# Patient Record
Sex: FEMALE | Race: WHITE | NOT HISPANIC OR LATINO | Employment: UNEMPLOYED | URBAN - METROPOLITAN AREA
[De-identification: names, ages, dates, MRNs, and addresses within clinical notes are randomized per-mention and may not be internally consistent; named-entity substitution may affect disease eponyms.]

---

## 2020-12-15 ENCOUNTER — NURSE TRIAGE (OUTPATIENT)
Dept: ADMINISTRATIVE | Facility: CLINIC | Age: 50
End: 2020-12-15

## 2020-12-15 NOTE — TELEPHONE ENCOUNTER
Pt calling for CV results. RN advised  The results were still in process but she should hear from the clinic she got the testing done at once the results were in. Advised pt she can always call back and check on the status. She VU.     Reason for Disposition   Caller requesting lab results    Protocols used:  PCP CALL - NO TRIAGE-A-AH

## 2021-04-14 ENCOUNTER — OFFICE VISIT (OUTPATIENT)
Dept: PODIATRY | Facility: CLINIC | Age: 51
End: 2021-04-14
Payer: MEDICAID

## 2021-04-14 VITALS
HEIGHT: 65 IN | SYSTOLIC BLOOD PRESSURE: 154 MMHG | WEIGHT: 213.81 LBS | BODY MASS INDEX: 35.62 KG/M2 | DIASTOLIC BLOOD PRESSURE: 99 MMHG

## 2021-04-14 DIAGNOSIS — G57.62 MORTON'S NEUROMA OF THIRD INTERSPACE OF LEFT FOOT: ICD-10-CM

## 2021-04-14 DIAGNOSIS — M72.2 PLANTAR FASCIITIS: Primary | ICD-10-CM

## 2021-04-14 PROCEDURE — 99213 OFFICE O/P EST LOW 20 MIN: CPT | Mod: PBBFAC,PN | Performed by: PODIATRIST

## 2021-04-14 PROCEDURE — 99203 OFFICE O/P NEW LOW 30 MIN: CPT | Mod: S$PBB,,, | Performed by: PODIATRIST

## 2021-04-14 PROCEDURE — 99999 PR PBB SHADOW E&M-EST. PATIENT-LVL III: ICD-10-PCS | Mod: PBBFAC,,, | Performed by: PODIATRIST

## 2021-04-14 PROCEDURE — 99203 PR OFFICE/OUTPT VISIT, NEW, LEVL III, 30-44 MIN: ICD-10-PCS | Mod: S$PBB,,, | Performed by: PODIATRIST

## 2021-04-14 PROCEDURE — 99999 PR PBB SHADOW E&M-EST. PATIENT-LVL III: CPT | Mod: PBBFAC,,, | Performed by: PODIATRIST

## 2021-04-14 RX ORDER — DICLOFENAC SODIUM 75 MG/1
75 TABLET, DELAYED RELEASE ORAL 2 TIMES DAILY
Qty: 60 TABLET | Refills: 1 | Status: SHIPPED | OUTPATIENT
Start: 2021-04-14 | End: 2021-08-12 | Stop reason: SDUPTHER

## 2021-04-21 ENCOUNTER — NURSE TRIAGE (OUTPATIENT)
Dept: ADMINISTRATIVE | Facility: CLINIC | Age: 51
End: 2021-04-21

## 2021-04-22 ENCOUNTER — TELEPHONE (OUTPATIENT)
Dept: PRIMARY CARE CLINIC | Facility: CLINIC | Age: 51
End: 2021-04-22

## 2021-05-05 ENCOUNTER — OFFICE VISIT (OUTPATIENT)
Dept: PODIATRY | Facility: CLINIC | Age: 51
End: 2021-05-05
Payer: MEDICAID

## 2021-05-05 VITALS
HEART RATE: 105 BPM | BODY MASS INDEX: 35.2 KG/M2 | WEIGHT: 219 LBS | DIASTOLIC BLOOD PRESSURE: 84 MMHG | HEIGHT: 66 IN | SYSTOLIC BLOOD PRESSURE: 128 MMHG

## 2021-05-05 DIAGNOSIS — R20.0 NUMBNESS: ICD-10-CM

## 2021-05-05 DIAGNOSIS — M72.2 PLANTAR FASCIITIS: ICD-10-CM

## 2021-05-05 DIAGNOSIS — G60.9 IDIOPATHIC PERIPHERAL NEUROPATHY: ICD-10-CM

## 2021-05-05 DIAGNOSIS — R26.89 BALANCE PROBLEM: Primary | ICD-10-CM

## 2021-05-05 PROCEDURE — 99999 PR PBB SHADOW E&M-EST. PATIENT-LVL III: CPT | Mod: PBBFAC,,, | Performed by: PODIATRIST

## 2021-05-05 PROCEDURE — 99213 OFFICE O/P EST LOW 20 MIN: CPT | Mod: S$PBB,,, | Performed by: PODIATRIST

## 2021-05-05 PROCEDURE — 99213 OFFICE O/P EST LOW 20 MIN: CPT | Mod: PBBFAC,PN | Performed by: PODIATRIST

## 2021-05-05 PROCEDURE — 99213 PR OFFICE/OUTPT VISIT, EST, LEVL III, 20-29 MIN: ICD-10-PCS | Mod: S$PBB,,, | Performed by: PODIATRIST

## 2021-05-05 PROCEDURE — 99999 PR PBB SHADOW E&M-EST. PATIENT-LVL III: ICD-10-PCS | Mod: PBBFAC,,, | Performed by: PODIATRIST

## 2021-05-05 RX ORDER — GABAPENTIN 400 MG/1
400 CAPSULE ORAL 4 TIMES DAILY
Qty: 120 CAPSULE | Refills: 1 | Status: SHIPPED | OUTPATIENT
Start: 2021-05-05 | End: 2021-05-11

## 2021-05-05 RX ORDER — GABAPENTIN 400 MG/1
400 CAPSULE ORAL 4 TIMES DAILY
Qty: 120 CAPSULE | Refills: 1 | Status: SHIPPED | OUTPATIENT
Start: 2021-05-05 | End: 2021-05-05 | Stop reason: SDUPTHER

## 2021-05-11 ENCOUNTER — OFFICE VISIT (OUTPATIENT)
Dept: PRIMARY CARE CLINIC | Facility: CLINIC | Age: 51
End: 2021-05-11
Payer: MEDICAID

## 2021-05-11 VITALS
BODY MASS INDEX: 35.2 KG/M2 | HEIGHT: 66 IN | RESPIRATION RATE: 18 BRPM | SYSTOLIC BLOOD PRESSURE: 132 MMHG | HEART RATE: 97 BPM | WEIGHT: 219 LBS | TEMPERATURE: 98 F | OXYGEN SATURATION: 99 % | DIASTOLIC BLOOD PRESSURE: 86 MMHG

## 2021-05-11 DIAGNOSIS — R11.2 NAUSEA AND VOMITING, INTRACTABILITY OF VOMITING NOT SPECIFIED, UNSPECIFIED VOMITING TYPE: ICD-10-CM

## 2021-05-11 DIAGNOSIS — Z11.59 NEED FOR HEPATITIS C SCREENING TEST: ICD-10-CM

## 2021-05-11 DIAGNOSIS — Z11.4 ENCOUNTER FOR SCREENING FOR HIV: ICD-10-CM

## 2021-05-11 DIAGNOSIS — G57.62 MORTON'S NEUROMA OF THIRD INTERSPACE OF LEFT FOOT: ICD-10-CM

## 2021-05-11 DIAGNOSIS — E66.9 CLASS 2 OBESITY WITH BODY MASS INDEX (BMI) OF 35.0 TO 35.9 IN ADULT, UNSPECIFIED OBESITY TYPE, UNSPECIFIED WHETHER SERIOUS COMORBIDITY PRESENT: ICD-10-CM

## 2021-05-11 DIAGNOSIS — Z01.419 ROUTINE GYNECOLOGICAL EXAMINATION: ICD-10-CM

## 2021-05-11 DIAGNOSIS — Z12.31 ENCOUNTER FOR SCREENING MAMMOGRAM FOR MALIGNANT NEOPLASM OF BREAST: Primary | ICD-10-CM

## 2021-05-11 DIAGNOSIS — Z12.11 ENCOUNTER FOR SCREENING FOR MALIGNANT NEOPLASM OF COLON: ICD-10-CM

## 2021-05-11 DIAGNOSIS — M72.2 PLANTAR FASCIITIS: ICD-10-CM

## 2021-05-11 DIAGNOSIS — Z72.0 TOBACCO ABUSE: ICD-10-CM

## 2021-05-11 DIAGNOSIS — G62.9 NEUROPATHY: ICD-10-CM

## 2021-05-11 PROBLEM — E66.812 CLASS 2 OBESITY WITH BODY MASS INDEX (BMI) OF 35.0 TO 35.9 IN ADULT: Status: ACTIVE | Noted: 2021-05-11

## 2021-05-11 LAB
BILIRUB SERPL-MCNC: NEGATIVE MG/DL
BLOOD URINE, POC: NORMAL
CLARITY, POC UA: NORMAL
COLOR, POC UA: YELLOW
GLUCOSE UR QL STRIP: NEGATIVE
KETONES UR QL STRIP: NEGATIVE
LEUKOCYTE ESTERASE URINE, POC: NORMAL
NITRITE, POC UA: NEGATIVE
PH, POC UA: 6
PROTEIN, POC: NORMAL
SPECIFIC GRAVITY, POC UA: 1.01
UROBILINOGEN, POC UA: 1

## 2021-05-11 PROCEDURE — 81002 URINALYSIS NONAUTO W/O SCOPE: CPT | Mod: PBBFAC,PN | Performed by: FAMILY MEDICINE

## 2021-05-11 PROCEDURE — 99214 OFFICE O/P EST MOD 30 MIN: CPT | Mod: PBBFAC,PN | Performed by: FAMILY MEDICINE

## 2021-05-11 PROCEDURE — 99999 PR PBB SHADOW E&M-EST. PATIENT-LVL IV: ICD-10-PCS | Mod: PBBFAC,,, | Performed by: FAMILY MEDICINE

## 2021-05-11 PROCEDURE — 99214 PR OFFICE/OUTPT VISIT, EST, LEVL IV, 30-39 MIN: ICD-10-PCS | Mod: S$PBB,,, | Performed by: FAMILY MEDICINE

## 2021-05-11 PROCEDURE — 99999 PR PBB SHADOW E&M-EST. PATIENT-LVL IV: CPT | Mod: PBBFAC,,, | Performed by: FAMILY MEDICINE

## 2021-05-11 PROCEDURE — 99214 OFFICE O/P EST MOD 30 MIN: CPT | Mod: S$PBB,,, | Performed by: FAMILY MEDICINE

## 2021-05-11 RX ORDER — CYCLOBENZAPRINE HCL 10 MG
10 TABLET ORAL 3 TIMES DAILY PRN
Qty: 30 TABLET | Refills: 5 | Status: SHIPPED | OUTPATIENT
Start: 2021-05-11 | End: 2021-08-19

## 2021-05-11 RX ORDER — TRAMADOL HYDROCHLORIDE 50 MG/1
50 TABLET ORAL EVERY 6 HOURS PRN
Qty: 30 TABLET | Refills: 0 | Status: SHIPPED | OUTPATIENT
Start: 2021-05-11 | End: 2021-05-21

## 2021-05-11 RX ORDER — GABAPENTIN 600 MG/1
600 TABLET ORAL 3 TIMES DAILY
Qty: 90 TABLET | Refills: 2 | Status: SHIPPED | OUTPATIENT
Start: 2021-05-11 | End: 2021-08-12 | Stop reason: SDUPTHER

## 2021-05-18 ENCOUNTER — TELEPHONE (OUTPATIENT)
Dept: PRIMARY CARE CLINIC | Facility: CLINIC | Age: 51
End: 2021-05-18

## 2021-06-15 RX ORDER — TRAMADOL HYDROCHLORIDE 50 MG/1
50 TABLET ORAL EVERY 6 HOURS PRN
Qty: 30 TABLET | Refills: 0 | OUTPATIENT
Start: 2021-06-15 | End: 2021-06-25

## 2021-06-17 ENCOUNTER — TELEPHONE (OUTPATIENT)
Dept: PRIMARY CARE CLINIC | Facility: CLINIC | Age: 51
End: 2021-06-17

## 2021-06-18 ENCOUNTER — OFFICE VISIT (OUTPATIENT)
Dept: PRIMARY CARE CLINIC | Facility: CLINIC | Age: 51
End: 2021-06-18
Payer: MEDICAID

## 2021-06-18 DIAGNOSIS — M72.2 PLANTAR FASCIITIS: ICD-10-CM

## 2021-06-18 DIAGNOSIS — G62.9 NEUROPATHY: ICD-10-CM

## 2021-06-18 DIAGNOSIS — G57.62 MORTON'S NEUROMA OF THIRD INTERSPACE OF LEFT FOOT: Primary | ICD-10-CM

## 2021-06-18 DIAGNOSIS — E66.9 CLASS 2 OBESITY WITH BODY MASS INDEX (BMI) OF 35.0 TO 35.9 IN ADULT, UNSPECIFIED OBESITY TYPE, UNSPECIFIED WHETHER SERIOUS COMORBIDITY PRESENT: ICD-10-CM

## 2021-06-18 DIAGNOSIS — Z72.0 TOBACCO ABUSE: ICD-10-CM

## 2021-06-18 PROBLEM — R11.2 NAUSEA WITH VOMITING: Status: RESOLVED | Noted: 2021-05-11 | Resolved: 2021-06-18

## 2021-06-18 PROCEDURE — 99213 OFFICE O/P EST LOW 20 MIN: CPT | Mod: 95,,, | Performed by: FAMILY MEDICINE

## 2021-06-18 PROCEDURE — 99213 PR OFFICE/OUTPT VISIT, EST, LEVL III, 20-29 MIN: ICD-10-PCS | Mod: 95,,, | Performed by: FAMILY MEDICINE

## 2021-06-18 RX ORDER — TRAMADOL HYDROCHLORIDE 50 MG/1
50 TABLET ORAL EVERY 6 HOURS PRN
Qty: 30 TABLET | Refills: 0 | Status: SHIPPED | OUTPATIENT
Start: 2021-06-18 | End: 2021-06-28

## 2021-08-05 ENCOUNTER — PATIENT OUTREACH (OUTPATIENT)
Dept: ADMINISTRATIVE | Facility: HOSPITAL | Age: 51
End: 2021-08-05

## 2021-08-12 ENCOUNTER — OFFICE VISIT (OUTPATIENT)
Dept: PRIMARY CARE CLINIC | Facility: CLINIC | Age: 51
End: 2021-08-12
Payer: MEDICAID

## 2021-08-12 ENCOUNTER — TELEPHONE (OUTPATIENT)
Dept: NEUROLOGY | Facility: CLINIC | Age: 51
End: 2021-08-12

## 2021-08-12 VITALS
RESPIRATION RATE: 18 BRPM | HEIGHT: 66 IN | DIASTOLIC BLOOD PRESSURE: 98 MMHG | WEIGHT: 219.25 LBS | OXYGEN SATURATION: 97 % | TEMPERATURE: 98 F | BODY MASS INDEX: 35.24 KG/M2 | HEART RATE: 116 BPM | SYSTOLIC BLOOD PRESSURE: 162 MMHG

## 2021-08-12 DIAGNOSIS — R70.0 ELEVATED SED RATE: ICD-10-CM

## 2021-08-12 DIAGNOSIS — R76.8 ELEVATED RHEUMATOID FACTOR: ICD-10-CM

## 2021-08-12 DIAGNOSIS — Z72.0 TOBACCO ABUSE: ICD-10-CM

## 2021-08-12 DIAGNOSIS — R71.8 ELEVATED MCV: ICD-10-CM

## 2021-08-12 DIAGNOSIS — E55.9 VITAMIN D DEFICIENCY: ICD-10-CM

## 2021-08-12 DIAGNOSIS — E66.9 CLASS 2 OBESITY WITH BODY MASS INDEX (BMI) OF 35.0 TO 35.9 IN ADULT, UNSPECIFIED OBESITY TYPE, UNSPECIFIED WHETHER SERIOUS COMORBIDITY PRESENT: ICD-10-CM

## 2021-08-12 DIAGNOSIS — R79.89 ELEVATED LIVER FUNCTION TESTS: ICD-10-CM

## 2021-08-12 DIAGNOSIS — M72.2 PLANTAR FASCIITIS: ICD-10-CM

## 2021-08-12 DIAGNOSIS — G62.9 NEUROPATHY: Primary | ICD-10-CM

## 2021-08-12 PROCEDURE — 99214 PR OFFICE/OUTPT VISIT, EST, LEVL IV, 30-39 MIN: ICD-10-PCS | Mod: S$PBB,,, | Performed by: FAMILY MEDICINE

## 2021-08-12 PROCEDURE — 99214 OFFICE O/P EST MOD 30 MIN: CPT | Mod: S$PBB,,, | Performed by: FAMILY MEDICINE

## 2021-08-12 PROCEDURE — 99999 PR PBB SHADOW E&M-EST. PATIENT-LVL III: CPT | Mod: PBBFAC,,, | Performed by: FAMILY MEDICINE

## 2021-08-12 PROCEDURE — 99999 PR PBB SHADOW E&M-EST. PATIENT-LVL III: ICD-10-PCS | Mod: PBBFAC,,, | Performed by: FAMILY MEDICINE

## 2021-08-12 PROCEDURE — 99213 OFFICE O/P EST LOW 20 MIN: CPT | Mod: PBBFAC,PN | Performed by: FAMILY MEDICINE

## 2021-08-12 RX ORDER — DICLOFENAC SODIUM 75 MG/1
75 TABLET, DELAYED RELEASE ORAL 2 TIMES DAILY
Qty: 60 TABLET | Refills: 1 | Status: SHIPPED | OUTPATIENT
Start: 2021-08-12 | End: 2021-09-28

## 2021-08-12 RX ORDER — GABAPENTIN 600 MG/1
600 TABLET ORAL 3 TIMES DAILY
Qty: 90 TABLET | Refills: 2 | Status: SHIPPED | OUTPATIENT
Start: 2021-08-12 | End: 2021-11-10

## 2021-08-12 RX ORDER — ERGOCALCIFEROL 1.25 MG/1
50000 CAPSULE ORAL
Qty: 12 CAPSULE | Refills: 3 | Status: SHIPPED | OUTPATIENT
Start: 2021-08-12 | End: 2022-01-24

## 2021-08-17 DIAGNOSIS — R79.89 ELEVATED LIVER FUNCTION TESTS: ICD-10-CM

## 2021-08-17 DIAGNOSIS — E53.8 FOLIC ACID DEFICIENCY: Primary | ICD-10-CM

## 2021-08-17 PROBLEM — E87.6 HYPOKALEMIA: Status: ACTIVE | Noted: 2021-08-17

## 2021-08-17 RX ORDER — POTASSIUM CHLORIDE 750 MG/1
10 CAPSULE, EXTENDED RELEASE ORAL ONCE
Qty: 30 CAPSULE | Refills: 5 | Status: SHIPPED | OUTPATIENT
Start: 2021-08-17 | End: 2021-08-17

## 2021-08-19 ENCOUNTER — DOCUMENTATION ONLY (OUTPATIENT)
Dept: TRANSPLANT | Facility: CLINIC | Age: 51
End: 2021-08-19

## 2021-08-20 ENCOUNTER — TELEPHONE (OUTPATIENT)
Dept: HEPATOLOGY | Facility: CLINIC | Age: 51
End: 2021-08-20

## 2021-09-30 ENCOUNTER — PATIENT OUTREACH (OUTPATIENT)
Dept: ADMINISTRATIVE | Facility: OTHER | Age: 51
End: 2021-09-30

## 2021-11-17 RX ORDER — CYCLOBENZAPRINE HCL 10 MG
TABLET ORAL
Qty: 30 TABLET | Refills: 5 | Status: SHIPPED | OUTPATIENT
Start: 2021-11-17 | End: 2022-01-24

## 2021-12-16 ENCOUNTER — PROCEDURE VISIT (OUTPATIENT)
Dept: NEUROLOGY | Facility: CLINIC | Age: 51
End: 2021-12-16
Payer: MEDICAID

## 2021-12-16 DIAGNOSIS — G62.9 NEUROPATHY: ICD-10-CM

## 2021-12-16 PROCEDURE — 95886 PR EMG COMPLETE, W/ NERVE CONDUCTION STUDIES, 5+ MUSCLES: ICD-10-PCS | Mod: 26,S$PBB,, | Performed by: PSYCHIATRY & NEUROLOGY

## 2021-12-16 PROCEDURE — 95911 PR NERVE CONDUCTION STUDY; 9-10 STUDIES: ICD-10-PCS | Mod: 26,S$PBB,, | Performed by: PSYCHIATRY & NEUROLOGY

## 2021-12-16 PROCEDURE — 95886 MUSC TEST DONE W/N TEST COMP: CPT | Mod: 26,S$PBB,, | Performed by: PSYCHIATRY & NEUROLOGY

## 2021-12-16 PROCEDURE — 95886 MUSC TEST DONE W/N TEST COMP: CPT | Mod: PBBFAC | Performed by: PSYCHIATRY & NEUROLOGY

## 2021-12-16 PROCEDURE — 95911 NRV CNDJ TEST 9-10 STUDIES: CPT | Mod: PBBFAC | Performed by: PSYCHIATRY & NEUROLOGY

## 2021-12-16 PROCEDURE — 95911 NRV CNDJ TEST 9-10 STUDIES: CPT | Mod: 26,S$PBB,, | Performed by: PSYCHIATRY & NEUROLOGY

## 2022-01-19 ENCOUNTER — TELEPHONE (OUTPATIENT)
Dept: PRIMARY CARE CLINIC | Facility: CLINIC | Age: 52
End: 2022-01-19
Payer: MEDICAID

## 2022-01-19 NOTE — TELEPHONE ENCOUNTER
----- Message from Ronaldo Capone sent at 1/19/2022  3:32 PM CST -----  Contact: [phone] pt  Pt states she needs a RTW notice for her job. Pt states she can pick it up on tomorrow. Pt also states she has a medical record that is not sure how to read. Please call and advise  Thank you

## 2022-01-21 ENCOUNTER — PATIENT MESSAGE (OUTPATIENT)
Dept: ADMINISTRATIVE | Facility: HOSPITAL | Age: 52
End: 2022-01-21
Payer: MEDICAID

## 2022-01-24 ENCOUNTER — OFFICE VISIT (OUTPATIENT)
Dept: PRIMARY CARE CLINIC | Facility: CLINIC | Age: 52
End: 2022-01-24
Payer: MEDICAID

## 2022-01-24 VITALS
HEART RATE: 102 BPM | WEIGHT: 209.69 LBS | DIASTOLIC BLOOD PRESSURE: 88 MMHG | BODY MASS INDEX: 33.7 KG/M2 | SYSTOLIC BLOOD PRESSURE: 134 MMHG | RESPIRATION RATE: 18 BRPM | HEIGHT: 66 IN | OXYGEN SATURATION: 92 %

## 2022-01-24 DIAGNOSIS — E53.8 FOLIC ACID DEFICIENCY: ICD-10-CM

## 2022-01-24 DIAGNOSIS — E55.9 VITAMIN D DEFICIENCY: ICD-10-CM

## 2022-01-24 DIAGNOSIS — H60.552 ACUTE REACTIVE OTITIS EXTERNA OF LEFT EAR: ICD-10-CM

## 2022-01-24 DIAGNOSIS — J98.01 BRONCHOSPASM: ICD-10-CM

## 2022-01-24 DIAGNOSIS — J01.81 OTHER ACUTE RECURRENT SINUSITIS: ICD-10-CM

## 2022-01-24 DIAGNOSIS — R76.8 ELEVATED RHEUMATOID FACTOR: ICD-10-CM

## 2022-01-24 DIAGNOSIS — J40 BRONCHITIS: ICD-10-CM

## 2022-01-24 DIAGNOSIS — Z72.0 TOBACCO ABUSE: Primary | ICD-10-CM

## 2022-01-24 DIAGNOSIS — M72.2 PLANTAR FASCIITIS: ICD-10-CM

## 2022-01-24 PROCEDURE — 99999 PR PBB SHADOW E&M-EST. PATIENT-LVL IV: ICD-10-PCS | Mod: PBBFAC,,, | Performed by: FAMILY MEDICINE

## 2022-01-24 PROCEDURE — 99999 PR PBB SHADOW E&M-EST. PATIENT-LVL IV: CPT | Mod: PBBFAC,,, | Performed by: FAMILY MEDICINE

## 2022-01-24 PROCEDURE — 1159F PR MEDICATION LIST DOCUMENTED IN MEDICAL RECORD: ICD-10-PCS | Mod: CPTII,,, | Performed by: FAMILY MEDICINE

## 2022-01-24 PROCEDURE — 99214 OFFICE O/P EST MOD 30 MIN: CPT | Mod: S$PBB,,, | Performed by: FAMILY MEDICINE

## 2022-01-24 PROCEDURE — 3008F PR BODY MASS INDEX (BMI) DOCUMENTED: ICD-10-PCS | Mod: CPTII,,, | Performed by: FAMILY MEDICINE

## 2022-01-24 PROCEDURE — 96372 THER/PROPH/DIAG INJ SC/IM: CPT | Mod: PBBFAC,PN

## 2022-01-24 PROCEDURE — 99214 PR OFFICE/OUTPT VISIT, EST, LEVL IV, 30-39 MIN: ICD-10-PCS | Mod: S$PBB,,, | Performed by: FAMILY MEDICINE

## 2022-01-24 PROCEDURE — 3008F BODY MASS INDEX DOCD: CPT | Mod: CPTII,,, | Performed by: FAMILY MEDICINE

## 2022-01-24 PROCEDURE — 99214 OFFICE O/P EST MOD 30 MIN: CPT | Mod: 25,PBBFAC,PN | Performed by: FAMILY MEDICINE

## 2022-01-24 PROCEDURE — 3075F PR MOST RECENT SYSTOLIC BLOOD PRESS GE 130-139MM HG: ICD-10-PCS | Mod: CPTII,,, | Performed by: FAMILY MEDICINE

## 2022-01-24 PROCEDURE — 3079F PR MOST RECENT DIASTOLIC BLOOD PRESSURE 80-89 MM HG: ICD-10-PCS | Mod: CPTII,,, | Performed by: FAMILY MEDICINE

## 2022-01-24 PROCEDURE — 3079F DIAST BP 80-89 MM HG: CPT | Mod: CPTII,,, | Performed by: FAMILY MEDICINE

## 2022-01-24 PROCEDURE — 3075F SYST BP GE 130 - 139MM HG: CPT | Mod: CPTII,,, | Performed by: FAMILY MEDICINE

## 2022-01-24 PROCEDURE — 1159F MED LIST DOCD IN RCRD: CPT | Mod: CPTII,,, | Performed by: FAMILY MEDICINE

## 2022-01-24 RX ORDER — TRIAMCINOLONE ACETONIDE 40 MG/ML
40 INJECTION, SUSPENSION INTRA-ARTICULAR; INTRAMUSCULAR ONCE
Status: COMPLETED | OUTPATIENT
Start: 2022-01-24 | End: 2022-01-24

## 2022-01-24 RX ORDER — ALBUTEROL SULFATE 90 UG/1
2 AEROSOL, METERED RESPIRATORY (INHALATION) EVERY 4 HOURS PRN
Qty: 18 G | Refills: 5 | Status: SHIPPED | OUTPATIENT
Start: 2022-01-24 | End: 2022-04-28

## 2022-01-24 RX ORDER — AZITHROMYCIN 250 MG/1
TABLET, FILM COATED ORAL
Qty: 6 TABLET | Refills: 0 | Status: SHIPPED | OUTPATIENT
Start: 2022-01-24 | End: 2022-01-28

## 2022-01-24 RX ORDER — PROMETHAZINE HYDROCHLORIDE AND CODEINE PHOSPHATE 6.25; 1 MG/5ML; MG/5ML
5 SOLUTION ORAL EVERY 6 HOURS PRN
Qty: 180 ML | Refills: 0 | Status: SHIPPED | OUTPATIENT
Start: 2022-01-24 | End: 2022-02-05

## 2022-01-24 RX ORDER — POTASSIUM CHLORIDE 750 MG/1
CAPSULE, EXTENDED RELEASE ORAL
COMMUNITY
Start: 2022-01-11 | End: 2022-05-09 | Stop reason: SDUPTHER

## 2022-01-24 RX ORDER — PREDNISONE 5 MG/1
TABLET ORAL
Qty: 20 TABLET | Refills: 0 | Status: SHIPPED | OUTPATIENT
Start: 2022-01-24 | End: 2022-03-21

## 2022-01-24 RX ADMIN — TRIAMCINOLONE ACETONIDE 40 MG: 40 INJECTION, SUSPENSION INTRA-ARTICULAR; INTRAMUSCULAR at 02:01

## 2022-01-24 NOTE — PROGRESS NOTES
Subjective:       Patient ID: Camila Adan is a 51 y.o. female.    Chief Complaint: Follow-up    HPI: 52 yo WF -- 01/12/2022--patient was unable to eat anything solid---until yesterday---+fever when went to the emergency room--diagnosis with viral syndrome with cough---+runny stuffy nose--bad taste in her mouth--+sore throat still has sore throat--+cough but not as severe--+phlegm--clear.  No pneumonia asthma TB--+smoking--half pack per day--nausea vomiting diarrhea but stop 4 days ago, COVID test was positive on 01/12/2022---no covid vaccine--patient may have been exposed to COVID at work is COVID.  Treated with nausea--ibuprofen because sides were hurting from coughing--cough medication. Not working since 1---needs note okay to go back to work and note that she had COVID.      Had EMG study of the lower extremity       ROS:    Skin: no psoriasis, eczema, skin cancer  HEENT: No headache, ocular pain, blurred vision, diplopia, epistaxis, hoarseness+change in voice  When sick -- , thyroid trouble  Lung: No pneumonia, asthma, Tb, wheezing, SOB, smoking half pack per day  Heart: No chest pain, no ankle edema, no  palpitations, MI, shane murmur, hypertension, hyperlipidemia  Abdomen: + nausea,+   vomiting  no  diarrhea, constipation, ulcers, hepatitis, gallbladder disease, melena, hematochezia, hematemesis  : no UTI, renal disease, stones  GYN LMP started 01/12/2022  MS: no fractures, O/A, lupus, rheumatoid, gout  Neuro: No dizziness, LOC, seizures   No diabetes, no anemia, no anxiety, n wants to get checked for diabetes o depression  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      Objective:   Physical Exam:    General: Well nourished, well developed, no acute distress +obesity  Skin: No lesions  HEENT: Eyes PERRLA, EOM intact, nose patent, throat non-erythematous ears  left TM slightly injected--yellow liquid in ear canal   NECK: Supple, no bruits, No JVD, no nodes  Lungs:  Clear, no rales, +rhonchi,+ wheezing  Heart: Regular rate and rhythm, no murmurs, gallops, or rubs  Abdomen: flat, bowel sounds positive, no tenderness, or organomegaly  MS: Range of motion and muscle strength intact pulses 2/4--history pens or screws in the right 4th 5th toes pulses appear normal  Neuro: Alert, CN intact, oriented X 3  Extremities: No cyanosis, clubbing, or edema         Assessment:       1. Tobacco abuse    2. Other acute recurrent sinusitis    3. Bronchospasm    4. Bronchitis    5. Acute reactive otitis externa of left ear    6. Plantar fasciitis    7. Vitamin D deficiency    8. Folic acid deficiency    9. Elevated rheumatoid factor        Plan:       Tobacco abuse    Other acute recurrent sinusitis    Bronchospasm  -     X-Ray Chest PA And Lateral; Future; Expected date: 01/24/2022  -     Comprehensive Metabolic Panel; Future; Expected date: 04/24/2022  -     T4, Free; Future; Expected date: 04/24/2022  -     CBC Auto Differential; Future; Expected date: 04/24/2022  -     TSH; Future; Expected date: 04/24/2022  -     T4, Free; Future; Expected date: 04/24/2022    Bronchitis    Acute reactive otitis externa of left ear    Plantar fasciitis    Vitamin D deficiency    Folic acid deficiency    Elevated rheumatoid factor  -     Ambulatory referral/consult to Rheumatology; Future; Expected date: 01/31/2022    Other orders  -     triamcinolone acetonide injection 40 mg  -     azithromycin (Z-RUDY) 250 MG tablet; 2 tabs by mouth day 1, then 1 tab by mouth daily x 4 days  Dispense: 6 tablet; Refill: 0  -     predniSONE (DELTASONE) 5 MG tablet; 4 po qd x 2, 3 po qd x2, 2 po qd x2, 1 po qd x2  Dispense: 20 tablet; Refill: 0  -     albuterol (PROVENTIL/VENTOLIN HFA) 90 mcg/actuation inhaler; Inhale 2 puffs into the lungs every 4 (four) hours as needed. Rescue  Dispense: 18 g; Refill: 5  -     promethazine-codeine 6.25-10 mg/5 ml (PHENERGAN WITH CODEINE) 6.25-10 mg/5 mL syrup; Take 5 mLs by mouth every 6  (six) hours as needed for Cough.  Dispense: 180 mL; Refill: 0  -     neomycin-colist-HC-thonzonium (COLY-MYCIN S) 3.3-3-10-0.5 mg/mL DrpS; Call my cellphone [phone] if not covered Could consider ciprodex Otic  Dispense: 10 mL; Refill: 0        Main Reason for Visit-  COVID infection 01/12/2022---nausea vomiting diarrhea until 4 days ago--still with upper respiratory symptoms of bronchospasm--Kenalog/prednisone taper/Phenergan codeine/Zithromax/chest x-ray-if positive for pneumonia will switch to Levaquin  Tobacco abuse Needs to DC smoking  Left earache with external otitis left ear canal Cortisporin drops 4 drops affected ear q.6 hours x7 days  -numbness in the feet numbness --saw Dr Reyes podiatrist ---needs EMG report--shows neuropathy but appears be metabolic or toxic Pt to review with rheumatologist   Positive rheumatoid factor with elevated sed rate see rheumatologist  Vitamin D 4 needs to be on vitamin-D 47390 units  Ankle edema 20 mg of Lasix 1 p.o. q.a.m. p.r.n. edema  Obesity needs to exercise try to get ideal body weight   Other medical issues  Lab redo  CBC,CMP,T4 TSH   Health maintenance hepatitis C tetanus mammogram colorectal screen COVID   Can do Pap smear in 3 months if desires                                               Subjective:       Patient ID: Camila Adan is a 51 y.o. female.    Chief Complaint: Follow-up    HPI: 51 yo WF --patient for checkup--feet pain--burning pain went away but stabbing pain like needles persisted.  Comes and goes.  Yesterday was at work--had episode --like needle last few seconds but hurts really bad. Occas walking gets spasm leg that kicks sideways . Pt saw Dr Reyes--gave pt something put between toes.  May 2021 had femoral artery Dopplers that were normal.     Ankles swollen everyday--season dentures of socks on ankles daily.     Patient was not called about labs done in May hematocrit 36.1 mild anemia  AS T 69 ALT 61 vitamin-D 4 sed rate 40 rheumatoid  factor 28       ROS:    Skin: no psoriasis, eczema, skin cancer  HEENT: No headache, ocular pain, blurred vision, diplopia, epistaxis, hoarseness change in voice, thyroid trouble  Lung: No pneumonia, asthma, Tb, wheezing, SOB, smoking half pack per day  Heart: No chest pain, +ankle edema, no  palpitations, MI, shane murmur, hypertension, hyperlipidemia  Abdomen: No nausea,no vomiting  no  diarrhea, constipation, ulcers, hepatitis, gallbladder disease, melena, hematochezia, hematemesis  : no UTI, renal disease, stones  GYN LMP 3 mo ago for 3 days does get hot and cold thinks going through menopause  MS: no fractures, O/A, lupus, rheumatoid, gout  Neuro: No dizziness, LOC, seizures   No diabetes, no anemia, no anxiety, no depression  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      Objective:   Physical Exam:    General: Well nourished, well developed, no acute distress +obesity  Skin: No lesions  HEENT: Eyes PERRLA, EOM intact, nose patent, throat non-erythematous ears  left TM slightly injected  NECK: Supple, no bruits, No JVD, no nodes  Lungs: Clear, no rales, rhonchi, wheezing  Heart: Regular rate and rhythm, no murmurs, gallops, or rubs  Abdomen: flat, bowel sounds positive, no tenderness, or organomegaly  MS: Range of motion and muscle strength intact pulses 2/4--history pens or screws in the right 4th 5th toes pulses appear normal  Neuro: Alert, CN intact, oriented X 3  Extremities: No cyanosis, clubbing, or edema         Assessment:       1. Tobacco abuse    2. Other acute recurrent sinusitis    3. Bronchospasm    4. Bronchitis    5. Acute reactive otitis externa of left ear    6. Plantar fasciitis    7. Vitamin D deficiency    8. Folic acid deficiency    9. Elevated rheumatoid factor        Plan:       Tobacco abuse    Other acute recurrent sinusitis    Bronchospasm  -     X-Ray Chest PA And Lateral; Future; Expected date: 01/24/2022  -     Comprehensive Metabolic Panel; Future;  Expected date: 04/24/2022  -     T4, Free; Future; Expected date: 04/24/2022  -     CBC Auto Differential; Future; Expected date: 04/24/2022  -     TSH; Future; Expected date: 04/24/2022  -     T4, Free; Future; Expected date: 04/24/2022    Bronchitis    Acute reactive otitis externa of left ear    Plantar fasciitis    Vitamin D deficiency    Folic acid deficiency    Elevated rheumatoid factor  -     Ambulatory referral/consult to Rheumatology; Future; Expected date: 01/31/2022    Other orders  -     triamcinolone acetonide injection 40 mg  -     azithromycin (Z-RUDY) 250 MG tablet; 2 tabs by mouth day 1, then 1 tab by mouth daily x 4 days  Dispense: 6 tablet; Refill: 0  -     predniSONE (DELTASONE) 5 MG tablet; 4 po qd x 2, 3 po qd x2, 2 po qd x2, 1 po qd x2  Dispense: 20 tablet; Refill: 0  -     albuterol (PROVENTIL/VENTOLIN HFA) 90 mcg/actuation inhaler; Inhale 2 puffs into the lungs every 4 (four) hours as needed. Rescue  Dispense: 18 g; Refill: 5  -     promethazine-codeine 6.25-10 mg/5 ml (PHENERGAN WITH CODEINE) 6.25-10 mg/5 mL syrup; Take 5 mLs by mouth every 6 (six) hours as needed for Cough.  Dispense: 180 mL; Refill: 0  -     neomycin-colist-HC-thonzonium (COLY-MYCIN S) 3.3-3-10-0.5 mg/mL DrLele; Call my cellphone [phone] if not covered Could consider ciprodex Otic  Dispense: 10 mL; Refill: 0        Main Reason for Visit-  -numbness in the feet numbness --saw Dr Reyes podiatrist no relief--some relief with gabapentin---May 2021 femoral artery Dopplers showed no stenosis--patient convinced has neurological disorder will get EMG of the lower legs may increase dose of gabapentin continue NSAID and Ultram  Lab anemia hematocrit 36.1 mild will not workup at this time  Elevated MCV will get B12 folic acid levels  Elevated liver function test AST 69 ALT 61 will get acute hepatitis panel and ultrasound of the abdomen to evaluate liver spleen and gallbladder  Positive rheumatoid factor with elevated sed rate  see rheumatologist  Vitamin D 4 needs to be on vitamin-D 46369 units  Ankle edema 20 mg of Lasix 1 p.o. q.a.m. p.r.n. edema  Obesity needs to exercise try to get ideal body weight   Other medical issues  Tobacco abuse half pack per day patient Needs to DC smoking told can cause vascular constriction time 45 min with smoking each cigarette  Lab redo 6 mo CBC,CMP,Vit D  Health maintenance coccal vaccine COVID vaccine mammogram Pap smear shingles colorectal screen   Can do Pap smear in 3 months if desires  Lab now B12/folic acid/acute hepatitis panel/abdominal ultrasound--referral to rheumatologist--start vitamin D

## 2022-01-25 ENCOUNTER — TELEPHONE (OUTPATIENT)
Dept: PRIMARY CARE CLINIC | Facility: CLINIC | Age: 52
End: 2022-01-25
Payer: MEDICAID

## 2022-01-25 NOTE — TELEPHONE ENCOUNTER
----- Message from Renetta Washington sent at 1/25/2022  8:42 AM CST -----  Contact: [phone]  Pt needs her referral for rheumatology faxed to physician at [facility] !      Fax -[phone]

## 2022-01-31 NOTE — TELEPHONE ENCOUNTER
No new care gaps identified.  Powered by [employer]. Reference number: [id number].   1/31/2022 8:00:57 AM CST

## 2022-02-02 RX ORDER — POTASSIUM CHLORIDE 750 MG/1
CAPSULE, EXTENDED RELEASE ORAL
Qty: 90 CAPSULE | Refills: 1 | OUTPATIENT
Start: 2022-02-02

## 2022-02-02 NOTE — TELEPHONE ENCOUNTER
Refill Routing Note   Medication(s) are not appropriate for processing by [facility] for the following reason(s):      - Outside of protocol    OR action(s):  Route  Quick Discontinue       Medication Therapy Plan: Gabapentin is out of OR protocol; route  --->Care Gap information included in message below if applicable.   Medication reconciliation completed: No   Automatic Epic Generated Protocol Data:        Requested Prescriptions   Pending Prescriptions Disp Refills    gabapentin (NEURONTIN) 600 MG tablet [Pharmacy Med Name: gabapentin 600 mg tablet] 90 tablet 2     Sig: TAKE ONE TABLET BY MOUTH THREE TIMES DAILY       Neurology: Anticonvulsants - gabapentin Failed - 1/31/2022  8:00 AM        Failed - This refill cannot be delegated        Passed - Valid encounter within last 12 months     Recent Visits  Date Type Provider Dept   01/24/22 Office Visit Orestes Hancock MD [facility]   08/12/21 Office Visit Orestes Hancock MD [facility]   06/18/21 Office Visit Orestes Hancock MD [facility]   05/11/21 Office Visit Orestes Hancock MD [facility]   Showing recent visits within past 720 days and meeting all other requirements  Future Appointments  No visits were found meeting these conditions.  Showing future appointments within next 150 days and meeting all other requirements      Future Appointments              In 2 months [facility] Hancock [facility] [address]                Passed - Cr within 360 days     Lab Results   Component Value Date    CREATININE 0.7 08/12/2021    CREATININE 0.8 05/11/2021    CREATININE 0.7 04/22/2021              Passed - eGFR within 360 days     Lab Results   Component Value Date    EGFRNONAA >60.0 08/12/2021    EGFRNONAA >60.0 05/11/2021    EGFRNONAA >60.0 04/22/2021                 Refused Prescriptions Disp Refills    potassium chloride (MICRO-K) 10 MEQ CpSR [Pharmacy  Med Name: potassium chloride ER 10 mEq capsule,extended release] 90 capsule 1     Sig: TAKE ONE CAPSULE BY MOUTH ONCE DAILY FOR ONE DOSE       Endocrinology:  Minerals - Potassium Supplementation Passed - 1/31/2022  8:00 AM        Passed - Patient is at least 18 years old        Passed - Valid encounter within last 15 months     Recent Visits  Date Type Provider Dept   01/24/22 Office Visit Orestes Hancock MD [facility]   08/12/21 Office Visit Orestes Hancock MD [facility]   06/18/21 Office Visit Orestes Hancock MD [facility]   05/11/21 Office Visit Orestes Hancock MD [facility]   Showing recent visits within past 720 days and meeting all other requirements  Future Appointments  No visits were found meeting these conditions.  Showing future appointments within next 150 days and meeting all other requirements      Future Appointments              In 2 months Orestes Hancock MD [facility] [address]                Passed - K is 5.2 or below and within 360 days     Potassium   Date Value Ref Range Status   08/12/2021 3.3 (L) 3.5 - 5.1 mmol/L Final   05/11/2021 3.6 3.5 - 5.1 mmol/L Final   04/22/2021 3.3 (L) 3.5 - 5.1 mmol/L Final              Passed - Cr is 1.39 or below and within 360 days     Lab Results   Component Value Date    CREATININE 0.7 08/12/2021    CREATININE 0.8 05/11/2021    CREATININE 0.7 04/22/2021              Passed - eGFR within 360 days     Lab Results   Component Value Date    EGFRNONAA >60.0 08/12/2021    EGFRNONAA >60.0 05/11/2021    EGFRNONAA >60.0 04/22/2021                      Appointments  past 12m or future 3m with PCP    Date Provider   Last Visit   1/24/2022 Orestes Hancock MD   Next Visit   4/25/2022 Orestes Hancock MD   ED visits in past 90 days: 1        Note composed:2:18 PM 02/02/2022        Pended Medication(s)   Requested Prescriptions     Pending Prescriptions Disp Refills     gabapentin (NEURONTIN) 600 MG tablet [Pharmacy Med Name: gabapentin 600 mg tablet] 90 tablet 2     Sig: TAKE ONE TABLET BY MOUTH THREE TIMES DAILY     Refused Prescriptions Disp Refills    potassium chloride (MICRO-K) 10 MEQ CpSR [Pharmacy Med Name: potassium chloride ER 10 mEq capsule,extended release] 90 capsule 1     Sig: TAKE ONE CAPSULE BY MOUTH ONCE DAILY FOR ONE DOSE     Refused By: JOSEPH HARTMAN     Reason for Refusal: Refill not appropriate        Duplicate Pended Encounter(s)/ Last Prescribed Details:    Ordering Encounter Report    Associated Reports   View Encounter               Note composed:2:19 PM 02/02/2022

## 2022-02-05 RX ORDER — GABAPENTIN 600 MG/1
TABLET ORAL
Qty: 90 TABLET | Refills: 2 | Status: SHIPPED | OUTPATIENT
Start: 2022-02-05 | End: 2022-05-06

## 2022-02-07 RX ORDER — CYCLOBENZAPRINE HCL 10 MG
TABLET ORAL
Qty: 30 TABLET | Refills: 5 | Status: SHIPPED | OUTPATIENT
Start: 2022-02-07 | End: 2022-03-21

## 2022-02-07 NOTE — TELEPHONE ENCOUNTER
No new care gaps identified.  Powered by [employer]. Reference number: [id number].   2/07/2022 9:58:59 AM CST

## 2022-03-16 ENCOUNTER — TELEPHONE (OUTPATIENT)
Dept: PRIMARY CARE CLINIC | Facility: CLINIC | Age: 52
End: 2022-03-16
Payer: MEDICAID

## 2022-03-16 NOTE — TELEPHONE ENCOUNTER
Called and spoke with patient and an appointment was scheduled for Monday to discuss with Dr. Hancock

## 2022-03-16 NOTE — TELEPHONE ENCOUNTER
----- Message from Renee Hall sent at 3/16/2022 11:16 AM CDT -----  Contact: Self   Pt is requesting orders for MRI of her legs. Pt states she is having issues with the pain again. Please advise

## 2022-03-21 ENCOUNTER — OFFICE VISIT (OUTPATIENT)
Dept: PRIMARY CARE CLINIC | Facility: CLINIC | Age: 52
End: 2022-03-21
Payer: MEDICAID

## 2022-03-21 VITALS
SYSTOLIC BLOOD PRESSURE: 138 MMHG | DIASTOLIC BLOOD PRESSURE: 88 MMHG | WEIGHT: 209.69 LBS | BODY MASS INDEX: 33.7 KG/M2 | HEIGHT: 66 IN | HEART RATE: 118 BPM | OXYGEN SATURATION: 95 % | RESPIRATION RATE: 18 BRPM

## 2022-03-21 DIAGNOSIS — J40 BRONCHITIS: ICD-10-CM

## 2022-03-21 DIAGNOSIS — G62.9 POLYNEUROPATHY: ICD-10-CM

## 2022-03-21 DIAGNOSIS — E53.8 FOLIC ACID DEFICIENCY: ICD-10-CM

## 2022-03-21 DIAGNOSIS — E55.9 VITAMIN D DEFICIENCY: ICD-10-CM

## 2022-03-21 DIAGNOSIS — Z12.31 ENCOUNTER FOR SCREENING MAMMOGRAM FOR MALIGNANT NEOPLASM OF BREAST: Primary | ICD-10-CM
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
## 2022-03-21 DIAGNOSIS — R76.8 ELEVATED RHEUMATOID FACTOR: ICD-10-CM

## 2022-03-21 DIAGNOSIS — Z01.419 ROUTINE GYNECOLOGICAL EXAMINATION: ICD-10-CM

## 2022-03-21 DIAGNOSIS — Z72.0 TOBACCO ABUSE: ICD-10-CM

## 2022-03-21 DIAGNOSIS — R79.89 ELEVATED LIVER FUNCTION TESTS: ICD-10-CM

## 2022-03-21 PROBLEM — R29.6 FALLS: Status: ACTIVE | Noted: 2022-03-21

## 2022-03-21 PROCEDURE — 99214 OFFICE O/P EST MOD 30 MIN: CPT | Mod: S$PBB,,, | Performed by: FAMILY MEDICINE

## 2022-03-21 PROCEDURE — 1159F PR MEDICATION LIST DOCUMENTED IN MEDICAL RECORD: ICD-10-PCS | Mod: CPTII,,, | Performed by: FAMILY MEDICINE

## 2022-03-21 PROCEDURE — 3079F PR MOST RECENT DIASTOLIC BLOOD PRESSURE 80-89 MM HG: ICD-10-PCS | Mod: CPTII,,, | Performed by: FAMILY MEDICINE

## 2022-03-21 PROCEDURE — 3008F PR BODY MASS INDEX (BMI) DOCUMENTED: ICD-10-PCS | Mod: CPTII,,, | Performed by: FAMILY MEDICINE

## 2022-03-21 PROCEDURE — 3079F DIAST BP 80-89 MM HG: CPT | Mod: CPTII,,, | Performed by: FAMILY MEDICINE

## 2022-03-21 PROCEDURE — 3075F SYST BP GE 130 - 139MM HG: CPT | Mod: CPTII,,, | Performed by: FAMILY MEDICINE

## 2022-03-21 PROCEDURE — 3075F PR MOST RECENT SYSTOLIC BLOOD PRESS GE 130-139MM HG: ICD-10-PCS | Mod: CPTII,,, | Performed by: FAMILY MEDICINE

## 2022-03-21 PROCEDURE — 99215 OFFICE O/P EST HI 40 MIN: CPT | Mod: PBBFAC,PN | Performed by: FAMILY MEDICINE

## 2022-03-21 PROCEDURE — 99999 PR PBB SHADOW E&M-EST. PATIENT-LVL V: CPT | Mod: PBBFAC,,, | Performed by: FAMILY MEDICINE

## 2022-03-21 PROCEDURE — 1159F MED LIST DOCD IN RCRD: CPT | Mod: CPTII,,, | Performed by: FAMILY MEDICINE

## 2022-03-21 PROCEDURE — 99214 PR OFFICE/OUTPT VISIT, EST, LEVL IV, 30-39 MIN: ICD-10-PCS | Mod: S$PBB,,, | Performed by: FAMILY MEDICINE

## 2022-03-21 PROCEDURE — 99999 PR PBB SHADOW E&M-EST. PATIENT-LVL V: ICD-10-PCS | Mod: PBBFAC,,, | Performed by: FAMILY MEDICINE

## 2022-03-21 PROCEDURE — 3008F BODY MASS INDEX DOCD: CPT | Mod: CPTII,,, | Performed by: FAMILY MEDICINE

## 2022-03-21 RX ORDER — ALENDRONATE SODIUM 70 MG/1
70 TABLET ORAL
Qty: 4 TABLET | Refills: 11 | Status: SHIPPED | OUTPATIENT
Start: 2022-03-21 | End: 2023-02-20

## 2022-03-21 RX ORDER — HYDROCODONE BITARTRATE AND ACETAMINOPHEN 5; 325 MG/1; MG/1
1 TABLET ORAL EVERY 6 HOURS PRN
Qty: 30 TABLET | Refills: 0 | Status: SHIPPED | OUTPATIENT
Start: 2022-03-21 | End: 2022-04-04

## 2022-03-21 RX ORDER — CYANOCOBALAMIN 1000 UG/ML
INJECTION, SOLUTION INTRAMUSCULAR; SUBCUTANEOUS
Qty: 10 ML | Refills: 5 | Status: SHIPPED | OUTPATIENT
Start: 2022-03-21 | End: 2023-03-01

## 2022-03-21 RX ORDER — ERGOCALCIFEROL 1.25 MG/1
50000 CAPSULE ORAL
COMMUNITY
Start: 2022-02-22 | End: 2022-06-20

## 2022-03-21 RX ORDER — MELOXICAM 7.5 MG/1
7.5 TABLET ORAL 2 TIMES DAILY PRN
Qty: 60 TABLET | Refills: 5 | Status: SHIPPED | OUTPATIENT
Start: 2022-03-21 | End: 2022-08-31

## 2022-03-21 NOTE — PROGRESS NOTES
Subjective:       Patient ID: Camila Adan is a 51 y.o. female.    Chief Complaint: No chief complaint on file.    HPI: 50 yo WF -- 01/12/2022--patient was unable to eat anything solid---until yesterday---+fever when went to the emergency room--diagnosis with viral syndrome with cough---+runny stuffy nose--bad taste in her mouth--+sore throat still has sore throat--+cough but not as severe--+phlegm--clear.  No pneumonia asthma TB--+smoking--half pack per day--nausea vomiting diarrhea but stop 4 days ago, COVID test was positive on 01/12/2022---no covid vaccine--patient may have been exposed to COVID at work is COVID.  Treated with nausea--ibuprofen because sides were hurting from coughing--cough medication. Not working since 1---needs note okay to go back to work and note that she had COVID.      Had EMG study of the lower extremity       ROS:    Skin: no psoriasis, eczema, skin cancer  HEENT: No headache, ocular pain, blurred vision, diplopia, epistaxis, hoarseness+change in voice  When sick -- , thyroid trouble  Lung: No pneumonia, asthma, Tb, wheezing, SOB, smoking half pack per day  Heart: No chest pain, no ankle edema, no  palpitations, MI, shane murmur, hypertension, hyperlipidemia  Abdomen: + nausea,+   vomiting  no  diarrhea, constipation, ulcers, hepatitis, gallbladder disease, melena, hematochezia, hematemesis  : no UTI, renal disease, stones  GYN LMP started 01/12/2022  MS: no fractures, O/A, lupus, rheumatoid, gout  Neuro: No dizziness, LOC, seizures   No diabetes, no anemia, no anxiety, n wants to get checked for diabetes o depression  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      Objective:   Physical Exam:    General: Well nourished, well developed, no acute distress +obesity  Skin: No lesions  HEENT: Eyes PERRLA, EOM intact, nose patent, throat non-erythematous ears  left TM slightly injected--yellow liquid in ear canal   NECK: Supple, no bruits, No JVD, no  nodes  Lungs: Clear, no rales, +rhonchi,+ wheezing  Heart: Regular rate and rhythm, no murmurs, gallops, or rubs  Abdomen: flat, bowel sounds positive, no tenderness, or organomegaly  MS: Range of motion and muscle strength intact pulses 2/4--history pens or screws in the right 4th 5th toes pulses appear normal  Neuro: Alert, CN intact, oriented X 3  Extremities: No cyanosis, clubbing, or edema         Assessment:       1. Encounter for screening mammogram for malignant neoplasm of breast    2. Routine gynecological examination    3. Fall, initial encounter    4. Polyneuropathy    5. Bronchitis    6. Elevated rheumatoid factor    7. Vitamin D deficiency    8. Folic acid deficiency    9. Elevated liver function tests    10. Tobacco abuse        Plan:       Encounter for screening mammogram for malignant neoplasm of breast    Routine gynecological examination  -     Ambulatory referral/consult to Obstetrics / Gynecology; Future; Expected date: 03/28/2022    Fall, initial encounter  -     WALKER FOR HOME USE    Polyneuropathy  -     Ambulatory referral/consult to Neurology; Future; Expected date: 03/28/2022  -     X-Ray Lumbar Spine 5 View; Future; Expected date: 03/21/2022  -     MRI Lumbar Spine Without Contrast; Future; Expected date: 03/21/2022  -     WALKER FOR HOME USE    Bronchitis    Elevated rheumatoid factor    Vitamin D deficiency  -     Ambulatory referral/consult to Rheumatology; Future; Expected date: 03/28/2022    Folic acid deficiency  -     FOLATE; Future; Expected date: 03/21/2022    Elevated liver function tests    Tobacco abuse    Other orders  -     cyanocobalamin 1,000 mcg/mL injection; 1 cc IM q.week times 10 then monthly   Needs 100 cc viual   Needs #10 25 gauage 5/8 inch needled  Needs #10 3 cc syringes   Give shot and deltoid area alternate arms  Dispense: 10 mL; Refill: 5  -     alendronate (FOSAMAX) 70 MG tablet; Take 1 tablet (70 mg total) by mouth every 7 days.  Dispense: 4 tablet; Refill:  11  -     meloxicam (MOBIC) 7.5 MG tablet; Take 1 tablet (7.5 mg total) by mouth 2 (two) times daily as needed for Pain.  Dispense: 60 tablet; Refill: 5  -     HYDROcodone-acetaminophen (NORCO) 5-325 mg per tablet; Take 1 tablet by mouth every 6 (six) hours as needed.  Dispense: 30 tablet; Refill: 0        Main Reason for Visit-  COVID infection 01/12/2022---nausea vomiting diarrhea until 4 days ago--still with upper respiratory symptoms of bronchospasm--Kenalog/prednisone taper/Phenergan codeine/Zithromax/chest x-ray-if positive for pneumonia will switch to Levaquin  Tobacco abuse Needs to DC smoking  Left earache with external otitis left ear canal Cortisporin drops 4 drops affected ear q.6 hours x7 days  -numbness in the feet numbness --saw Dr Reyes podiatrist ---needs EMG report--shows neuropathy but appears be metabolic or toxic Pt to review with rheumatologist   Positive rheumatoid factor with elevated sed rate see rheumatologist  Vitamin D 4 needs to be on vitamin-D 39578 units  Ankle edema 20 mg of Lasix 1 p.o. q.a.m. p.r.n. edema  Obesity needs to exercise try to get ideal body weight   Other medical issues  Lab redo  CBC,CMP,T4 TSH   Health maintenance hepatitis C tetanus mammogram colorectal screen COVID   Can do Pap smear in 3 months if desires                                               Subjective:       Patient ID: Camila Adan is a 51 y.o. female.    Chief Complaint: No chief complaint on file.    HPI:  50 yo WF left leg pain ---EMG study done of both legs showed polyneuropathy-nonspecific most commonly associated with metabolic derangements but also can be seen in toxic neuropathies and some endocrine abnormality--pt has not seen neurologist--patient has pain in the left anterior shin approximately 6-8 cm above the and up to the knee---when stands entire body shakes.  No recent trauma no excessive activity. Pt fell 3 x last week -- legs gave out .        Hx elevated liver function test--had acute  hepatitis panel negative--for hepatitis-A B and C had abdominal ultrasound just showing some hepatomegaly otherwise normal.      No lupus rheumatoid gout--no fracture.  Gabapentin 600 mg Flexeril not on NSAID's        Had femoral artery Dopplers done the past that were normal      Lab in August--B12 due low normal 243--folic acid low--vitamin-D low--rheumatoid factor positive--sed rate 41        Office visit 01/24/2022 51 yo WF --patient for checkup--feet pain--burning pain went away but stabbing pain like needles persisted.  Comes and goes.  Yesterday was at work--had episode --like needle last few seconds but hurts really bad. Occas walking gets spasm leg that kicks sideways . Pt saw Dr Reyes--gave pt something put between toes.  May 2021 had femoral artery Dopplers that were normal.     Ankles swollen everyday--season dentures of socks on ankles daily.     Patient was not called about labs done in May hematocrit 36.1 mild anemia  AS T 69 ALT 61 vitamin-D 4 sed rate 40 rheumatoid factor 28       ROS:    Skin: no psoriasis, eczema, skin cancer  HEENT: No headache, ocular pain, blurred vision, diplopia, epistaxis, hoarseness change in voice, thyroid trouble  Lung: No pneumonia, asthma, Tb, wheezing, SOB, smoking half pack per day  Heart: No chest pain, +ankle edema--were swollen other day--had call work cook , no  palpitations, MI, shane murmur, hypertension, hyperlipidemia  Abdomen: No nausea,+ vomiting alot not everyday   no  diarrhea, constipation, ulcers, hepatitis, gallbladder disease, melena, hematochezia, hematemesis  : no UTI, renal disease, stones  GYN LMP not sure --going thru menopause   MS: no fractures, O/A, lupus, rheumatoid, gout--bilateral leg pain left much greater than right--the bilateral polyp neuropathy  Neuro: No dizziness, LOC, seizures   No diabetes, no anemia, no anxiety, no depression  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      Objective:   Physical  Exam:    General: Well nourished, well developed, no acute distress +obesity  Skin: No lesions  HEENT: Eyes PERRLA, EOM intact, nose patent, throat non-erythematous ears  left TM slightly injected  NECK: Supple, no bruits, No JVD, no nodes  Lungs: Clear, no rales, rhonchi, wheezing  Heart: Regular rate and rhythm, no murmurs, gallops, or rubs  Abdomen: flat, bowel sounds positive, no tenderness, or organomegaly  MS:  Difficulty getting out of wheelchair to ambulate--states feels like going the fall---weakness in legs bilaterally left greater than might---tremor of the left lower leg with opposition to flexion extension of the foot--flexion extension lower leg---no crepitus--unable to check back patient unable to stand states legs get  Neuro: Alert, CN intact, oriented X 3  Extremities: No cyanosis, clubbing, or edema         Assessment:       1. Encounter for screening mammogram for malignant neoplasm of breast    2. Routine gynecological examination    3. Fall, initial encounter    4. Polyneuropathy    5. Bronchitis    6. Elevated rheumatoid factor    7. Vitamin D deficiency    8. Folic acid deficiency    9. Elevated liver function tests    10. Tobacco abuse        Plan:       Encounter for screening mammogram for malignant neoplasm of breast    Routine gynecological examination  -     Ambulatory referral/consult to Obstetrics / Gynecology; Future; Expected date: 03/28/2022    Fall, initial encounter  -     WALKER FOR HOME USE    Polyneuropathy  -     Ambulatory referral/consult to Neurology; Future; Expected date: 03/28/2022  -     X-Ray Lumbar Spine 5 View; Future; Expected date: 03/21/2022  -     MRI Lumbar Spine Without Contrast; Future; Expected date: 03/21/2022  -     WALKER FOR HOME USE    Bronchitis    Elevated rheumatoid factor    Vitamin D deficiency  -     Ambulatory referral/consult to Rheumatology; Future; Expected date: 03/28/2022    Folic acid deficiency  -     FOLATE; Future; Expected date:  03/21/2022    Elevated liver function tests    Tobacco abuse    Other orders  -     cyanocobalamin 1,000 mcg/mL injection; 1 cc IM q.week times 10 then monthly   Needs 100 cc viual   Needs #10 25 gauage 5/8 inch needled  Needs #10 3 cc syringes   Give shot and deltoid area alternate arms  Dispense: 10 mL; Refill: 5  -     alendronate (FOSAMAX) 70 MG tablet; Take 1 tablet (70 mg total) by mouth every 7 days.  Dispense: 4 tablet; Refill: 11  -     meloxicam (MOBIC) 7.5 MG tablet; Take 1 tablet (7.5 mg total) by mouth 2 (two) times daily as needed for Pain.  Dispense: 60 tablet; Refill: 5  -     HYDROcodone-acetaminophen (NORCO) 5-325 mg per tablet; Take 1 tablet by mouth every 6 (six) hours as needed.  Dispense: 30 tablet; Refill: 0        Main Reason for Visit-  -numbness in the feet numbness --saw Dr Reyes podiatrist no relief--some relief with gabapentin---May 2021 femoral artery Dopplers showed no stenosis--patient convinced has neurological disorder will get EMG of the lower legs may increase dose of gabapentin continue NSAID and Ultram  Lab anemia hematocrit 36.1 mild will not workup at this time  Elevated MCV will get B12 folic acid levels  Elevated liver function test AST 69 ALT 61 will get acute hepatitis panel and ultrasound of the abdomen to evaluate liver spleen and gallbladder  Positive rheumatoid factor with elevated sed rate see rheumatologist  Vitamin D 4 needs to be on vitamin-D 68936 units  Ankle edema 20 mg of Lasix 1 p.o. q.a.m. p.r.n. edema  Obesity needs to exercise try to get ideal body weight   Other medical issues  Tobacco abuse half pack per day patient Needs to DC smoking told can cause vascular constriction time 45 min with smoking each cigarette  Lab redo 6 mo CBC,CMP,Vit D  Health maintenance coccal vaccine COVID vaccine mammogram Pap smear shingles colorectal screen   Can do Pap smear in 3 months if desires  Lab now B12/folic acid/acute hepatitis panel/abdominal ultrasound--referral to  rheumatologist--start vitamin D

## 2022-03-22 ENCOUNTER — TELEPHONE (OUTPATIENT)
Dept: PRIMARY CARE CLINIC | Facility: CLINIC | Age: 52
End: 2022-03-22
Payer: MEDICAID

## 2022-03-22 NOTE — TELEPHONE ENCOUNTER
----- Message from Janel Huff LPN sent at 3/21/2022  4:01 PM CDT -----  Contact: Self [phone]    ----- Message -----  From: Abimbola Fletcher  Sent: 3/21/2022   3:36 PM CDT  To: Santi Carlisle Staff    Pt requesting a call in regards to chair provider was suppose to order.    Please call and advise

## 2022-03-22 NOTE — TELEPHONE ENCOUNTER
----- Message from Amie Mane sent at 3/22/2022  1:52 PM CDT -----  Please fax referral demographics sheet to [facility] fax [phone] and also Rheumatology

## 2022-03-22 NOTE — TELEPHONE ENCOUNTER
Called and spoke with patient, patient wants the [facility] to [facility],  Therefore order faxed to [phone]

## 2022-04-04 ENCOUNTER — TELEPHONE (OUTPATIENT)
Dept: PRIMARY CARE CLINIC | Facility: CLINIC | Age: 52
End: 2022-04-04
Payer: MEDICAID

## 2022-04-04 NOTE — TELEPHONE ENCOUNTER
I spoke with the patient. She said her back is still hurting really bad. She's having a hard time getting around, even with the walker. The MRI still hasn't been approved, but I let her know we at least want the X-Ray and labs from her. She said her friend can bring her in this Thursday. She also mentioned the NORCO aren't helping, and Tramadol didn't work in the past. She's requesting to try something different for pain.

## 2022-04-04 NOTE — TELEPHONE ENCOUNTER
----- Message from Abimbola Fletcher sent at 4/4/2022  4:30 PM CDT -----  Contact: self [phone]  Pt stated have leg pain in both legs now and can't walk and stated the medication does not work and also have a hard time scheduling MRI.      [email or web address] [place] - [address]  [address] [place] [address]  Phone: [phone] Fax: [phone]      Please call and advise

## 2022-04-25 ENCOUNTER — PATIENT OUTREACH (OUTPATIENT)
Dept: ADMINISTRATIVE | Facility: OTHER | Age: 52
End: 2022-04-25
Payer: MEDICAID

## 2022-04-25 NOTE — PROGRESS NOTES
LINKS immunization registry updated  Care Everywhere updated  Health Maintenance updated  DIS/Chart reviewed for overdue Proactive [facility] Encounters (SHERINE) health maintenance testing (CRS, Breast Ca, Diabetic Eye Exam)   Orders entered:N/A

## 2022-04-28 NOTE — TELEPHONE ENCOUNTER
Refill Routing Note   Medication(s) are not appropriate for processing by [facility] for the following reason(s):      - Required vitals are abnormal               Medication reconciliation completed: No     Appointments  past 12m or future 3m with PCP    Date Provider   Last Visit   3/21/2022 Orestes Hancock MD   Next Visit   Visit date not found Orestes Hancock MD

## 2022-04-28 NOTE — TELEPHONE ENCOUNTER
No new care gaps identified.  Powered by [employer]. Reference number: [id number].   4/28/2022 8:05:40 AM CDT

## 2022-04-29 ENCOUNTER — TELEPHONE (OUTPATIENT)
Dept: PRIMARY CARE CLINIC | Facility: CLINIC | Age: 52
End: 2022-04-29
Payer: MEDICAID
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
## 2022-04-29 DIAGNOSIS — M72.2 PLANTAR FASCIITIS: ICD-10-CM

## 2022-04-29 RX ORDER — ALBUTEROL SULFATE 90 UG/1
AEROSOL, METERED RESPIRATORY (INHALATION)
Qty: 54 G | Refills: 3 | Status: SHIPPED | OUTPATIENT
Start: 2022-04-29

## 2022-04-29 NOTE — TELEPHONE ENCOUNTER
----- Message from Dwayne Fletcher sent at 4/29/2022 11:41 AM CDT -----  Contact: pt  Pt is calling to speak with you in regards to ordering a 3 prongs cane and also a  small shower and bath tub chair.Please advise

## 2022-05-05 ENCOUNTER — PATIENT MESSAGE (OUTPATIENT)
Dept: SMOKING CESSATION | Facility: CLINIC | Age: 52
End: 2022-05-05
Payer: MEDICAID

## 2022-05-05 NOTE — TELEPHONE ENCOUNTER
No new care gaps identified.  [facility] Embedded Care Gaps. Reference number: [id number]. 5/05/2022   1:49:52 PM CDT

## 2022-05-06 RX ORDER — GABAPENTIN 600 MG/1
TABLET ORAL
Qty: 90 TABLET | Refills: 2 | Status: SHIPPED | OUTPATIENT
Start: 2022-05-06 | End: 2022-07-21

## 2022-05-06 NOTE — TELEPHONE ENCOUNTER
Call tell pt gabapentin not usually refilled over the phone In future needs gt refills during office visits

## 2022-05-09 RX ORDER — POTASSIUM CHLORIDE 750 MG/1
CAPSULE, EXTENDED RELEASE ORAL
Qty: 90 CAPSULE | Refills: 1 | Status: SHIPPED | OUTPATIENT
Start: 2022-05-09 | End: 2022-10-17

## 2022-05-09 NOTE — TELEPHONE ENCOUNTER
No new care gaps identified.  [facility] Embedded Care Gaps. Reference number: [id number]. 5/09/2022   1:24:38 PM CDT

## 2022-05-09 NOTE — TELEPHONE ENCOUNTER
----- Message from Tara Odell sent at 5/9/2022 12:26 PM CDT -----  Contact: [phone]  Requesting an RX refill or new RX.  Is this a refill or new RX: refill  RX name and strength (copy/paste from chart):  potassium chloride (MICRO-K) 10 MEQ CpSR  Is this a 30 day or 90 day RX: 30  Pharmacy name and phone # (copy/paste from chart):    [facility] [place] [address] [place] [address]  Phone: [phone] Fax: [phone]     The doctors have asked that we provide their patients with the following 2 reminders -- prescription refills can take up to 72 hours, and a friendly reminder that in the future you can use your [facility] account to request refills: #pharm

## 2022-05-11 ENCOUNTER — PATIENT MESSAGE (OUTPATIENT)
Dept: ADMINISTRATIVE | Facility: HOSPITAL | Age: 52
End: 2022-05-11
Payer: MEDICAID

## 2022-05-13 ENCOUNTER — TELEPHONE (OUTPATIENT)
Dept: PRIMARY CARE CLINIC | Facility: CLINIC | Age: 52
End: 2022-05-13
Payer: MEDICAID

## 2022-05-13 NOTE — TELEPHONE ENCOUNTER
----- Message from Araceli Haskins sent at 5/13/2022  9:05 AM CDT -----  Contact: [phone]  Patient is calling for Medical Advice regarding: patient both ankles and feet are swollen and two toes on right foot are discolored and hurt very bad, patient has been using ice packs and elevating ankles , please call and advise.     How long has patient had these symptoms: 3 days    Pharmacy name and phone#:[facility] - [place], [place] - [address]   Phone:  [phone]  Fax:  [phone]          Would like response via IV Diagnosticst:  no    Comments:

## 2022-05-13 NOTE — TELEPHONE ENCOUNTER
Returned patients call. Patient stated that her leg and foot is numb so she isn't sure if she stumped it on something because her toes are a little purple. I advised patient that if things are to bad and painful she should go to the ER. I also asked if she would like to schedule an appt for next week and she said no. I also advised her of her medication she had that could help the pain. Patient stated she will go take that and see if it helps.

## 2022-05-18 NOTE — TELEPHONE ENCOUNTER
No new care gaps identified.  [facility] Embedded Care Gaps. Reference number: [id number]. 5/18/2022   1:01:47 PM CDT

## 2022-05-20 RX ORDER — CYCLOBENZAPRINE HCL 10 MG
TABLET ORAL
Qty: 30 TABLET | Refills: 5 | Status: SHIPPED | OUTPATIENT
Start: 2022-05-20 | End: 2022-08-31

## 2022-05-25 ENCOUNTER — OFFICE VISIT (OUTPATIENT)
Dept: PRIMARY CARE CLINIC | Facility: CLINIC | Age: 52
End: 2022-05-25
Payer: MEDICAID

## 2022-05-25 VITALS
WEIGHT: 209.69 LBS | BODY MASS INDEX: 33.7 KG/M2 | DIASTOLIC BLOOD PRESSURE: 88 MMHG | HEIGHT: 66 IN | OXYGEN SATURATION: 98 % | TEMPERATURE: 98 F | HEART RATE: 102 BPM | SYSTOLIC BLOOD PRESSURE: 138 MMHG | RESPIRATION RATE: 18 BRPM

## 2022-05-25 DIAGNOSIS — G62.9 POLYNEUROPATHY: ICD-10-CM
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
## 2022-05-25 DIAGNOSIS — E55.9 VITAMIN D DEFICIENCY: ICD-10-CM

## 2022-05-25 DIAGNOSIS — S96.919A STRAIN OF GREAT TOE: ICD-10-CM

## 2022-05-25 DIAGNOSIS — R70.0 ELEVATED SED RATE: ICD-10-CM

## 2022-05-25 DIAGNOSIS — R76.8 ELEVATED RHEUMATOID FACTOR: ICD-10-CM
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
## 2022-05-25 DIAGNOSIS — S20.20XA TRAUMATIC ECCHYMOSIS OF CHEST, INITIAL ENCOUNTER: Primary | ICD-10-CM

## 2022-05-25 DIAGNOSIS — E53.8 FOLIC ACID DEFICIENCY: ICD-10-CM

## 2022-05-25 DIAGNOSIS — G62.9 NEUROPATHY: ICD-10-CM

## 2022-05-25 DIAGNOSIS — R79.89 ELEVATED LIVER FUNCTION TESTS: ICD-10-CM

## 2022-05-25 DIAGNOSIS — Z72.0 TOBACCO ABUSE: ICD-10-CM

## 2022-05-25 PROCEDURE — 3008F BODY MASS INDEX DOCD: CPT | Mod: CPTII,,, | Performed by: FAMILY MEDICINE

## 2022-05-25 PROCEDURE — 99214 OFFICE O/P EST MOD 30 MIN: CPT | Mod: S$PBB,,, | Performed by: FAMILY MEDICINE

## 2022-05-25 PROCEDURE — 99214 PR OFFICE/OUTPT VISIT, EST, LEVL IV, 30-39 MIN: ICD-10-PCS | Mod: S$PBB,,, | Performed by: FAMILY MEDICINE

## 2022-05-25 PROCEDURE — 99215 OFFICE O/P EST HI 40 MIN: CPT | Mod: PBBFAC,PN | Performed by: FAMILY MEDICINE

## 2022-05-25 PROCEDURE — 3075F PR MOST RECENT SYSTOLIC BLOOD PRESS GE 130-139MM HG: ICD-10-PCS | Mod: CPTII,,, | Performed by: FAMILY MEDICINE

## 2022-05-25 PROCEDURE — 3079F PR MOST RECENT DIASTOLIC BLOOD PRESSURE 80-89 MM HG: ICD-10-PCS | Mod: CPTII,,, | Performed by: FAMILY MEDICINE

## 2022-05-25 PROCEDURE — 3008F PR BODY MASS INDEX (BMI) DOCUMENTED: ICD-10-PCS | Mod: CPTII,,, | Performed by: FAMILY MEDICINE

## 2022-05-25 PROCEDURE — 3075F SYST BP GE 130 - 139MM HG: CPT | Mod: CPTII,,, | Performed by: FAMILY MEDICINE

## 2022-05-25 PROCEDURE — 99999 PR PBB SHADOW E&M-EST. PATIENT-LVL V: ICD-10-PCS | Mod: PBBFAC,,, | Performed by: FAMILY MEDICINE

## 2022-05-25 PROCEDURE — 3079F DIAST BP 80-89 MM HG: CPT | Mod: CPTII,,, | Performed by: FAMILY MEDICINE

## 2022-05-25 PROCEDURE — 99999 PR PBB SHADOW E&M-EST. PATIENT-LVL V: CPT | Mod: PBBFAC,,, | Performed by: FAMILY MEDICINE

## 2022-05-25 NOTE — PROGRESS NOTES
Subjective:       Patient ID: Camila Adan is a 51 y.o. female.    Chief Complaint: Extremity Weakness    HPI: 51 WF--had EMG study showing polyneuropathy most likely associated with metabolic derangements or toxic min neuropathy  Three days ago patient fell--coming down the steps right leg gave at--fell down 4 steps--because no carpets on the steps a right chest wall hit the bear would and has a bruise between the mid axillary and posterior axillary line approximately 10th rib area bruise about the size of a small softball.  Patient also thinks may have fractured a toe great toe--as fell down the steps toe got caught in was bend patient not sure what direction.  Did not hit head no loss of consciousness--did not go to the emergency room. Was not really hurting initially--patient started having increased amounts of pain when using the walker--great toe--unable sleep on right side       If not holding on to something can not walk--uses a chair downstairs stairs to walk in a walker up stairs to walk        Two weeks ago patient fell in the parking lot injured right elbow and right knee.        MRI not covered because patient insurance would not approve.       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--tried even work 6 hours a day was unable to do unless holding on the some       Had burning and needles in the feet but that stop so the medication did help back       To OBGYN 07/23/2022        To liver MD--06/07/2022--elevated liver function test        History positive rheumatoid factor 28 increased sed rate 41      OFFICE VISIT 03/21/2022 50 yo WF left leg pain ---EMG study done of both legs showed  polyneuropathy-nonspecific most commonly associated with metabolic derangements but also can be seen in toxic neuropathies and some endocrine abnormality--pt has not seen neurologist--patient has pain in the left anterior shin approximately 6-8 cm above the and up to the knee---when stands entire body shakes.  No recent trauma no excessive activity. Pt fell 3 x last week -- legs gave out .        Hx elevated liver function test--had acute hepatitis panel negative--for hepatitis-A B and C had abdominal ultrasound just showing some hepatomegaly otherwise normal.      No lupus rheumatoid gout--no fracture.  Gabapentin 600 mg Flexeril not on NSAID's        Had femoral artery Dopplers done the past that were normal      Lab in August--B12 due low normal 243--folic acid low--vitamin-D low--rheumatoid factor positive--sed rate 41     ROS:  No significant change except history of present illness  Skin: no psoriasis, eczema, skin cancer--ecchymosis right chest wall see history of present illness  HEENT: No headache, ocular pain, blurred vision, diplopia, epistaxis, hoarseness change in voice, thyroid trouble  Lung: No pneumonia, asthma, Tb, wheezing, SOB, smoking half pack per day  Heart: No chest pain, +ankle edema--were swollen other day--had call work cook , no  palpitations, MI, shane murmur, hypertension, hyperlipidemia  Abdomen: No nausea,+ vomiting alot not everyday   no  diarrhea, constipation, ulcers, hepatitis, gallbladder disease, melena, hematochezia, hematemesis  : no UTI, renal disease, stones  GYN LMP not sure --going thru menopause   MS: no fractures, O/A, lupus, rheumatoid, gout--bilateral leg pain left much greater than right--the bilateral polyp neuropathy--contusion right great toe  Neuro: No dizziness, LOC, seizures   No diabetes, no anemia, no anxiety, no depression  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      Objective:   Physical Exam:    General: Well nourished, well  developed, no acute distress +obesity  Skin:  Ecchymosis right chest wall 9 x 6 and 0.5 cm  HEENT: Eyes PERRLA, EOM intact, nose patent, throat non-erythematous ears  left TM slightly injected  NECK: Supple, no bruits, No JVD, no nodes  Lungs: Clear, no rales, rhonchi, wheezing  Heart: Regular rate and rhythm, no murmurs, gallops, or rubs  Abdomen: flat, bowel sounds positive, no tenderness, or organomegaly  MS:  No significant change Difficulty getting out of wheelchair to ambulate--states feels like going the fall---weakness in legs bilaterally left greater than might---tremor of the left lower leg with opposition to flexion extension of the foot--flexion extension lower leg---no crepitus--unable to check back patient unable to stand states legs get---1 patient stands legs again the shake gets weak like going to fall has to sit down  Neuro: Alert, CN intact, oriented X 3  Extremities: No cyanosis, clubbing, or edema         Assessment:       1. Traumatic ecchymosis of chest, initial encounter    2. Strain of great toe    3. Elevated liver function tests    4. Elevated rheumatoid factor    5. Elevated sed rate    6. Fall, initial encounter    7. Tobacco abuse    8. Fall, subsequent encounter    9. Vitamin D deficiency    10. Folic acid deficiency    11. Neuropathy    12. Polyneuropathy        Plan:       Traumatic ecchymosis of chest, initial encounter  -     X-Ray Ribs 2 View Right; Future; Expected date: 05/25/2022  -     X-Ray Chest PA And Lateral; Future; Expected date: 05/25/2022    Strain of great toe  -     X-Ray Toe 2 or More Views Right; Future; Expected date: 05/25/2022    Elevated liver function tests    Elevated rheumatoid factor  -     Sedimentation rate; Future; Expected date: 05/25/2022  -     C-reactive protein; Future; Expected date: 05/25/2022  -     Ambulatory referral/consult to Rheumatology; Future; Expected date: 06/01/2022    Elevated sed rate    Fall, initial encounter  -     Ambulatory  referral/consult to Neurology; Future; Expected date: 06/01/2022  -     MRI Lumbar Spine Without Contrast; Future; Expected date: 05/25/2022    Tobacco abuse    Fall, subsequent encounter    Vitamin D deficiency    Folic acid deficiency    Neuropathy    Polyneuropathy        Main Reason for Visit-  Falls --right leg gives out--but both legs get weak and shake---MRI add lumbar spine was denied--x-ray shows spondylosis with narrowing of the disc spaces facet arthropathy osteophytes  +elevated rheumatoid factor see rheumatologist sed rate 41--redo sed rate CRP  Neurology appointment--patient had EMG showing neuropathy bilateral--either toxic or metabolic  Elevated liver function tests see hepatologist  -numbness in the feet numbness --saw Dr Reyes podiatrist no relief--some relief with gabapentin---May 2021 femoral artery Dopplers showed no stenosis  Lab in Health maintenance will be addressed later  Return 6 weeks due to recent falls

## 2022-05-25 NOTE — PATIENT INSTRUCTIONS
Fall  Xray right great toe  Chest xray  Xray of both ribs    Positive rheumatoid factor  See Rheumatologist    Increased liver function test see hepatology

## 2022-05-31 ENCOUNTER — PATIENT MESSAGE (OUTPATIENT)
Dept: ADMINISTRATIVE | Facility: HOSPITAL | Age: 52
End: 2022-05-31
Payer: MEDICAID

## 2022-05-31 ENCOUNTER — TELEPHONE (OUTPATIENT)
Dept: PRIMARY CARE CLINIC | Facility: CLINIC | Age: 52
End: 2022-05-31
Payer: MEDICAID

## 2022-05-31 DIAGNOSIS — S92.401A CLOSED DISPLACED FRACTURE OF PHALANX OF RIGHT GREAT TOE, UNSPECIFIED PHALANX, INITIAL ENCOUNTER: Primary | ICD-10-CM

## 2022-05-31 NOTE — TELEPHONE ENCOUNTER
Returned patients call. Informed patient that I faxed the orders for her shower chair and cane to [facility]. I also looked for her disability papers. I will see if the papers were faxed over if not I will fax them.

## 2022-05-31 NOTE — TELEPHONE ENCOUNTER
----- Message from Catalina Ribeiro sent at 5/31/2022 10:22 AM CDT -----  Regarding: advice  Contact: rosemarie [phone]  Following up on request for 3 prong cane and shower chair. Also inquiring about disability form.  Was it completed.   Please call and advise.

## 2022-06-02 NOTE — TELEPHONE ENCOUNTER
----- Message from Jenn Norton sent at 6/2/2022  3:43 PM CDT -----  Contact: Patient, [phone]  Calling to request medication for fluid in her ankles. Please advise. Thanks.      [facility]  [address] [place] [address]  Phone: [phone] Fax: [phone]

## 2022-06-03 ENCOUNTER — TELEPHONE (OUTPATIENT)
Dept: PRIMARY CARE CLINIC | Facility: CLINIC | Age: 52
End: 2022-06-03
Payer: MEDICAID

## 2022-06-03 NOTE — TELEPHONE ENCOUNTER
----- Message from Susanne Lynn Zackary sent at 6/3/2022  9:22 AM CDT -----  Contact: pt [phone]  2nd Request    Stated that her ankle is swollen and needs a Rx before the weekend.    [facility] - [place], [place] - [address]  [address] [place] [address]  Phone: [phone] Fax: [phone]    Please call and advise.    Thank You

## 2022-06-03 NOTE — TELEPHONE ENCOUNTER
Returned patients call. Patient stated that her toe and foot is hurting. She asked if she can get some medication to help. I let her know I would send Dr. Hancock a message to see what he thinks she should do. I informed patient until then she can take her meloxicam. It states on the script she can take it twice a day if needed.

## 2022-06-06 RX ORDER — FUROSEMIDE 20 MG/1
20 TABLET ORAL EVERY MORNING
Qty: 30 TABLET | Refills: 2 | Status: SHIPPED | OUTPATIENT
Start: 2022-06-06 | End: 2022-08-22

## 2022-06-06 NOTE — TELEPHONE ENCOUNTER
No new care gaps identified.  [facility] Embedded Care Gaps. Reference number: [id number]. 6/06/2022   9:19:22 AM CDT

## 2022-07-11 ENCOUNTER — PATIENT MESSAGE (OUTPATIENT)
Dept: ADMINISTRATIVE | Facility: HOSPITAL | Age: 52
End: 2022-07-11
Payer: MEDICAID

## 2022-07-21 ENCOUNTER — OFFICE VISIT (OUTPATIENT)
Dept: PRIMARY CARE CLINIC | Facility: CLINIC | Age: 52
End: 2022-07-21
Payer: MEDICAID

## 2022-07-21 VITALS
TEMPERATURE: 98 F | HEIGHT: 66 IN | SYSTOLIC BLOOD PRESSURE: 138 MMHG | BODY MASS INDEX: 33.59 KG/M2 | HEART RATE: 115 BPM | WEIGHT: 209 LBS | OXYGEN SATURATION: 98 % | DIASTOLIC BLOOD PRESSURE: 86 MMHG | RESPIRATION RATE: 18 BRPM

## 2022-07-21 DIAGNOSIS — G62.9 NEUROPATHY: ICD-10-CM

## 2022-07-21 DIAGNOSIS — Z72.0 TOBACCO ABUSE: ICD-10-CM

## 2022-07-21 DIAGNOSIS — E55.9 VITAMIN D DEFICIENCY: ICD-10-CM

## 2022-07-21 DIAGNOSIS — G62.9 POLYNEUROPATHY: ICD-10-CM

## 2022-07-21 DIAGNOSIS — J98.01 BRONCHOSPASM: ICD-10-CM

## 2022-07-21 DIAGNOSIS — R11.2 NAUSEA VOMITING AND DIARRHEA: ICD-10-CM

## 2022-07-21 DIAGNOSIS — E53.8 FOLIC ACID DEFICIENCY: ICD-10-CM

## 2022-07-21 DIAGNOSIS — R19.7 NAUSEA VOMITING AND DIARRHEA: ICD-10-CM

## 2022-07-21 DIAGNOSIS — Z12.31 VISIT FOR SCREENING MAMMOGRAM: ICD-10-CM

## 2022-07-21 DIAGNOSIS — J40 BRONCHITIS: Primary | ICD-10-CM
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
## 2022-07-21 PROCEDURE — 3008F PR BODY MASS INDEX (BMI) DOCUMENTED: ICD-10-PCS | Mod: CPTII,,, | Performed by: FAMILY MEDICINE

## 2022-07-21 PROCEDURE — 3079F PR MOST RECENT DIASTOLIC BLOOD PRESSURE 80-89 MM HG: ICD-10-PCS | Mod: CPTII,,, | Performed by: FAMILY MEDICINE

## 2022-07-21 PROCEDURE — 3075F SYST BP GE 130 - 139MM HG: CPT | Mod: CPTII,,, | Performed by: FAMILY MEDICINE

## 2022-07-21 PROCEDURE — 3079F DIAST BP 80-89 MM HG: CPT | Mod: CPTII,,, | Performed by: FAMILY MEDICINE

## 2022-07-21 PROCEDURE — 99214 PR OFFICE/OUTPT VISIT, EST, LEVL IV, 30-39 MIN: ICD-10-PCS | Mod: S$PBB,,, | Performed by: FAMILY MEDICINE

## 2022-07-21 PROCEDURE — 99999 PR PBB SHADOW E&M-EST. PATIENT-LVL IV: CPT | Mod: PBBFAC,,, | Performed by: FAMILY MEDICINE

## 2022-07-21 PROCEDURE — 3075F PR MOST RECENT SYSTOLIC BLOOD PRESS GE 130-139MM HG: ICD-10-PCS | Mod: CPTII,,, | Performed by: FAMILY MEDICINE

## 2022-07-21 PROCEDURE — 3008F BODY MASS INDEX DOCD: CPT | Mod: CPTII,,, | Performed by: FAMILY MEDICINE

## 2022-07-21 PROCEDURE — 99214 OFFICE O/P EST MOD 30 MIN: CPT | Mod: S$PBB,,, | Performed by: FAMILY MEDICINE

## 2022-07-21 PROCEDURE — 99214 OFFICE O/P EST MOD 30 MIN: CPT | Mod: PBBFAC,PN | Performed by: FAMILY MEDICINE

## 2022-07-21 PROCEDURE — 99999 PR PBB SHADOW E&M-EST. PATIENT-LVL IV: ICD-10-PCS | Mod: PBBFAC,,, | Performed by: FAMILY MEDICINE

## 2022-07-21 RX ORDER — AZITHROMYCIN 250 MG/1
TABLET, FILM COATED ORAL
Qty: 6 TABLET | Refills: 0 | Status: SHIPPED | OUTPATIENT
Start: 2022-07-21 | End: 2022-07-25

## 2022-07-21 RX ORDER — ERGOCALCIFEROL 1.25 MG/1
CAPSULE ORAL
Qty: 12 CAPSULE | Refills: 3 | Status: SHIPPED | OUTPATIENT
Start: 2022-07-21 | End: 2023-07-06

## 2022-07-21 RX ORDER — GABAPENTIN 600 MG/1
600 TABLET ORAL 3 TIMES DAILY
Qty: 90 TABLET | Refills: 2 | Status: SHIPPED | OUTPATIENT
Start: 2022-07-21 | End: 2022-10-23

## 2022-07-21 RX ORDER — PROMETHAZINE HYDROCHLORIDE AND CODEINE PHOSPHATE 6.25; 1 MG/5ML; MG/5ML
5 SOLUTION ORAL EVERY 6 HOURS PRN
Qty: 180 ML | Refills: 0 | Status: SHIPPED | OUTPATIENT
Start: 2022-07-21 | End: 2022-10-12

## 2022-07-21 RX ORDER — DIPHENOXYLATE HYDROCHLORIDE AND ATROPINE SULFATE 2.5; .025 MG/1; MG/1
TABLET ORAL
Qty: 30 TABLET | Refills: 1 | Status: SHIPPED | OUTPATIENT
Start: 2022-07-21 | End: 2022-10-12

## 2022-07-21 NOTE — PROGRESS NOTES
Subjective:       Patient ID: Camila Adan is a 51 y.o. female.    Chief Complaint: Cough and Follow-up    HPI: 50 yo WF--6 week follow-up/cough  Patient was around someone with strep th--about 6 days ago--history of subjective fever--+runny stuffy nose--+sore throat--+cough--dry.  No pneumonia TB--+history of asthma uses it intermittently. 3 days ago N.& V none now . Diarhhea --having 4-6 BM's day was better last night . No covid shot        Polyneuopathy today best legs have felt-- today use a cane to walk quarter of s block Goes to podiatry .  Trying get MRI LS spine refused . Needs see neurologist.        Started with burning bottom foot--like being stabbed bottom feet. Numbess lower legs bilaterall--feet to just below knees bilaterally         51 WF--had EMG study showing polyneuropathy most likely associated with metabolic derangements or toxic min neuropathy  Three days ago patient fell--coming down the steps right leg gave at--fell down 4 steps--because no carpets on the steps a right chest wall hit the bear would and has a bruise between the mid axillary and posterior axillary line approximately 10th rib area bruise about the size of a small softball.  Patient also thinks may have fractured a toe great toe--as fell down the steps toe got caught in was bend patient not sure what direction.  Did not hit head no loss of consciousness--did not go to the emergency room. Was not really hurting initially--patient started having increased amounts of pain when using the walker--great toe--unable sleep on right side       If not holding on to something can not walk--uses a chair downstairs stairs to walk in a walker up stairs to walk        Two weeks ago patient fell in the parking lot injured right elbow and right knee.        MRI not covered because patient insurance would not approve.       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--tried even work 6 hours a day was unable to do unless holding on the some       Had burning and needles in the feet but that stop so the medication did help back       To OBGYN 07/23/2022        To liver MD--06/07/2022--elevated liver function test        History positive rheumatoid factor 28 increased sed rate 41       ROS:  No significant change except history of present illness  Skin: no psoriasis, eczema, skin cancer--ecchymosis right chest wall see history of present illness  HEENT: No headache, ocular pain, blurred vision, diplopia, epistaxis, hoarseness change in voice, thyroid trouble  Lung: No pneumonia, asthma, Tb, wheezing, SOB, smoking half pack per day  Heart: No chest pain, +ankle edema--were swollen other day--had call work cook , no  palpitations, MI, shane murmur, hypertension, hyperlipidemia  Abdomen: No nausea,+ vomiting alot not everyday   no  diarrhea, constipation, ulcers, hepatitis, gallbladder disease, melena, hematochezia, hematemesis  : no UTI, renal disease, stones  GYN LMP not sure --going thru menopause   MS: no fractures, O/A, lupus, rheumatoid, gout--bilateral leg pain left much greater than right--the bilateral polyp neuropathy--contusion right great toe  Neuro: No dizziness, LOC, seizures   No diabetes, no anemia, no anxiety, no depression  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      Objective:   Physical Exam:    General: Well nourished, well developed, no acute distress +obesity  Skin:  Ecchymosis right chest wall 9 x 6 and 0.5 cm  HEENT: Eyes PERRLA, EOM intact, nose patent, throat non-erythematous ears  left TM slightly injected  NECK: Supple, no bruits, No JVD, no nodes  Lungs: Clear, no rales,  rhonchi, wheezing  Heart: Regular rate and rhythm, no murmurs, gallops, or rubs  Abdomen: flat, bowel sounds positive, no tenderness, or organomegaly  MS:  No significant change Difficulty getting out of wheelchair to ambulate--states feels like going the fall---weakness in legs bilaterally left greater than might---tremor of the left lower leg with opposition to flexion extension of the foot--flexion extension lower leg---no crepitus--unable to check back patient unable to stand states legs get---1 patient stands legs again the shake gets weak like going to fall has to sit down  Neuro: Alert, CN intact, oriented X 3  Extremities: No cyanosis, clubbing, or edema         Assessment:       1. Bronchitis    2. Nausea vomiting and diarrhea    3. Tobacco abuse    4. Fall, subsequent encounter    5. Folic acid deficiency    6. Vitamin D deficiency    7. Bronchospasm    8. Polyneuropathy    9. Neuropathy    10. Visit for screening mammogram        Plan:       Bronchitis  -     POCT Rapid Strep A  -     POCT COVID-19 Rapid Screening    Nausea vomiting and diarrhea    Tobacco abuse    Fall, subsequent encounter    Folic acid deficiency    Vitamin D deficiency    Bronchospasm    Polyneuropathy  -     Ambulatory referral/consult to Neurology; Future; Expected date: 07/28/2022  -     Ambulatory Referral/consult to [facility] - Physical Therapy; Future; Expected date: 07/28/2022    Neuropathy    Visit for screening mammogram  -     Mammo Digital Screening Bilat w/ Jason; Future; Expected date: 07/21/2022    Other orders  -     gabapentin (NEURONTIN) 600 MG tablet; Take 1 tablet (600 mg total) by mouth 3 (three) times daily.  Dispense: 90 tablet; Refill: 2  -     ergocalciferol (ERGOCALCIFEROL) 50,000 unit Cap; TAKE ONE CAPSULE BY MOUTH every SEVEN DAYS  Dispense: 12 capsule; Refill: 3  -     diphenoxylate-atropine 2.5-0.025 mg (LOMOTIL) 2.5-0.025 mg per tablet; One after each loose bowel movement up to 4 per day   Dispense: 30 tablet; Refill: 1  -     azithromycin (Z-RUDY) 250 MG tablet; 2 tabs by mouth day 1, then 1 tab by mouth daily x 4 days  Dispense: 6 tablet; Refill: 0  -     promethazine-codeine 6.25-10 mg/5 ml (PHENERGAN WITH CODEINE) 6.25-10 mg/5 mL syrup; Take 5 mLs by mouth every 6 (six) hours as needed for Cough.  Dispense: 180 mL; Refill: 0        Main Reason for Visit-  URI cough --zithromax/phenergan with codiene--diarrhea --lomotil --COVID swab of strep screen  Numbness feet to just below knees --has appt with podiatrist  Neuropathy unable walk excepty up with cane and minimal distance   Wants PT  Other medical issues  Falls --right leg gives out--but both legs get weak and shake---MRI add lumbar spine was denied--x-ray shows spondylosis with narrowing of the disc spaces facet arthropathy osteophytes  +elevated rheumatoid factor see rheumatologist sed rate 41--redo sed rate CRP  Neurology appointment--patient had EMG showing neuropathy bilateral--either toxic or metabolic  Elevated liver function tests see hepatologist  -numbness in the feet numbness --saw Dr Reyes podiatrist no relief--some relief with gabapentin---May 2021 femoral artery Dopplers showed no stenosis  Lab in Health maintenance will be addressed later  Return 6 weeks due to recent falls

## 2022-08-04 ENCOUNTER — CLINICAL SUPPORT (OUTPATIENT)
Dept: REHABILITATION | Facility: HOSPITAL | Age: 52
End: 2022-08-04
Attending: FAMILY MEDICINE
Payer: MEDICAID

## 2022-08-04 DIAGNOSIS — M21.371 FOOT DROP, RIGHT: ICD-10-CM

## 2022-08-04 DIAGNOSIS — R29.898 RIGHT LEG WEAKNESS: ICD-10-CM

## 2022-08-04 DIAGNOSIS — G62.9 POLYNEUROPATHY: ICD-10-CM

## 2022-08-04 DIAGNOSIS — Z74.09 IMPAIRED FUNCTIONAL MOBILITY, BALANCE, GAIT, AND ENDURANCE: ICD-10-CM

## 2022-08-04 PROCEDURE — 97162 PT EVAL MOD COMPLEX 30 MIN: CPT | Mod: PO

## 2022-08-04 NOTE — PLAN OF CARE
[facility]  Physical Therapy Neurological Rehabilitation Initial Evaluation    Name: Camila Adan  Clinic Number: [id number]    Therapy Diagnosis:   Encounter Diagnoses   Name Primary?    Polyneuropathy     Impaired functional mobility, balance, gait, and endurance     Foot drop, right     Right leg weakness      Physician: Orestes Hancock MD    Physician Orders:  PT eval and treat  Medical Diagnosis from Referral: polyneuropathy  Evaluation Date: 8/4/2022  Authorization Period Expiration: 12/31/22  Plan of Care Expiration: 9/29/22  Visit # / Visits authorized: 1/ 1    Time In: 0815  Time Out: 0902  Total Billable Time: 47 minutes    Precautions: Standard and Fall    Subjective   Date of onset: > 1yr  History of current condition - Camila reports: she first began with symptoms of burning and feeling like she was being stabbed with a fork in the bottoms of both feet. She no longer gets this feeling but endorses numbness and coldness to B feet. Pt also endorses tingling long term up both calves. Pt reports she loses her balance a lot and has experienced repeated falls. She uses rollator and LBQC for ambulation, though she comes to her appointment today without either. Her roommate assists her to ambulate today so that she does not lose her balance.     Medical History:   Past Medical History:   Diagnosis Date    Allergy     Bronchitis        Surgical History:   Camila Adan  has a past surgical history that includes Tubal ligation.    Medications:   Camila has a current medication list which includes the following prescription(s): albuterol, alendronate, cyanocobalamin, cyclobenzaprine, diphenoxylate-atropine 2.5-0.025 mg, ergocalciferol, furosemide, gabapentin, meloxicam, ondansetron, potassium chloride, and promethazine-codeine 6.25-10 mg/5 ml.    Allergies:   Review of patient's allergies indicates:  No Known Allergies     Imaging, X-rays lumbar spine, 4/7/22:  "    FINDINGS:  No  fracture, subluxation or bone destruction.  Transitional anatomy at the lumbosacral junction.  Sagittal alignment maintained.  Multilevel lumbar spondylosis with intervertebral disc space narrowing, endplate sclerosis and marginal osteophyte formation.  There is also facet arthropathy at the lower lumbar levels.  Surrounding soft tissue structures show no significant abnormalities.     Impression:     Lumbar spondylosis.  No acute bony abnormalities.     MRI lumbar spine ordered.    Prior Therapy: had therapy in [place] 2 x weekly for about 3 months~ insurance stopped this about 8 months ago   Social History: Lives with a male roommate  Falls: Pt fell 2 days ago going down some steps~ R leg gave out. Pt thinks she has had about 7 falls in the past 6 months  DME: rollator, LBQC, tub bench  Home Environment: townhouse~ 14 steps to get to her apartment~ 11 steps to 2nd floor~ rails present  Exercise Routine / History: does some simple leg exercises at home but gets frustrated when performing  Family Present at time of Eval: roommate, Kevin, but he stays in waiting room  Occupation: [employer], but hasn't worked since March 28 of this year  Prior Level of Function: independent prior to onset of symptoms but did not drive  Current Level of Function: use RW or LBQC or holds onto roommate to walk~ uses scooter at grocery, not currently working or driving    Pain:  Current 0/10, worst 0/10, best 0/10   Location: N/A     Pts goals: " to be able to walk properly and balance myself."    Objective     Observation: pleasant and cooperative   Speech: clear    Mental status: alert, oriented to person, place, and time  Appearance: Casually dressed and Well groomed  Behavior:  calm, cooperative and adequate rapport can be established  Attention Span and Concentration:  Normal    Posture Alignment :unremarkable in sitting. In standing, pt demonstrates anterior pelvic tilt with increased lumbar extension.    Dominant " hand:  right     Skin integrity:  Abrasions to L forearm and lateral thigh from a recent fall    Visual/Auditory: denies changes   Tracking: NT  Saccades:NT  Acuity:NT~ pt wears reading glasses  R/L discrimination: NT  Visual field: NT    ROM:   GROSS AROM/PROM  UPPER EXTREMITY  (R) UE: WFL  (L) UE: WFL  LOWER EXTREMITY  (R) LE: WFL as observed during B LE MMT  (L) LE: WFL as observed during B LE MMT       Lower Extremity Strength  Right LE  Left LE    Hip flexion:  4-/5 Hip flexion: 4-/5   Knee extension: 5/5 Knee extension: 5/5   Knee flexion: 4+/5 Knee flexion: 4+/5   Ankle dorsiflexion:  0/5 Ankle dorsiflexion: 4/5   Ankle plantarflexion:  4/5 Ankle plantarflexion: 5/5   Hip abduction: 4+/5 Hip abduction: 5/5   Hip adduction: 4+/5 Hip adduction 5/5   Hip extension: 5/5 Hip extension: 5/5     Upper extremity strength: not tested formally, but appears functional    Coordination:   Rapid Alternating Movements: moderately impaired to B UE  Point to Point:    -Finger to Nose: WFL R UE, very slight dysmetria L UE   -Heel to Shin: NT    Sensation: decreased to lower leg and severely decreased to feet  Proprioception: NT    Tone/Spasticity: No abnormal tone or spasticity noted to B UE or LE    Functional Mobility (Bed mobility, transfers)  Pt can perform mod I if she has UE support       Evaluation   Single Limb Stance R LE NT  (<10 sec = HIGH FALL RISK)   Single Limb Stance L LE NT  (<10 sec = HIGH FALL RISK)   30 second Chair Rise NT   5 times sit to stand 22 seconds~ slightly short of full knee extension   TUG 30 seconds with RW, SBA   Self selected walking speed 0.46 m/sec (6m/13s) with RW, SBA   Fast walking speed NT     5 x sit<> stand > 12 seconds indicates fall risk    Postural control:  MCTSIB:  1. Eyes Open/feet together/Firm: 30 seconds, P  2. Eyes Closed/feet together/Firm: 3 seconds, F  3. Eyes Open/feet together/Foam: 14 seconds, F  4. Eyes Closed/feet together/Foam: 2 seconds, F        Gait Assessment:    - AD used: none, RW  - Assistance: min A with HHA with no AD, SBA with RW  - Stairs: NT due to time constraints    · GAIT DEVIATIONS:   Camila displays the following deviations with ambulation: decreased nicole, decreased stride length, poor heel contact, steppage gait on R LE due to R foot drop   Impairments contributing to deviations: impaired motor control, impaired sensation, decreased strength, decreased balance        Endurance Deficit: Yes, at least mild     PT Evaluation Completed? Yes      CMS Impairment/Limitation/Restriction for FOTO Peripheral Nervous System Disorders/Injuries Survey    Therapist reviewed FOTO scores for Camila Adan on 8/4/2022.   FOTO documents entered into [employer] - see Media section.    Limitation Score: 60 %         TREATMENT     No treatment provided; evaluation only.     Home Exercises and Patient Education Provided    Education provided:   - Pt educated regarding evaluation findings, potential plan of care and scheduling process. Brief explanation of AFO and use of clinic model as an example of one which can be purchased by pt.      Written Home Exercises Provided: to be provided in a future session.      Assessment   Camila is a 51 y.o. female referred to outpatient Physical Therapy with a medical diagnosis of polyneuropathy. Pt presents to PT with the following impairments leading to his/her functional decline: decreased LE strength( R>L), decreased LE sensation, decreased balance, decreased endurance. Due to pt's diagnosis and resultant difficulty with lower body sensation, she has experienced several falls in the past 6 months. Fortunately, these have not caused significant injuries. With regard to today's objective testing, pt demonstrates R LE weakness with R foot drop. She scored 0/5 for ankle dorsiflexion and this is manifested in her steppage gait pattern. She required 22 seconds to complete the 5 x sit<> stand test~ this score places her at risk for falls. Her TUG  score of 30 seconds is very slow, and also indicative of fall risk. Her SSWS of 0.46 m/sec places her in the 60-79 % limited category with respect to safe, independent community ambulators. She scored poorly on the mCTSIB, passing only condition # 1( firm ground with eyes open). PT feels pt is in need of an appropriate R AFO to help prevent additional falls and will educate pt regarding the type he feels would be of most benefit. She also would benefit from RW instead of her current rollator, as the former is safer given her diagnosis and fall history. Pt is clearly appropriate for OPPT to address the above problem areas, especially considering pt's past fall history. PT recommends 2 x weekly visits to address pt's current limitations.    Pt prognosis is Fair.   Pt will benefit from skilled outpatient Physical Therapy to address the deficits stated above and in the chart below, provide pt/family education, and to maximize pt's level of independence.     Plan of care discussed with patient: Yes  Pt's spiritual, cultural and educational needs considered and patient is agreeable to the plan of care and goals as stated below:     Anticipated Barriers for therapy: pt does not drive, history of falls    Medical Necessity is demonstrated by the following  History  Co-morbidities and personal factors that may impact the plan of care Co-morbidities:   high BMI and transportation assistance required    Personal Factors:   education level     high   Examination  Body Structures and Functions, activity limitations and participation restrictions that may impact the plan of care Body Regions:   lower extremities    Body Systems:    gross symmetry  strength  balance  gait  transfers  transitions    Participation Restrictions:   none    Activity limitations:   Learning and applying knowledge  no deficits    General Tasks and Commands  no deficits    Communication  no deficits    Mobility  lifting and carrying  objects  walking  using transportation (bus, train, plane, car)  driving (bike, car, motorcycle)    Self care  looking after one's health    Domestic Life  shopping  cooking  doing house work (cleaning house, washing dishes, laundry)  assisting others    Interactions/Relationships  no deficits    Life Areas  employment    Community and Social Life  community life  recreation and leisure         high   Clinical Presentation evolving clinical presentation with changing clinical characteristics moderate   Decision Making/ Complexity Score: moderate     Goals:  Short Term Goals: 4 weeks   1. Pt will be issued first installment of HEP and report at least partial compliance  2. Pt will improve her 5 x sit<> stand test score to 20 seconds or < to demonstrate decreased fall risk  3. Pt will improve her TUG score to 27 seconds or < with RW for improved household ambulation with decreased fall risk  4. Pt will improve her SSWS to 0.50 m/sec with RW for improved community ambulation with decreased fall risk  5. Pt will improve her score on condition # 3 of the mCTSIB to 17 seconds for improved ability to balance on compliant surfaces    Long Term Goals: 8 weeks   1. Pt will report </= 49 % limitation using FOTO assessment tool in order to demonstrate improved perception of abilities.   2. Pt will improve her 5 x sit<> stand test score to 18 seconds or < to demonstrate decreased fall risk  3. Pt will improve her TUG score to 24 seconds or < with RW for improved household ambulation with decreased fall risk  4. Pt will improve her SSWS to 0.55 m/sec with RW for improved community ambulation with decreased fall risk  5. Pt will improve her score on condition # 3 of the mCTSIB to 20 seconds for improved ability to balance on compliant surfaces   6. Pt will be independent and compliant with finalized HEP to maintain potential gains realized in PT    Plan   Plan of care Certification: 8/4/2022 to 9/29/22.    Outpatient Physical  Therapy 2 times weekly for 8 weeks to include the following interventions: Gait Training, Neuromuscular Re-ed, Orthotic Management and Training, Patient Education, Therapeutic Activities and Therapeutic Exercise.     Stephon Strong, PT   8/4/2022

## 2022-08-11 ENCOUNTER — PATIENT OUTREACH (OUTPATIENT)
Dept: ADMINISTRATIVE | Facility: HOSPITAL | Age: 52
End: 2022-08-11
Payer: MEDICAID

## 2022-08-11 NOTE — PROGRESS NOTES
Health Maintenance Due   Topic Date Due    Cervical Cancer Screening  Never done    Mammogram  Never done    Colorectal Cancer Screening  Never done

## 2022-08-16 ENCOUNTER — OFFICE VISIT (OUTPATIENT)
Dept: HEPATOLOGY | Facility: CLINIC | Age: 52
End: 2022-08-16
Payer: MEDICAID

## 2022-08-16 VITALS
DIASTOLIC BLOOD PRESSURE: 82 MMHG | BODY MASS INDEX: 35.66 KG/M2 | HEART RATE: 110 BPM | WEIGHT: 221.88 LBS | TEMPERATURE: 98 F | HEIGHT: 66 IN | RESPIRATION RATE: 19 BRPM | OXYGEN SATURATION: 98 % | SYSTOLIC BLOOD PRESSURE: 136 MMHG

## 2022-08-16 DIAGNOSIS — R79.89 ELEVATED LIVER FUNCTION TESTS: Primary | ICD-10-CM

## 2022-08-16 DIAGNOSIS — K76.0 FATTY LIVER: ICD-10-CM

## 2022-08-16 PROCEDURE — 3075F SYST BP GE 130 - 139MM HG: CPT | Mod: CPTII,,, | Performed by: INTERNAL MEDICINE

## 2022-08-16 PROCEDURE — 1159F MED LIST DOCD IN RCRD: CPT | Mod: CPTII,,, | Performed by: INTERNAL MEDICINE

## 2022-08-16 PROCEDURE — 3008F BODY MASS INDEX DOCD: CPT | Mod: CPTII,,, | Performed by: INTERNAL MEDICINE

## 2022-08-16 PROCEDURE — 99203 PR OFFICE/OUTPT VISIT, NEW, LEVL III, 30-44 MIN: ICD-10-PCS | Mod: S$PBB,,, | Performed by: INTERNAL MEDICINE

## 2022-08-16 PROCEDURE — 3008F PR BODY MASS INDEX (BMI) DOCUMENTED: ICD-10-PCS | Mod: CPTII,,, | Performed by: INTERNAL MEDICINE

## 2022-08-16 PROCEDURE — 1159F PR MEDICATION LIST DOCUMENTED IN MEDICAL RECORD: ICD-10-PCS | Mod: CPTII,,, | Performed by: INTERNAL MEDICINE

## 2022-08-16 PROCEDURE — 99215 OFFICE O/P EST HI 40 MIN: CPT | Mod: PBBFAC,PN | Performed by: INTERNAL MEDICINE

## 2022-08-16 PROCEDURE — 99999 PR PBB SHADOW E&M-EST. PATIENT-LVL V: ICD-10-PCS | Mod: PBBFAC,,, | Performed by: INTERNAL MEDICINE

## 2022-08-16 PROCEDURE — 99999 PR PBB SHADOW E&M-EST. PATIENT-LVL V: CPT | Mod: PBBFAC,,, | Performed by: INTERNAL MEDICINE

## 2022-08-16 PROCEDURE — 99203 OFFICE O/P NEW LOW 30 MIN: CPT | Mod: S$PBB,,, | Performed by: INTERNAL MEDICINE

## 2022-08-16 PROCEDURE — 3075F PR MOST RECENT SYSTOLIC BLOOD PRESS GE 130-139MM HG: ICD-10-PCS | Mod: CPTII,,, | Performed by: INTERNAL MEDICINE

## 2022-08-16 PROCEDURE — 3079F PR MOST RECENT DIASTOLIC BLOOD PRESSURE 80-89 MM HG: ICD-10-PCS | Mod: CPTII,,, | Performed by: INTERNAL MEDICINE

## 2022-08-16 PROCEDURE — 3079F DIAST BP 80-89 MM HG: CPT | Mod: CPTII,,, | Performed by: INTERNAL MEDICINE

## 2022-08-16 NOTE — PROGRESS NOTES
HEPATOLOGY CONSULTATION    Referring Physician: Orestes Hancock MD  Current Corresponding Physician: Orestes Hancock MD    Reason for Consultation: Consultation for evaluation of Elevated Hepatic Enzymes    History of Present Illness: Camila Adan is a 51 y.o. female with a hx of polyneuropathy (severe-unclear etiology) has been noted to have elevated liver tests:     Labs 4/7/22: , , ALKP 89, Tbil 0.5, albumin 3.4  HBsAg-, HBcIgM-, HAV IgM-, HIV-, HCV Ab-, WESLY-  Covid+ 1/12/22  Alcohol: social  BMI: 35.8    Abdo US 8/19/21: Hepatomegaly with fatty change.    The patient denies any symptoms of decompensated cirrhosis, including no ascites or edema, cognitive problems that would suggest hepatic encephalopathy, or GI bleeding from varices. Does have a lot of trouble walking due to a polyneuropathy and is falling; has a drop foot-unclear etiology; neurology w/u pending.    Chief Complaint   Patient presents with    Elevated Hepatic Enzymes       Past Medical History:   Diagnosis Date    Allergy     Bronchitis      Outpatient Encounter Medications as of 8/16/2022   Medication Sig Dispense Refill    albuterol (PROVENTIL/VENTOLIN HFA) 90 mcg/actuation inhaler INHALE TWO PUFFS INTO THE LUNGS EVERY 4 HOURS AS NEEDED RESCUE 54 g 3    alendronate (FOSAMAX) 70 MG tablet Take 1 tablet (70 mg total) by mouth every 7 days. 4 tablet 11    cyanocobalamin 1,000 mcg/mL injection 1 cc IM q.week times 10 then monthly   Needs 100 cc viual   Needs #10 25 gauage 5/8 inch needled  Needs #10 3 cc syringes   Give shot and deltoid area alternate arms 10 mL 5    cyclobenzaprine (FLEXERIL) 10 MG tablet TAKE ONE TABLET BY MOUTH THREE TIMES DAILY AS NEEDED 30 tablet 5    diphenoxylate-atropine 2.5-0.025 mg (LOMOTIL) 2.5-0.025 mg per tablet One after each loose bowel movement up to 4 per day 30 tablet 1    ergocalciferol (ERGOCALCIFEROL) 50,000 unit Cap TAKE ONE CAPSULE BY MOUTH every SEVEN DAYS 12 capsule 3     furosemide (LASIX) 20 MG tablet Take 1 tablet (20 mg total) by mouth every morning. 30 tablet 2    gabapentin (NEURONTIN) 600 MG tablet Take 1 tablet (600 mg total) by mouth 3 (three) times daily. 90 tablet 2    meloxicam (MOBIC) 7.5 MG tablet Take 1 tablet (7.5 mg total) by mouth 2 (two) times daily as needed for Pain. 60 tablet 5    ondansetron (ZOFRAN-ODT) 4 MG TbDL Take 1 tablet (4 mg total) by mouth every 6 (six) hours as needed (nausea). 15 tablet 0    potassium chloride (MICRO-K) 10 MEQ CpSR TAKE ONE CAPSULE BY MOUTH ONCE DAILY FOR ONE DOSE 90 capsule 1    promethazine-codeine 6.25-10 mg/5 ml (PHENERGAN WITH CODEINE) 6.25-10 mg/5 mL syrup Take 5 mLs by mouth every 6 (six) hours as needed for Cough. 180 mL 0     No facility-administered encounter medications on file as of 8/16/2022.     Review of patient's allergies indicates:  No Known Allergies  History reviewed. No pertinent family history.    Social History     Socioeconomic History    Marital status: Single   Tobacco Use    Smoking status: Current Every Day Smoker     Packs/day: 0.50     Years: 12.00     Pack years: 6.00     Types: Cigarettes    Smokeless tobacco: Never Used   Substance and Sexual Activity    Alcohol use: No     Comment: Socially    Drug use: No    Sexual activity: Yes     Partners: Male     Review of Systems   Constitutional: Negative.    HENT: Negative.    Eyes: Negative.    Respiratory: Negative.    Cardiovascular: Negative.    Gastrointestinal: Negative.    Genitourinary: Negative.    Musculoskeletal: Negative.    Skin: Negative.    Neurological: Negative.    Psychiatric/Behavioral: Negative.      Vitals:    08/16/22 1305   BP: 136/82   Pulse: 110   Resp: 19   Temp: 97.8 °F (36.6 °C)       Physical Exam  Vitals reviewed.   Constitutional:       Appearance: She is well-developed.   HENT:      Head: Normocephalic and atraumatic.   Eyes:      General: No scleral icterus.     Conjunctiva/sclera: Conjunctivae normal.       Pupils: Pupils are equal, round, and reactive to light.   Neck:      Thyroid: No thyromegaly.   Cardiovascular:      Rate and Rhythm: Normal rate and regular rhythm.      Heart sounds: Normal heart sounds.   Pulmonary:      Effort: Pulmonary effort is normal.      Breath sounds: Normal breath sounds. No rales.   Abdominal:      General: Bowel sounds are normal. There is no distension.      Palpations: Abdomen is soft. There is no mass.      Tenderness: There is no abdominal tenderness.   Musculoskeletal:         General: Normal range of motion.      Cervical back: Normal range of motion and neck supple.   Skin:     General: Skin is warm and dry.      Findings: No rash.   Neurological:      Mental Status: She is alert and oriented to person, place, and time.             Lab Results   Component Value Date     04/07/2022    BUN 17 04/07/2022    CREATININE 0.7 04/07/2022    CALCIUM 9.7 04/07/2022     (L) 04/07/2022    K 4.1 04/07/2022     04/07/2022    PROT 7.6 04/07/2022    CO2 20 (L) 04/07/2022    ANIONGAP 14 04/07/2022    WBC 11.70 04/07/2022    RBC 3.77 (L) 04/07/2022    HGB 14.3 04/07/2022    HCT 42.2 04/07/2022     (H) 04/07/2022    MCH 37.9 (H) 04/07/2022    MCHC 33.9 04/07/2022     Lab Results   Component Value Date    RDW 15.0 (H) 04/07/2022     04/07/2022    MPV 9.3 04/07/2022    GRAN 9.0 (H) 04/07/2022    GRAN 77.0 (H) 04/07/2022    LYMPH 2.0 04/07/2022    LYMPH 16.7 (L) 04/07/2022    MONO 0.5 04/07/2022    MONO 4.5 04/07/2022    EOSINOPHIL 1.0 04/07/2022    BASOPHIL 0.5 04/07/2022    EOS 0.1 04/07/2022    BASO 0.06 04/07/2022    CHOL 215 (H) 05/11/2021    TRIG 130 05/11/2021    HDL 49 05/11/2021    CHOLHDL 22.8 05/11/2021    TOTALCHOLEST 4.4 05/11/2021    ALBUMIN 3.4 (L) 04/07/2022     (H) 04/07/2022     (H) 04/07/2022    ALKPHOS 89 04/07/2022    MG 1.8 04/22/2021       Assessment and Plan:  Camila Adan is a 51 y.o. female with Elevated Hepatic Enzymes  Kate  US shows a fatty liver. HBsAg and HCV ab were negative. WESLY was also negative. Fatty liver is the most likely etiology. Her elevated liver tests would not be related to her neuropathy. I am recommending that a serologic w/u be completed for elevated liver tests and that she repeat an abdo US. Since her BMI is >35, I am recommending an MRE to screen for fibrosis.     Return 6-8 weeks

## 2022-08-24 ENCOUNTER — TELEPHONE (OUTPATIENT)
Dept: HEPATOLOGY | Facility: CLINIC | Age: 52
End: 2022-08-24
Payer: MEDICAID

## 2022-08-24 NOTE — TELEPHONE ENCOUNTER
------ Message from Dr Renee Pantoja -----  Please call and  pt to stop drinking. Peth very high.  Liver tests are improved. You don't have alpha 1 antitrypsin deficiency. No excess iron. Rest of Labs are pending.  AMA - an autoimmune marker is negative.  No gluten allergy.  You ar not immune to hepatitis A or B. Consider getting vaccinated for both viruses through your PCP.    Patient called at home. Stated she was on a heating pad. I fell and hurt my back.   Message relayed from Dr Pantoja. Pt stated she is going to see Dr Freeman next week next and ask them.  Patient verbalized understanding.

## 2022-08-24 NOTE — TELEPHONE ENCOUNTER
----- Message from Renee Pantoja MD sent at 8/22/2022  4:15 PM CDT -----  Evangelina, please call and  pt to stop drinking. Peth very high

## 2022-08-31 NOTE — PROGRESS NOTES
[facility]   Physical Therapy Treatment Note     Name: Camila Adan  Clinic Number: [id number]    Therapy Diagnosis:   Encounter Diagnoses   Name Primary?    Impaired functional mobility, balance, gait, and endurance Yes    Foot drop, right     Right leg weakness      Physician: Orestes Hancock MD    Visit Date: 9/1/2022    Physician Orders:  PT eval and treat  Medical Diagnosis from Referral: polyneuropathy  Evaluation Date: 8/4/2022  Authorization Period Expiration: 12/31/22  Plan of Care Expiration: 9/29/22  Visit # / Visits authorized: 1/ 25(+eval)    PTA Visit #: 0/5     Time In: 1324( PT started session late as  did not check pt in upon her arrival).  Time Out: 1408  Total Billable Time: 44 minutes    SUBJECTIVE     Pt reports: she hasn't been able to come to her recent visits due to transportation issues. She also states she fell about a week and a half ago when she was getting up from the toilet. This resulted in an injury to her R ribs. However, pt denies pain here today.  She was issued first installment of her home exercise program today.  Response to previous treatment: no adverse effects from evaluation  Functional change: ongoing    Pain: 0/10  Location: N/A       OBJECTIVE     Objective Measures updated at progress report unless specified.     Vitals at start of session:  Sp02 93% and HR 66   /94    Treatment     Camila received the treatments listed below:      therapeutic exercises to develop strength, endurance, ROM, flexibility, posture, and core stabilization for 44 minutes including:    X 6 minutes on SCI-FIT recumbent stepper, level 1.0, for B UE and LE muscular and CV endurance  Sp02 96% and      Pt instructed in and performs the following exercises as first installment of HEP:    2 x 10 standing B hip flexion, B UE support on counter  2 x 10 standing B knee flexion   2 x 10 sit<> stand transitions from standard chair~rollator placed  anterior to pt  Sp02 96% and     Pt requires frequent seated rest periods throughout due to shortness of breath.    Vitals at end of session:   169/95,     PT escorts pt to and from lobby with use of rollator, CGA/SBA    neuromuscular re-education activities to improve: Balance, Coordination, Kinesthetic, Sense, Proprioception, and Posture for 0 minutes. The following activities were included:    therapeutic activities to improve functional performance for 0  minutes, including:      gait training to improve functional mobility and safety for 0  minutes, including:            Patient Education and Home Exercises     Home Exercises Provided and Patient Education Provided     Education provided:   - 9/1/22: pt provided with first installment of her HEP. PT also educated pt regarding the fact that he would like to ask MD for an order for R LE carbon fiber AFO. Pt also is requesting a prescription for a RW( which would be safer for her versus her current rollator).    Written Home Exercises Provided: yes. Exercises were reviewed and Camila was able to demonstrate them prior to the end of the session.  Camila demonstrated good  understanding of the education provided. See EMR under Patient Instructions for exercises provided during therapy sessions    ASSESSMENT     Camila tolerated her first follow-up session fairly. This is mostly due to her need for frequent rest breaks due to shortness of breath. Pt also demonstrated elevated blood pressure at start of session; pressure came down slightly by the end of the treatment. PT started session late as he was unaware that pt had arrived(  did not check pt in upon arrival). However, he was able to make up the lost time. Pt was instructed in first installment of HEP. She performed the exercises well, but needed frequent seated rest breaks due to fatigue. Pt also began endurance training on the SCI-FIT recumbent stepper without too much difficulty. PT will  message her PCP( who she sees soon) and request orders for RW and R carbon fiber AFO. PT is hopeful that pt can regularly attend sessions, as this is her first visit to clinic in nearly a month. Camila remains appropriate for 2 x weekly PT visits to address her current deficits.    Camila Is progressing well towards her goals.   Pt prognosis is Fair.     Pt will continue to benefit from skilled outpatient physical therapy to address the deficits listed in the problem list box on initial evaluation, provide pt/family education and to maximize pt's level of independence in the home and community environment.     Pt's spiritual, cultural and educational needs considered and pt agreeable to plan of care and goals.     Anticipated barriers to physical therapy: pt does not drive, history of falls    Goals:     Short Term Goals: 4 weeks   Pt will be issued first installment of HEP and report at least partial compliance~in progress  Pt will improve her 5 x sit<> stand test score to 20 seconds or < to demonstrate decreased fall risk~in progress  Pt will improve her TUG score to 27 seconds or < with RW for improved household ambulation with decreased fall risk~in progress  Pt will improve her SSWS to 0.50 m/sec with RW for improved community ambulation with decreased fall risk~in progress  Pt will improve her score on condition # 3 of the mCTSIB to 17 seconds for improved ability to balance on compliant surfaces~in progress     Long Term Goals: 8 weeks   Pt will report </= 49 % limitation using FOTO assessment tool in order to demonstrate improved perception of abilities~ongoing   Pt will improve her 5 x sit<> stand test score to 18 seconds or < to demonstrate decreased fall risk~ongoing   Pt will improve her TUG score to 24 seconds or < with RW for improved household ambulation with decreased fall risk~ongoing   Pt will improve her SSWS to 0.55 m/sec with RW for improved community ambulation with decreased fall risk~ongoing    Pt will improve her score on condition # 3 of the mCTSIB to 20 seconds for improved ability to balance on compliant surfaces ~ongoing   Pt will be independent and compliant with finalized HEP to maintain potential gains realized in PT~ongoing           PLAN     Continue to monitor pt's vitals at each treatment session; progress strengthening, endurance and balance activities to pt tolerance.    Stephon Strong, PT    9/1/2022

## 2022-09-01 ENCOUNTER — CLINICAL SUPPORT (OUTPATIENT)
Dept: REHABILITATION | Facility: HOSPITAL | Age: 52
End: 2022-09-01
Attending: FAMILY MEDICINE
Payer: MEDICAID

## 2022-09-01 DIAGNOSIS — Z74.09 IMPAIRED FUNCTIONAL MOBILITY, BALANCE, GAIT, AND ENDURANCE: Primary | ICD-10-CM

## 2022-09-01 DIAGNOSIS — R29.898 RIGHT LEG WEAKNESS: ICD-10-CM

## 2022-09-01 DIAGNOSIS — M21.371 FOOT DROP, RIGHT: ICD-10-CM

## 2022-09-01 PROCEDURE — 97110 THERAPEUTIC EXERCISES: CPT | Mod: PO

## 2022-09-12 ENCOUNTER — TELEPHONE (OUTPATIENT)
Dept: HEPATOLOGY | Facility: CLINIC | Age: 52
End: 2022-09-12
Payer: MEDICAID

## 2022-09-13 ENCOUNTER — CLINICAL SUPPORT (OUTPATIENT)
Dept: REHABILITATION | Facility: HOSPITAL | Age: 52
End: 2022-09-13
Payer: MEDICAID

## 2022-09-13 DIAGNOSIS — Z74.09 IMPAIRED FUNCTIONAL MOBILITY, BALANCE, GAIT, AND ENDURANCE: Primary | ICD-10-CM

## 2022-09-13 DIAGNOSIS — R29.898 RIGHT LEG WEAKNESS: ICD-10-CM

## 2022-09-13 DIAGNOSIS — M21.371 FOOT DROP, RIGHT: ICD-10-CM

## 2022-09-13 PROCEDURE — 97110 THERAPEUTIC EXERCISES: CPT | Mod: PO

## 2022-09-13 NOTE — PROGRESS NOTES
"[facility]   Physical Therapy Treatment Note     Name: Camila Adan  Clinic Number: [id number]    Therapy Diagnosis:   Encounter Diagnoses   Name Primary?    Impaired functional mobility, balance, gait, and endurance Yes    Foot drop, right     Right leg weakness        Physician: Orestes Hancock MD    Visit Date: 9/13/2022    Physician Orders:  PT eval and treat  Medical Diagnosis from Referral: polyneuropathy  Evaluation Date: 8/4/2022  Authorization Period Expiration: 12/31/22  Plan of Care Expiration: 9/29/22  Visit # / Visits authorized: 2/ 25(+eval)    PTA Visit #: 0/5     Time In: 1400   Time Out: 1445   Total Billable Time: 45  minutes    SUBJECTIVE     Pt reports: she is not doing well today. " My legs feel like they weigh 100 pounds." She missed her previous appointment with her PCP due to a transportation issue.   She was partially compliant with home exercise program.  Response to previous treatment: no adverse effects   Functional change: ongoing    Pain: 0/10  Location: N/A       OBJECTIVE   PT provides CGA for safety while pt ambulates from lobby to gym with her rollator~ she pauses several times to rest due to shortness of breath. PT tech provides CGA while pt ambulates out to lobby at end of session.      Vitals at start of session:  Sp02 90% and ~ after ambulating from lobby to gym  /106~ automatic reading, R UE  /76~ manual reading, R UE        Objective Measures updated at progress report unless specified. See below:      Lower Extremity Strength~ tested in sitting  Right LE eval  9/13/22 Left LE eval  9/13/22   Hip flexion:  4-/5 3-/5 Hip flexion: 4-/5 3-/5   Knee extension: 5/5 5/5 Knee extension: 5/5 5/5   Knee flexion: 4+/5 4/5 Knee flexion: 4+/5 4+/5   Ankle dorsiflexion:  0/5 0/5 Ankle dorsiflexion: 4/5 4-/5   Ankle plantarflexion:  4/5 4+/5 Ankle plantarflexion: 5/5 4+/5   Hip abduction: 4+/5 5/5 Hip abduction: 5/5 5/5   Hip adduction: " 4+/5 5/5 Hip adduction 5/5 5/5   Hip extension: 5/5 5/5 Hip extension: 5/5 5/5           Evaluation 9/13/22   Single Limb Stance R LE NT  (<10 sec = HIGH FALL RISK) NT  (<10 sec = HIGH FALL RISK)   Single Limb Stance L LE NT  (<10 sec = HIGH FALL RISK) NT  (<10 sec = HIGH FALL RISK)   30 second Chair Rise NT NT   5 times sit to stand 22 seconds~ slightly short of full knee extension 13 seconds~ slightly short of full knee extension   TUG 30 seconds with RW, SBA 24 seconds with rollator, CGA   Self selected walking speed 0.46 m/sec (6m/13s) with RW, SBA 0.4 m/sec (6m/15s) with rollator, CGA   Fast walking speed NT NT      5 x sit<> stand > 12 seconds indicates fall risk    Postural control:  MCTSIB:  1. Eyes Open/feet together/Firm: 30 seconds, P  2. Eyes Closed/feet together/Firm: 3 seconds, F  3. Eyes Open/feet together/Foam: 14 seconds, F  4. Eyes Closed/feet together/Foam: 2 seconds, F    9/13/22  Postural control:  MCTSIB:  1. Eyes Open/feet together/Firm: 30 seconds, P  2. Eyes Closed/feet together/Firm: 3 seconds, F  3. Eyes Open/feet together/Foam: 30 seconds, P  4. Eyes Closed/feet together/Foam: 2 seconds, F        Treatment     Camila received the treatments listed below:      therapeutic exercises to develop strength, endurance, ROM, flexibility, posture, and core stabilization for 45 minutes including:    X 7 minutes on SCI-FIT recumbent stepper, level 1.0, for B UE and LE muscular and CV endurance  Sp02 95% and          Pt requires frequent seated rest periods throughout due to shortness of breath.        neuromuscular re-education activities to improve: Balance, Coordination, Kinesthetic, Sense, Proprioception, and Posture for 0 minutes. The following activities were included:    therapeutic activities to improve functional performance for 0  minutes, including:      gait training to improve functional mobility and safety for 0  minutes, including:            Patient Education and Home Exercises      Home Exercises Provided and Patient Education Provided     Education provided:   - 9/1/22: pt provided with first installment of her HEP. PT also educated pt regarding the fact that he would like to ask MD for an order for R LE carbon fiber AFO. Pt also is requesting a prescription for a RW( which would be safer for her versus her current rollator).  -9/13/22: PT educated pt in simple diaphragmatic breathing technique.    Written Home Exercises Provided: yes. Exercises were reviewed and Camila was able to demonstrate them prior to the end of the session.  Camila demonstrated good  understanding of the education provided. See EMR under Patient Instructions for exercises provided during therapy sessions    ASSESSMENT     Camila tolerated her treatment session fairly well for her 30 day reassessment( once she composed herself after ambulating from [employer] to gym). PT is having to perform this even though pt has only attended 2 follow-up sessions since evaluation. Some sessions were missed due to transportation issues. PT continues to monitor vitals and pt's diastolic BP was considerably elevated via automatic cuff. PT took manually and got a much different value for diastolic BP, so he proceeded with session. Pt's activity tolerance for ambulation remains poor, as she struggles to walk from [employer] to gym and vice-versa( about 150 feet each way). With regard to pt's test results, she demonstrated several improvements. Her R LE MMT scores for ankle plantarflexion, hip abduction and hip adduction all increased. Her scores for 5 x sit<> stand and TUG demonstrated significant improvements. She also passed condition # 3 of the MCTSIB. PT's main concerns moving forward are pt's ability to consistently attend visits and the fact that she is very unhealthy for someone so young. Camila remains appropriate for 2 x weekly PT visits to address her current deficits.    Camila Is progressing well towards her goals.   Pt prognosis is  Fair.     Pt will continue to benefit from skilled outpatient physical therapy to address the deficits listed in the problem list box on initial evaluation, provide pt/family education and to maximize pt's level of independence in the home and community environment.     Pt's spiritual, cultural and educational needs considered and pt agreeable to plan of care and goals.     Anticipated barriers to physical therapy: pt does not drive, history of falls    Goals:     Short Term Goals: 4 weeks   Pt will be issued first installment of HEP and report at least partial compliance~MET, 9/13/22  Pt will improve her 5 x sit<> stand test score to 20 seconds or < to demonstrate decreased fall risk~MET, 9/13/22  Pt will improve her TUG score to 27 seconds or < with RW for improved household ambulation with decreased fall risk~MET, 9/13/22  Pt will improve her SSWS to 0.50 m/sec with RW for improved community ambulation with decreased fall risk~in progress  Pt will improve her score on condition # 3 of the mCTSIB to 17 seconds for improved ability to balance on compliant surfaces~MET, 9/13/22     Long Term Goals: 8 weeks   Pt will report </= 49 % limitation using FOTO assessment tool in order to demonstrate improved perception of abilities~ongoing, will assess at next visit on 9/15/22  Pt will improve her 5 x sit<> stand test score to 18 seconds or < to demonstrate decreased fall risk~ MET, 9/13/22  Pt will improve her TUG score to 24 seconds or < with RW for improved household ambulation with decreased fall risk~MET, 9/13/22~ Revise to 22 seconds or <  Pt will improve her SSWS to 0.55 m/sec with RW for improved community ambulation with decreased fall risk~ongoing   Pt will improve her score on condition # 3 of the mCTSIB to 20 seconds for improved ability to balance on compliant surfaces ~MET, 9/13/22  Pt will be independent and compliant with finalized HEP to maintain potential gains realized in PT~ongoing           PLAN      Continue to monitor pt's vitals at each treatment session; progress strengthening, endurance and balance activities to pt tolerance.    Next visit: provide FOTO survey for 2nd intake.    Stephon Strong, PT    9/13/2022

## 2022-09-14 ENCOUNTER — TELEPHONE (OUTPATIENT)
Dept: HEPATOLOGY | Facility: CLINIC | Age: 52
End: 2022-09-14
Payer: MEDICAID

## 2022-09-14 ENCOUNTER — TELEPHONE (OUTPATIENT)
Dept: PRIMARY CARE CLINIC | Facility: CLINIC | Age: 52
End: 2022-09-14
Payer: MEDICAID

## 2022-09-14 NOTE — TELEPHONE ENCOUNTER
Call to pt to try and reschedule her ultrasound again the mailbox is full    Call to relative on file, no answer. Left a voice message for Ms. Jensen to call back about getting rescheduled for her ultrasound

## 2022-09-14 NOTE — TELEPHONE ENCOUNTER
----- Message from Ernestina Aguirre MA sent at 9/13/2022  4:58 PM CDT -----  Contact: Pt Mobile [phone]    ----- Message -----  From: Hayley Guevara  Sent: 9/13/2022   3:21 PM CDT  To: [facility] Staff    No blue slot available to schedule an appointment for the patient.  Patient is established with which PCP: Dr. Orestes Hancock  Reason for the visit: 6 week follow up.  Would the patient like a call back, or a response through their [facility] portal?:  Call  Comment: Patient would like to be seen on a Wednesday please.

## 2022-09-15 ENCOUNTER — DOCUMENTATION ONLY (OUTPATIENT)
Dept: REHABILITATION | Facility: HOSPITAL | Age: 52
End: 2022-09-15
Payer: MEDICAID

## 2022-09-15 NOTE — PROGRESS NOTES
Camila did not attend her 2:30 PM physical therapy session today. No charges posted.    Stephon Strong, PT  9/15/2022

## 2022-09-27 ENCOUNTER — PATIENT MESSAGE (OUTPATIENT)
Dept: PRIMARY CARE CLINIC | Facility: CLINIC | Age: 52
End: 2022-09-27
Payer: MEDICAID

## 2022-09-29 ENCOUNTER — TELEPHONE (OUTPATIENT)
Dept: REHABILITATION | Facility: HOSPITAL | Age: 52
End: 2022-09-29

## 2022-09-29 NOTE — TELEPHONE ENCOUNTER
PT called pt today to see why she has missed several PT sessions lately. Pt reported she has been sick the past couple of weeks with a cough. She also states that she tries to cancel sessions when she cannot attend, but for some reason, today's session was not cancelled. PT informed pt of the fact that she has no future visits scheduled and the current plan of care is now . PT offered to schedule one future visit to reassess pt and see if she wishes to continue her therapy. Pt indicates she can come only on , so PT offered her a session on 10/6/22. Pt stated that she will attend that appointment.    Stephon Strong, PT  2022

## 2022-10-05 NOTE — PROGRESS NOTES
[facility]   Physical Therapy Treatment Note     Name: Camila Adan  Clinic Number: [id number]    Therapy Diagnosis:   Encounter Diagnoses   Name Primary?    Impaired functional mobility, balance, gait, and endurance Yes    Foot drop, right     Right leg weakness          Physician: Orestes Hancock MD    Visit Date: 10/6/2022    Physician Orders:  PT eval and treat  Medical Diagnosis from Referral: polyneuropathy  Evaluation Date: 8/4/2022  Authorization Period Expiration: 12/31/22  Plan of Care Expiration: 9/29/22  Updated Plan of Care Expiration: 12/1/22  Visit # / Visits authorized: 3/ 25(+eval)    PTA Visit #: 0/5     Time In: 1515    Time Out: 1600   Total Billable Time: 45   minutes    SUBJECTIVE     Pt reports: she is almost back to her normal state of health after having a considerable cough, bronchitis and a sinus infection. Pt also endorses some pain to B hands at night.   She was partially compliant with home exercise program.  Response to previous treatment: no adverse effects   Functional change: ongoing    Pain: 0/10  Location: N/A       OBJECTIVE   PT provides SBA for safety while pt ambulates from lobby to gym with her rollator~ pt able to ambulate entire distance to gym without standing rest break. Pt continues to demonstrate steppage gait with R LE due to R foot drop.    Vitals at start of session:  Sp02 86% and ~ after ambulating from lobby to gym~ SpO2 rises to 92 % in about 30-40 seconds.  /113~ automatic reading, R UE  /90~ manual reading, R UE        Objective Measures updated at progress report unless specified. See below:      Lower Extremity Strength~ tested in sitting*  Right LE eval  9/13/22 10/6/22 Left LE eval  9/13/22 10/6/22   Hip flexion:  4-/5 3-/5 4-/5 Hip flexion: 4-/5 3-/5 4-/5   Knee extension: 5/5 5/5 5/5 Knee extension: 5/5 5/5 5/5   Knee flexion: 4+/5 4/5 4+/5 Knee flexion: 4+/5 4+/5 5/5   Ankle dorsiflexion:  0/5 0/5 0/5  Ankle dorsiflexion: 4/5 4-/5 4+/5   Ankle plantarflexion:  4/5 4+/5 4+/5 Ankle plantarflexion: 5/5 4+/5 4+/5   Hip abduction: 4+/5 5/5 5/5 Hip abduction: 5/5 5/5 5/5   Hip adduction: 4+/5 5/5 5/5 Hip adduction 5/5 5/5 5/5   Hip extension: 5/5 5/5 5/5 Hip extension: 5/5 5/5 5/5    *some difficulty with sustaining contractions and coordination of movement          Evaluation 9/13/22 10/6/22   Single Limb Stance R LE NT  (<10 sec = HIGH FALL RISK) NT  (<10 sec = HIGH FALL RISK) NT  (<10 sec = HIGH FALL RISK)   Single Limb Stance L LE NT  (<10 sec = HIGH FALL RISK) NT  (<10 sec = HIGH FALL RISK) NT  (<10 sec = HIGH FALL RISK)   30 second Chair Rise NT NT NT   5 times sit to stand 22 seconds~ slightly short of full knee extension 13 seconds~ slightly short of full knee extension 15 seconds(average of 2 trials)~ slightly short of full knee extension   TUG 30 seconds with RW, SBA 24 seconds with rollator, CGA 19 seconds with rollator, SBA   Self selected walking speed 0.46 m/sec (6m/13s) with RW, SBA 0.4 m/sec (6m/15s) with rollator, CGA 0.5 m/sec (6m/12s) with rollator, SBA   Fast walking speed NT NT NT      5 x sit<> stand > 12 seconds indicates fall risk     Postural control:  MCTSIB:  1. Eyes Open/feet together/Firm: 30 seconds, P  2. Eyes Closed/feet together/Firm: 3 seconds, F  3. Eyes Open/feet together/Foam: 14 seconds, F  4. Eyes Closed/feet together/Foam: 2 seconds, F     9/13/22  Postural control:  MCTSIB:  1. Eyes Open/feet together/Firm: 30 seconds, P  2. Eyes Closed/feet together/Firm: 3 seconds, F  3. Eyes Open/feet together/Foam: 30 seconds, P  4. Eyes Closed/feet together/Foam: 2 seconds, F     10/6/22  Postural control:  MCTSIB:  1. Eyes Open/feet together/Firm: 30 seconds, P  2. Eyes Closed/feet together/Firm: 3 seconds, F  3. Eyes Open/feet together/Foam: 3 seconds, F  4. Eyes Closed/feet together/Foam: NT      CMS Impairment/Limitation/Restriction for FOTO Peripheral Nervous System Disorders/Injuries  Survey  Status Limitation G-Code CMS Severity Modifier  Intake 40% 60%  Predicted 51% 49% Goal Status+ CK - At least 40 percent but less than 60 percent  10/6/2022 36% 64% Current Status CL - At least 60 percent but less than 80 percent  D/C Status CL **only report if this is discharge survey        Treatment     Camila received the treatments listed below:      therapeutic exercises to develop strength, endurance, ROM, flexibility, posture, and core stabilization for 45 minutes including:  (Includes above tests and measures)    X 5 minutes on SCI-FIT recumbent stepper, level 2.0, for B UE and LE muscular and CV endurance  Sp02 93% and          neuromuscular re-education activities to improve: Balance, Coordination, Kinesthetic, Sense, Proprioception, and Posture for 0 minutes. The following activities were included:    therapeutic activities to improve functional performance for 0  minutes, including:      gait training to improve functional mobility and safety for 0  minutes, including:            Patient Education and Home Exercises     Home Exercises Provided and Patient Education Provided     Education provided:   - 9/1/22: pt provided with first installment of her HEP. PT also educated pt regarding the fact that he would like to ask MD for an order for R LE carbon fiber AFO. Pt also is requesting a prescription for a RW( which would be safer for her versus her current rollator).  -9/13/22: PT educated pt in simple diaphragmatic breathing technique.  -10/6/22: PT provided a schedule slip for the next 8 weeks, at 1 x weekly frequency. PT also requested she talk with her PCP regarding PT prior request for R carbon fiber AFO and RW orders.    Written Home Exercises Provided: yes. Exercises were reviewed and Camila was able to demonstrate them prior to the end of the session.  Camila demonstrated good  understanding of the education provided. See EMR under Patient Instructions for exercises provided during  therapy sessions    ASSESSMENT       Camila tolerated her treatment session fairly well for her updated plan of care reassessment. Due to transportation issues and recent illness, pt has only attended 3 follow-up sessions since her evaluation on 8/4/22. PT continues to monitor vitals and pt's BP was considerably elevated via automatic cuff. PT took manually and got a much different value for diastolic BP, so he proceeded with session. Pt's activity tolerance for ambulation remains limited, though she was able to travel from lobby to gym and vice-versa without a standing rest break. With regard to pt's test results, she demonstrated several improvements, which were a bit surprising( considering she hasn't attended many appointments). Scattered improvements noted for B LE MMT and her scores for TUG and SSWS both improved. Unfortunately, she was unable to pass condition # 3 of the MCTSIB, which caused her to cry briefly. PT's main concerns moving forward are pt's ability to consistently attend visits and the fact that she is very unhealthy for someone so young. Camila remains appropriate for continued visits but will drop frequency to 1 x weekly per her request. PT would like to extend the current plan of care for another 8 weeks( until 12/1/22). See below for most recent comments regarding goal achievement and any revisions made.          Camila Is progressing well towards her goals.   Pt prognosis is Fair.     Pt will continue to benefit from skilled outpatient physical therapy to address the deficits listed in the problem list box on initial evaluation, provide pt/family education and to maximize pt's level of independence in the home and community environment.     Pt's spiritual, cultural and educational needs considered and pt agreeable to plan of care and goals.     Anticipated barriers to physical therapy: pt does not drive, history of falls    Goals:     Short Term Goals: 4 weeks   Pt will be issued first  installment of HEP and report at least partial compliance~MET, 9/13/22  Pt will improve her 5 x sit<> stand test score to 20 seconds or < to demonstrate decreased fall risk~MET, 9/13/22  Pt will improve her TUG score to 27 seconds or < with RW for improved household ambulation with decreased fall risk~MET, 9/13/22  Pt will improve her SSWS to 0.50 m/sec with RW for improved community ambulation with decreased fall risk~MET, 10/6/22  Pt will improve her score on condition # 3 of the mCTSIB to 17 seconds for improved ability to balance on compliant surfaces~MET, 9/13/22, NOT MET, 10/6/22     Long Term Goals: 8 weeks   Pt will report </= 49 % limitation using FOTO assessment tool in order to demonstrate improved perception of abilities~ongoing, will assess at next visit on 9/15/22, NOT MET, 10/6/22  Pt will improve her 5 x sit<> stand test score to 18 seconds or < to demonstrate decreased fall risk~ MET, 9/13/22  Pt will improve her TUG score to 24 seconds or < with RW for improved household ambulation with decreased fall risk~MET, 9/13/22~ Revise to 22 seconds or <, MET, 10/6/22~ Revise again to 18 seconds or <  Pt will improve her SSWS to 0.55 m/sec with RW for improved community ambulation with decreased fall risk~ongoing   Pt will improve her score on condition # 3 of the mCTSIB to 20 seconds for improved ability to balance on compliant surfaces ~MET, 9/13/22, NOT MET, 10/6/22  Pt will be independent and compliant with finalized HEP to maintain potential gains realized in PT~ongoing           PLAN       Continue outpatient physical therapy 1 x weekly under updated Plan of Care, 10/6/22 to 12/1/22, with treatment to include: pt education, HEP, therapeutic exercises, neuromuscular re-education/balance exercises, therapeutic activities, joint mobilizations, and modalities PRN, to work towards established goals. Pt may be seen by PTA to carry out plan of care.             Continue to monitor pt's vitals at each  treatment session; progress strengthening, endurance and balance activities to pt tolerance.      Stephon Strong, PT    10/6/2022

## 2022-10-06 ENCOUNTER — CLINICAL SUPPORT (OUTPATIENT)
Dept: REHABILITATION | Facility: HOSPITAL | Age: 52
End: 2022-10-06
Attending: FAMILY MEDICINE
Payer: MEDICAID

## 2022-10-06 DIAGNOSIS — R29.898 RIGHT LEG WEAKNESS: ICD-10-CM

## 2022-10-06 DIAGNOSIS — M21.371 FOOT DROP, RIGHT: ICD-10-CM

## 2022-10-06 DIAGNOSIS — Z74.09 IMPAIRED FUNCTIONAL MOBILITY, BALANCE, GAIT, AND ENDURANCE: Primary | ICD-10-CM

## 2022-10-06 PROCEDURE — 97110 THERAPEUTIC EXERCISES: CPT | Mod: PO

## 2022-10-06 NOTE — PLAN OF CARE
[facility]   Physical Therapy Treatment Note     Name: Camila Adan  Clinic Number: [id number]    Therapy Diagnosis:   Encounter Diagnoses   Name Primary?    Impaired functional mobility, balance, gait, and endurance Yes    Foot drop, right     Right leg weakness          Physician: Orestes Hancock MD    Visit Date: 10/6/2022    Physician Orders:  PT eval and treat  Medical Diagnosis from Referral: polyneuropathy  Evaluation Date: 8/4/2022  Authorization Period Expiration: 12/31/22  Plan of Care Expiration: 9/29/22  Updated Plan of Care Expiration: 12/1/22  Visit # / Visits authorized: 3/ 25(+eval)    PTA Visit #: 0/5     Time In: 1515    Time Out: 1600   Total Billable Time: 45   minutes    SUBJECTIVE     Pt reports: she is almost back to her normal state of health after having a considerable cough, bronchitis and a sinus infection. Pt also endorses some pain to B hands at night.   She was partially compliant with home exercise program.  Response to previous treatment: no adverse effects   Functional change: ongoing    Pain: 0/10  Location: N/A       OBJECTIVE   PT provides SBA for safety while pt ambulates from lobby to gym with her rollator~ pt able to ambulate entire distance to gym without standing rest break. Pt continues to demonstrate steppage gait with R LE due to R foot drop.    Vitals at start of session:  Sp02 86% and ~ after ambulating from lobby to gym~ SpO2 rises to 92 % in about 30-40 seconds.  /113~ automatic reading, R UE  /90~ manual reading, R UE        Objective Measures updated at progress report unless specified. See below:      Lower Extremity Strength~ tested in sitting*  Right LE eval  9/13/22 10/6/22 Left LE eval  9/13/22 10/6/22   Hip flexion:  4-/5 3-/5 4-/5 Hip flexion: 4-/5 3-/5 4-/5   Knee extension: 5/5 5/5 5/5 Knee extension: 5/5 5/5 5/5   Knee flexion: 4+/5 4/5 4+/5 Knee flexion: 4+/5 4+/5 5/5   Ankle dorsiflexion:  0/5 0/5 0/5  Ankle dorsiflexion: 4/5 4-/5 4+/5   Ankle plantarflexion:  4/5 4+/5 4+/5 Ankle plantarflexion: 5/5 4+/5 4+/5   Hip abduction: 4+/5 5/5 5/5 Hip abduction: 5/5 5/5 5/5   Hip adduction: 4+/5 5/5 5/5 Hip adduction 5/5 5/5 5/5   Hip extension: 5/5 5/5 5/5 Hip extension: 5/5 5/5 5/5    *some difficulty with sustaining contractions and coordination of movement          Evaluation 9/13/22 10/6/22   Single Limb Stance R LE NT  (<10 sec = HIGH FALL RISK) NT  (<10 sec = HIGH FALL RISK) NT  (<10 sec = HIGH FALL RISK)   Single Limb Stance L LE NT  (<10 sec = HIGH FALL RISK) NT  (<10 sec = HIGH FALL RISK) NT  (<10 sec = HIGH FALL RISK)   30 second Chair Rise NT NT NT   5 times sit to stand 22 seconds~ slightly short of full knee extension 13 seconds~ slightly short of full knee extension 15 seconds(average of 2 trials)~ slightly short of full knee extension   TUG 30 seconds with RW, SBA 24 seconds with rollator, CGA 19 seconds with rollator, SBA   Self selected walking speed 0.46 m/sec (6m/13s) with RW, SBA 0.4 m/sec (6m/15s) with rollator, CGA 0.5 m/sec (6m/12s) with rollator, SBA   Fast walking speed NT NT NT      5 x sit<> stand > 12 seconds indicates fall risk     Postural control:  MCTSIB:  1. Eyes Open/feet together/Firm: 30 seconds, P  2. Eyes Closed/feet together/Firm: 3 seconds, F  3. Eyes Open/feet together/Foam: 14 seconds, F  4. Eyes Closed/feet together/Foam: 2 seconds, F     9/13/22  Postural control:  MCTSIB:  1. Eyes Open/feet together/Firm: 30 seconds, P  2. Eyes Closed/feet together/Firm: 3 seconds, F  3. Eyes Open/feet together/Foam: 30 seconds, P  4. Eyes Closed/feet together/Foam: 2 seconds, F     10/6/22  Postural control:  MCTSIB:  1. Eyes Open/feet together/Firm: 30 seconds, P  2. Eyes Closed/feet together/Firm: 3 seconds, F  3. Eyes Open/feet together/Foam: 3 seconds, F  4. Eyes Closed/feet together/Foam: NT      CMS Impairment/Limitation/Restriction for FOTO Peripheral Nervous System Disorders/Injuries  Survey  Status Limitation G-Code CMS Severity Modifier  Intake 40% 60%  Predicted 51% 49% Goal Status+ CK - At least 40 percent but less than 60 percent  10/6/2022 36% 64% Current Status CL - At least 60 percent but less than 80 percent  D/C Status CL **only report if this is discharge survey        Treatment     Camila received the treatments listed below:      therapeutic exercises to develop strength, endurance, ROM, flexibility, posture, and core stabilization for 45 minutes including:  (Includes above tests and measures)    X 5 minutes on SCI-FIT recumbent stepper, level 2.0, for B UE and LE muscular and CV endurance  Sp02 93% and          neuromuscular re-education activities to improve: Balance, Coordination, Kinesthetic, Sense, Proprioception, and Posture for 0 minutes. The following activities were included:    therapeutic activities to improve functional performance for 0  minutes, including:      gait training to improve functional mobility and safety for 0  minutes, including:            Patient Education and Home Exercises     Home Exercises Provided and Patient Education Provided     Education provided:   - 9/1/22: pt provided with first installment of her HEP. PT also educated pt regarding the fact that he would like to ask MD for an order for R LE carbon fiber AFO. Pt also is requesting a prescription for a RW( which would be safer for her versus her current rollator).  -9/13/22: PT educated pt in simple diaphragmatic breathing technique.  -10/6/22: PT provided a schedule slip for the next 8 weeks, at 1 x weekly frequency. PT also requested she talk with her PCP regarding PT prior request for R carbon fiber AFO and RW orders.    Written Home Exercises Provided: yes. Exercises were reviewed and Camila was able to demonstrate them prior to the end of the session.  Camila demonstrated good  understanding of the education provided. See EMR under Patient Instructions for exercises provided during  therapy sessions    ASSESSMENT       Camila tolerated her treatment session fairly well for her updated plan of care reassessment. Due to transportation issues and recent illness, pt has only attended 3 follow-up sessions since her evaluation on 8/4/22. PT continues to monitor vitals and pt's BP was considerably elevated via automatic cuff. PT took manually and got a much different value for diastolic BP, so he proceeded with session. Pt's activity tolerance for ambulation remains limited, though she was able to travel from lobby to gym and vice-versa without a standing rest break. With regard to pt's test results, she demonstrated several improvements, which were a bit surprising( considering she hasn't attended many appointments). Scattered improvements noted for B LE MMT and her scores for TUG and SSWS both improved. Unfortunately, she was unable to pass condition # 3 of the MCTSIB, which caused her to cry briefly. PT's main concerns moving forward are pt's ability to consistently attend visits and the fact that she is very unhealthy for someone so young. Camila remains appropriate for continued visits but will drop frequency to 1 x weekly per her request. PT would like to extend the current plan of care for another 8 weeks( until 12/1/22). See below for most recent comments regarding goal achievement and any revisions made.          Camila Is progressing well towards her goals.   Pt prognosis is Fair.     Pt will continue to benefit from skilled outpatient physical therapy to address the deficits listed in the problem list box on initial evaluation, provide pt/family education and to maximize pt's level of independence in the home and community environment.     Pt's spiritual, cultural and educational needs considered and pt agreeable to plan of care and goals.     Anticipated barriers to physical therapy: pt does not drive, history of falls    Goals:     Short Term Goals: 4 weeks   Pt will be issued first  installment of HEP and report at least partial compliance~MET, 9/13/22  Pt will improve her 5 x sit<> stand test score to 20 seconds or < to demonstrate decreased fall risk~MET, 9/13/22  Pt will improve her TUG score to 27 seconds or < with RW for improved household ambulation with decreased fall risk~MET, 9/13/22  Pt will improve her SSWS to 0.50 m/sec with RW for improved community ambulation with decreased fall risk~MET, 10/6/22  Pt will improve her score on condition # 3 of the mCTSIB to 17 seconds for improved ability to balance on compliant surfaces~MET, 9/13/22, NOT MET, 10/6/22     Long Term Goals: 8 weeks   Pt will report </= 49 % limitation using FOTO assessment tool in order to demonstrate improved perception of abilities~ongoing, will assess at next visit on 9/15/22, NOT MET, 10/6/22  Pt will improve her 5 x sit<> stand test score to 18 seconds or < to demonstrate decreased fall risk~ MET, 9/13/22  Pt will improve her TUG score to 24 seconds or < with RW for improved household ambulation with decreased fall risk~MET, 9/13/22~ Revise to 22 seconds or <, MET, 10/6/22~ Revise again to 18 seconds or <  Pt will improve her SSWS to 0.55 m/sec with RW for improved community ambulation with decreased fall risk~ongoing   Pt will improve her score on condition # 3 of the mCTSIB to 20 seconds for improved ability to balance on compliant surfaces ~MET, 9/13/22, NOT MET, 10/6/22  Pt will be independent and compliant with finalized HEP to maintain potential gains realized in PT~ongoing           PLAN       Continue outpatient physical therapy 1 x weekly under updated Plan of Care, 10/6/22 to 12/1/22, with treatment to include: pt education, HEP, therapeutic exercises, neuromuscular re-education/balance exercises, therapeutic activities, joint mobilizations, and modalities PRN, to work towards established goals. Pt may be seen by PTA to carry out plan of care.             Continue to monitor pt's vitals at each  treatment session; progress strengthening, endurance and balance activities to pt tolerance.      Stephon Strong, PT    10/6/2022

## 2022-10-07 ENCOUNTER — TELEPHONE (OUTPATIENT)
Dept: HEPATOLOGY | Facility: CLINIC | Age: 52
End: 2022-10-07
Payer: MEDICAID

## 2022-10-12 ENCOUNTER — OFFICE VISIT (OUTPATIENT)
Dept: PRIMARY CARE CLINIC | Facility: CLINIC | Age: 52
End: 2022-10-12
Payer: MEDICAID

## 2022-10-12 VITALS
HEIGHT: 66 IN | DIASTOLIC BLOOD PRESSURE: 78 MMHG | SYSTOLIC BLOOD PRESSURE: 120 MMHG | BODY MASS INDEX: 36.74 KG/M2 | OXYGEN SATURATION: 99 % | WEIGHT: 228.63 LBS | RESPIRATION RATE: 18 BRPM | HEART RATE: 78 BPM
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
## 2022-10-12 DIAGNOSIS — K76.0 FATTY LIVER: ICD-10-CM

## 2022-10-12 DIAGNOSIS — E53.8 FOLIC ACID DEFICIENCY: ICD-10-CM

## 2022-10-12 DIAGNOSIS — R76.8 ELEVATED RHEUMATOID FACTOR: ICD-10-CM

## 2022-10-12 DIAGNOSIS — E55.9 VITAMIN D DEFICIENCY: ICD-10-CM

## 2022-10-12 DIAGNOSIS — R79.89 ELEVATED LIVER FUNCTION TESTS: ICD-10-CM

## 2022-10-12 DIAGNOSIS — M21.371 FOOT DROP, RIGHT: ICD-10-CM

## 2022-10-12 DIAGNOSIS — Z12.11 ENCOUNTER FOR SCREENING FOR MALIGNANT NEOPLASM OF COLON: Primary | ICD-10-CM

## 2022-10-12 DIAGNOSIS — G62.9 POLYNEUROPATHY: ICD-10-CM

## 2022-10-12 PROCEDURE — 3074F SYST BP LT 130 MM HG: CPT | Mod: CPTII,,, | Performed by: FAMILY MEDICINE

## 2022-10-12 PROCEDURE — 3008F BODY MASS INDEX DOCD: CPT | Mod: CPTII,,, | Performed by: FAMILY MEDICINE

## 2022-10-12 PROCEDURE — 99999 PR PBB SHADOW E&M-EST. PATIENT-LVL III: CPT | Mod: PBBFAC,,, | Performed by: FAMILY MEDICINE

## 2022-10-12 PROCEDURE — 3008F PR BODY MASS INDEX (BMI) DOCUMENTED: ICD-10-PCS | Mod: CPTII,,, | Performed by: FAMILY MEDICINE

## 2022-10-12 PROCEDURE — 1159F MED LIST DOCD IN RCRD: CPT | Mod: CPTII,,, | Performed by: FAMILY MEDICINE

## 2022-10-12 PROCEDURE — 99214 OFFICE O/P EST MOD 30 MIN: CPT | Mod: S$PBB,,, | Performed by: FAMILY MEDICINE

## 2022-10-12 PROCEDURE — 99214 PR OFFICE/OUTPT VISIT, EST, LEVL IV, 30-39 MIN: ICD-10-PCS | Mod: S$PBB,,, | Performed by: FAMILY MEDICINE

## 2022-10-12 PROCEDURE — 1159F PR MEDICATION LIST DOCUMENTED IN MEDICAL RECORD: ICD-10-PCS | Mod: CPTII,,, | Performed by: FAMILY MEDICINE

## 2022-10-12 PROCEDURE — 99999 PR PBB SHADOW E&M-EST. PATIENT-LVL III: ICD-10-PCS | Mod: PBBFAC,,, | Performed by: FAMILY MEDICINE

## 2022-10-12 PROCEDURE — 99213 OFFICE O/P EST LOW 20 MIN: CPT | Mod: PBBFAC,PN | Performed by: FAMILY MEDICINE

## 2022-10-12 PROCEDURE — 3078F DIAST BP <80 MM HG: CPT | Mod: CPTII,,, | Performed by: FAMILY MEDICINE

## 2022-10-12 PROCEDURE — 3078F PR MOST RECENT DIASTOLIC BLOOD PRESSURE < 80 MM HG: ICD-10-PCS | Mod: CPTII,,, | Performed by: FAMILY MEDICINE

## 2022-10-12 PROCEDURE — 3074F PR MOST RECENT SYSTOLIC BLOOD PRESSURE < 130 MM HG: ICD-10-PCS | Mod: CPTII,,, | Performed by: FAMILY MEDICINE

## 2022-10-12 NOTE — PROGRESS NOTES
Subjective:       Patient ID: Camila Adan is a 51 y.o. female.    Chief Complaint: Follow-up    HPI: 50 yo WF-in for checkup--eating well--+BM--ambulating well with walker--patient goes to physical therapy--likes other walker better patient states has drop foot has no from physical therapist suggested a different walkerright AFO and rolling walker Hw will try rightb AFOto right lower extremity patient unable to dorsiflex right foot has difficulty moving toes of the right foot       Pt to get Hep shot DR Pantoja--hepatology--appt 10-                 Skin: no psoriasis, eczema, skin cancer-  HEENT: No headache, ocular pain, blurred vision, diplopia, epistaxis, hoarseness change in voice, thyroid trouble  Lung: No pneumonia, asthma, Tb, wheezing, SOB, smoking half pack per day  Heart: No chest pain, +ankle edema--were swollen other day--just left ankle , no  palpitations, MI, shane murmur, hypertension, hyperlipidemia  Abdomen: No nausea,no vomiting   no  diarrhea, constipation, ulcers, hepatitis, gallbladder disease, melena, hematochezia, hematemesis  : no UTI, renal disease, stones  GYN LMP had 1 last month but going through menopause very ratty  MS: no fractures, O/A, lupus, rheumatoid, gout--bilateral leg pain left much greater than right--the bilateral polyneuropathy--contusion right great toe--right foot drop  Neuro: No dizziness, LOC, seizures   No diabetes, no anemia, no anxiety, no depression  Single lives with roommate--3 children--work- unemployed  lives with roommate      Objective:   Physical Exam:    General: Well nourished, well developed, no acute distress +obesity  Skin:  Ecchymosis right chest wall 9 x 6 and 0.5 cm  HEENT: Eyes PERRLA, EOM intact, nose patent, throat non-erythematous ears  left TM slightly injected  NECK: Supple, no bruits, No JVD, no nodes  Lungs: Clear, no rales, rhonchi, wheezing  Heart: Regular rate and rhythm, no murmurs, gallops, or rubs  Abdomen: flat,  bowel sounds positive, no tenderness, or organomegaly  MS: very poor gait --widen base feet--shuffles feet--very hard for pt get on exam table --despite step up--has right foot drop-- can't dorsiflex foot   Neuro: Alert, CN intact, oriented X 3  Extremities: No cyanosis, clubbing, or edema         Assessment:       1. Encounter for screening for malignant neoplasm of colon    2. Foot drop, right    3. Polyneuropathy    4. Elevated rheumatoid factor    5. Folic acid deficiency    6. Vitamin D deficiency    7. Fatty liver    8. Elevated liver function tests    9. Fall, subsequent encounter        Plan:       Encounter for screening for malignant neoplasm of colon  -     Cologuard Screening (Multitarget Stool DNA); Future; Expected date: 10/12/2022    Foot drop, right    Polyneuropathy    Elevated rheumatoid factor    Folic acid deficiency    Vitamin D deficiency    Fatty liver    Elevated liver function tests    Fall, subsequent encounter      Main Reason for Visit-    Numbness feet to just below knees --has appt with podiatrist  Neuropathy unable walk except with cane or walker better--fantasma with foot drop--PT suggest AFO --rolling walker --pt states does much better with that kind walker PT wants pt get one   Morbid Obesity needs lose weight   Other medical issues  Falls --right leg gives out--but both legs get weak and shake---MRI add lumbar spine was denied--x-ray shows spondylosis with narrowing of the disc spaces facet arthropathy osteophytes  +elevated rheumatoid factor see rheumatologist sed rate 41--redo sed rate CRP  Neurology appointment--patient had EMG showing neuropathy bilateral--either toxic or metabolic  Elevated liver function tests see hepatologist--sees Dr Palencia  May 2021 femoral artery Dopplers showed no stenosis  Lab large amount Lab 08/20/2022--no more Lab for six-month  Health maintenance Pap smear mammogram colorectal screen flu

## 2022-10-17 RX ORDER — POTASSIUM CHLORIDE 750 MG/1
CAPSULE, EXTENDED RELEASE ORAL
Qty: 90 CAPSULE | Refills: 3 | Status: SHIPPED | OUTPATIENT
Start: 2022-10-17 | End: 2023-09-25

## 2022-10-17 NOTE — TELEPHONE ENCOUNTER
No new care gaps identified.  [facility] Embedded Care Gaps. Reference number: [id number]. 10/17/2022   8:05:17 AM OLGAT

## 2022-10-17 NOTE — TELEPHONE ENCOUNTER
Refill Decision Note   Camila Adan  is requesting a refill authorization.  Brief Assessment and Rationale for Refill:  Approve     Medication Therapy Plan:       Medication Reconciliation Completed: No   Comments:     No Care Gaps recommended.     Note composed:12:37 PM 10/17/2022

## 2022-10-19 NOTE — PROGRESS NOTES
[facility]   Physical Therapy Treatment Note     Name: Camila Adan  Clinic Number: [id number]    Therapy Diagnosis:   Encounter Diagnoses   Name Primary?    Impaired functional mobility, balance, gait, and endurance Yes    Foot drop, right     Right leg weakness            Physician: Orestes Hancock MD    Visit Date: 10/20/2022    Physician Orders:  PT eval and treat  Medical Diagnosis from Referral: polyneuropathy  Evaluation Date: 8/4/2022  Authorization Period Expiration: 12/31/22  Plan of Care Expiration: 9/29/22  Updated Plan of Care Expiration: 12/1/22  Visit # / Visits authorized: 4/ 25(+eval)    PTA Visit #: 0/5     Time In: 0821     Time Out: 0916   Total Billable Time: 55    minutes    SUBJECTIVE     Pt reports: she saw her primary care physician recently and she indicates he wrote orders for R AFO and RW. However, PT unable to locate these in patient's chart. PT informed pt that he will message MD regarding above.   She was partially compliant with home exercise program.  Response to previous treatment: no adverse effects   Functional change: ongoing    Pain: 0/10  Location: N/A       OBJECTIVE   PT provides S for safety while pt ambulates from lobby to gym with her rollator~ pt able to ambulate entire distance to gym without standing rest break. Pt continues to demonstrate steppage gait with R LE due to R foot drop.    Vitals at start of session:  Sp02 95% and ~ after ambulating from lobby to gym  /100~ manual reading, R UE  /101~ automatic reading, R UE        Objective Measures updated at progress report unless specified.       Treatment     Camila received the treatments listed below:      therapeutic exercises to develop strength, endurance, ROM, flexibility, posture, and core stabilization for 55 minutes including:        2 x 10 sit<> stand transitions from standard chair~rollator placed anterior to pt  Sp02 95% and  after set 1  Sp02 96%  "and  after set 2      Inside parallel bars:    2 x 10 standing B hip flexion, B UE support   2 x 10 standing B heel raises, B UE support  Sp02 96% and ~ seated rest break    2 x 10 standing B knee flexion, B UE support  Sp02 95% and ~ seated rest break    /92  /96      Inside parallel bars:    2 x 30" static standing on foam pad, no UE support, CGA    3 x 30" static standing on firm ground, no UE support, eyes closed, min A to slight mod A ~ numerous episodes of grasping bars~ anterior lean noted    2 x 30" static standing on foam fitter, no UE support, CGA    1 x 30" B alternate semi-tandem stance, no UE support, CGA    Sp02 97% and  after above balance tasks performed  /94    neuromuscular re-education activities to improve: Balance, Coordination, Kinesthetic, Sense, Proprioception, and Posture for 0 minutes. The following activities were included:    I  therapeutic activities to improve functional performance for 0  minutes, including:      gait training to improve functional mobility and safety for 0  minutes, including:            Patient Education and Home Exercises     Home Exercises Provided and Patient Education Provided     Education provided:   - 9/1/22: pt provided with first installment of her HEP. PT also educated pt regarding the fact that he would like to ask MD for an order for R LE carbon fiber AFO. Pt also is requesting a prescription for a RW( which would be safer for her versus her current rollator).  -9/13/22: PT educated pt in simple diaphragmatic breathing technique.  -10/6/22: PT provided a schedule slip for the next 8 weeks, at 1 x weekly frequency. PT also requested she talk with her PCP regarding PT prior request for R carbon fiber AFO and RW orders.    Written Home Exercises Provided: yes. Exercises were reviewed and Camila was able to demonstrate them prior to the end of the session.  Camila demonstrated good  understanding of the education " provided. See EMR under Patient Instructions for exercises provided during therapy sessions    ASSESSMENT     Camila tolerated today's session fairly at best due to continued shortness of breath with minimal activity. She also continues to demonstrate high BP. Today's initial readings were higher than any previous recordings. However, PT continued to monitor throughout treatment session and these values did eventually come down. Even so, pt's pressure remains high. It should be noted that pt was observed smoking outside the clinic just prior to her appointment. PT provided the appropriate education regarding the relationship between high blood pressure and smoking. Pt was able to perform limited standing balance and strengthening exercises inside the parallel bars, though frequent rest periods needed to control breathing rate. Eyes closed balance remains most difficult due to neuropathy. As mentioned in prior notes, PT continues to be concerned about future visits due to pt's poor health and limited activity tolerance. Camila remains appropriate for 1 x weekly PT visits to address her current deficits.    Camila Is progressing well towards her goals.   Pt prognosis is Fair.     Pt will continue to benefit from skilled outpatient physical therapy to address the deficits listed in the problem list box on initial evaluation, provide pt/family education and to maximize pt's level of independence in the home and community environment.     Pt's spiritual, cultural and educational needs considered and pt agreeable to plan of care and goals.     Anticipated barriers to physical therapy: pt does not drive, history of falls    Goals:     Short Term Goals: 4 weeks   Pt will be issued first installment of HEP and report at least partial compliance~MET, 9/13/22  Pt will improve her 5 x sit<> stand test score to 20 seconds or < to demonstrate decreased fall risk~MET, 9/13/22  Pt will improve her TUG score to 27 seconds or < with  RW for improved household ambulation with decreased fall risk~MET, 9/13/22  Pt will improve her SSWS to 0.50 m/sec with RW for improved community ambulation with decreased fall risk~MET, 10/6/22  Pt will improve her score on condition # 3 of the mCTSIB to 17 seconds for improved ability to balance on compliant surfaces~MET, 9/13/22, NOT MET, 10/6/22     Long Term Goals: 8 weeks   Pt will report </= 49 % limitation using FOTO assessment tool in order to demonstrate improved perception of abilities~ongoing, will assess at next visit on 9/15/22, NOT MET, 10/6/22  Pt will improve her 5 x sit<> stand test score to 18 seconds or < to demonstrate decreased fall risk~ MET, 9/13/22  Pt will improve her TUG score to 24 seconds or < with RW for improved household ambulation with decreased fall risk~MET, 9/13/22~ Revise to 22 seconds or <, MET, 10/6/22~ Revise again to 18 seconds or <  Pt will improve her SSWS to 0.55 m/sec with RW for improved community ambulation with decreased fall risk~ongoing   Pt will improve her score on condition # 3 of the mCTSIB to 20 seconds for improved ability to balance on compliant surfaces ~MET, 9/13/22, NOT MET, 10/6/22  Pt will be independent and compliant with finalized HEP to maintain potential gains realized in PT~ongoing           PLAN         Continue to monitor pt's vitals at each treatment session; progress strengthening, endurance and balance activities to pt tolerance.      Stephon Strong, PT    10/20/2022

## 2022-10-20 ENCOUNTER — CLINICAL SUPPORT (OUTPATIENT)
Dept: REHABILITATION | Facility: HOSPITAL | Age: 52
End: 2022-10-20
Attending: FAMILY MEDICINE
Payer: MEDICAID

## 2022-10-20 DIAGNOSIS — Z74.09 IMPAIRED FUNCTIONAL MOBILITY, BALANCE, GAIT, AND ENDURANCE: Primary | ICD-10-CM

## 2022-10-20 DIAGNOSIS — M21.371 FOOT DROP, RIGHT: ICD-10-CM

## 2022-10-20 DIAGNOSIS — R29.898 RIGHT LEG WEAKNESS: ICD-10-CM

## 2022-10-20 PROCEDURE — 97110 THERAPEUTIC EXERCISES: CPT | Mod: PO

## 2022-10-25 ENCOUNTER — TELEPHONE (OUTPATIENT)
Dept: PRIMARY CARE CLINIC | Facility: CLINIC | Age: 52
End: 2022-10-25
Payer: MEDICAID

## 2022-10-25 NOTE — TELEPHONE ENCOUNTER
Returned call to patient. Informed her that I faxed orders to [facility] today for her foot lift and walker.

## 2022-10-25 NOTE — TELEPHONE ENCOUNTER
----- Message from Jenn Norton sent at 10/25/2022  4:04 PM CDT -----  Contact: Patient, [phone]  Patient is returning a phone call.  Who left a message for the patient: Whit  Does patient know what this is regarding:  Orders  Would you like a call back, or a response through your [facility] portal?:   Call back  Comments:  Missed your call, please call her back. Thanks.

## 2022-10-25 NOTE — TELEPHONE ENCOUNTER
----- Message from Araceli Haskins sent at 10/25/2022  3:20 PM CDT -----  Contact: [phone]  Patient is calling about an order for a foot lift and a walker, please call and advise.

## 2022-10-26 DIAGNOSIS — R79.89 ELEVATED LIVER FUNCTION TESTS: Primary | ICD-10-CM

## 2022-10-27 ENCOUNTER — TELEPHONE (OUTPATIENT)
Dept: HEPATOLOGY | Facility: CLINIC | Age: 52
End: 2022-10-27
Payer: MEDICAID

## 2022-10-27 NOTE — TELEPHONE ENCOUNTER
Spoke with her relative, advised that she can call [facility] to have her ultrasound done

## 2022-10-31 ENCOUNTER — DOCUMENTATION ONLY (OUTPATIENT)
Dept: REHABILITATION | Facility: HOSPITAL | Age: 52
End: 2022-10-31
Payer: MEDICAID

## 2022-10-31 NOTE — PROGRESS NOTES
Face to face meeting completed with Stephon Strong PT regarding current status and progress of   Camila Adan .  Elvin Yancey, PTA

## 2022-11-17 ENCOUNTER — PATIENT MESSAGE (OUTPATIENT)
Dept: PHARMACY | Facility: CLINIC | Age: 52
End: 2022-11-17
Payer: MEDICAID

## 2022-11-17 ENCOUNTER — TELEPHONE (OUTPATIENT)
Dept: PRIMARY CARE CLINIC | Facility: CLINIC | Age: 52
End: 2022-11-17

## 2022-11-17 ENCOUNTER — HOSPITAL ENCOUNTER (OUTPATIENT)
Dept: RADIOLOGY | Facility: HOSPITAL | Age: 52
Discharge: HOME | End: 2022-11-17
Attending: FAMILY MEDICINE
Payer: MEDICAID

## 2022-11-17 ENCOUNTER — CLINICAL SUPPORT (OUTPATIENT)
Dept: REHABILITATION | Facility: HOSPITAL | Age: 52
End: 2022-11-17
Attending: FAMILY MEDICINE
Payer: MEDICAID

## 2022-11-17 VITALS — HEIGHT: 66 IN | WEIGHT: 226 LBS | BODY MASS INDEX: 36.32 KG/M2

## 2022-11-17 DIAGNOSIS — Z12.31 VISIT FOR SCREENING MAMMOGRAM: ICD-10-CM

## 2022-11-17 DIAGNOSIS — M21.371 FOOT DROP, RIGHT: ICD-10-CM

## 2022-11-17 DIAGNOSIS — Z74.09 IMPAIRED FUNCTIONAL MOBILITY, BALANCE, GAIT, AND ENDURANCE: Primary | ICD-10-CM

## 2022-11-17 DIAGNOSIS — R29.898 RIGHT LEG WEAKNESS: ICD-10-CM

## 2022-11-17 PROCEDURE — 77067 SCR MAMMO BI INCL CAD: CPT | Mod: 26,,, | Performed by: RADIOLOGY

## 2022-11-17 PROCEDURE — 77063 MAMMO DIGITAL SCREENING BILAT WITH TOMO: ICD-10-PCS | Mod: 26,,, | Performed by: RADIOLOGY

## 2022-11-17 PROCEDURE — 77063 BREAST TOMOSYNTHESIS BI: CPT | Mod: 26,,, | Performed by: RADIOLOGY

## 2022-11-17 PROCEDURE — 77067 SCR MAMMO BI INCL CAD: CPT | Mod: TC

## 2022-11-17 PROCEDURE — 97110 THERAPEUTIC EXERCISES: CPT | Mod: PO

## 2022-11-17 PROCEDURE — 77067 MAMMO DIGITAL SCREENING BILAT WITH TOMO: ICD-10-PCS | Mod: 26,,, | Performed by: RADIOLOGY

## 2022-11-17 PROCEDURE — 77063 BREAST TOMOSYNTHESIS BI: CPT | Mod: TC

## 2022-11-17 NOTE — PROGRESS NOTES
[facility]   Physical Therapy Treatment Note     Name: Camila Adan  Clinic Number: [id number]    Therapy Diagnosis:   Encounter Diagnoses   Name Primary?    Impaired functional mobility, balance, gait, and endurance Yes    Foot drop, right     Right leg weakness              Physician: Orestes Hancock MD    Visit Date: 11/17/2022    Physician Orders:  PT eval and treat  Medical Diagnosis from Referral: polyneuropathy  Evaluation Date: 8/4/2022  Authorization Period Expiration: 12/31/22  Plan of Care Expiration: 9/29/22  Updated Plan of Care Expiration: 12/1/22  Visit # / Visits authorized: 5/ 25(+eval)    PTA Visit #: 0/5     Time In: 1258      Time Out: 1345    Total Billable Time: 47     minutes    SUBJECTIVE     Pt reports: her feet are numb but they don't hurt. She went to get a mammogram before therapy session today.   She was partially compliant with home exercise program.  Response to previous treatment: no adverse effects   Functional change: ongoing    Pain: 0/10  Location: N/A       OBJECTIVE   PT provides S for safety while pt ambulates from lobby to gym with her rollator~ pt able to ambulate entire distance to gym without standing rest break. Pt continues to demonstrate steppage gait with R LE due to R foot drop.    Vitals at start of session:  Sp02 86% and HR 92~ after ambulating from lobby to gym  /113~ manual reading with pt's wrist cuff from home, R UE  /82~ automatic reading, R UE  /80~ manual reading, R UE        Objective Measures updated at progress report unless specified.       Treatment     Camila received the treatments listed below:      therapeutic exercises to develop strength, endurance, ROM, flexibility, posture, and core stabilization for 47 minutes including:        2 x 10 sit<> stand transitions from standard chair  Sp02 93% and  after set 1  Sp02 97% and  after set 2    X 1 minute seated tossing basketball back and  "forth with PT  Sp02 97% and      Horizontal leg press: PT places non-skid to foot plate to help hold feet in place  3 x 10 reps with 80 # applied  Sp02 98% and  after set 1  Sp02 98% and  after set 2  Sp02 96% and  after set 3    Inside parallel bars:    2 x 10 standing B hip flexion, B UE support   2 x 10 standing B heel raises, B UE support  Unable to obtain vitals but pt remains standing    2 x 10 standing B knee flexion, B UE support  Sp02 93% and ~ seated rest break        Inside parallel bars:    3 x 30" static standing on foam pad, no UE support, min A trial 1( pt mistakenly performed part of trial with eyes closed), CGA trials 2 and 3      neuromuscular re-education activities to improve: Balance, Coordination, Kinesthetic, Sense, Proprioception, and Posture for 0 minutes. The following activities were included:    I  therapeutic activities to improve functional performance for 0  minutes, including:      gait training to improve functional mobility and safety for 0  minutes, including:            Patient Education and Home Exercises     Home Exercises Provided and Patient Education Provided     Education provided:   - 9/1/22: pt provided with first installment of her HEP. PT also educated pt regarding the fact that he would like to ask MD for an order for R LE carbon fiber AFO. Pt also is requesting a prescription for a RW( which would be safer for her versus her current rollator).  -9/13/22: PT educated pt in simple diaphragmatic breathing technique.  -10/6/22: PT provided a schedule slip for the next 8 weeks, at 1 x weekly frequency. PT also requested she talk with her PCP regarding PT prior request for R carbon fiber AFO and RW orders.  -11/17/22: PT provided pt written orders for R carbon fiber AFO and rollator walker( PT actually requested rolling walker, but MD wrote for rollator). PT also provided 3 local orthotic companies she can call to fill the first order. Finally, " PT provided pt a schedule slip for the last 2 weeks of December.    Written Home Exercises Provided: yes. Exercises were reviewed and Camila was able to demonstrate them prior to the end of the session.  Camila demonstrated good  understanding of the education provided. See EMR under Patient Instructions for exercises provided during therapy sessions    ASSESSMENT     Camila tolerated today's session much better than the previous couple of treatments. Her BP was much lower today when PT used his automatic and manual cuffs; pt's cuff from home is made to be placed on wrist and this device recorded much higher systolic and diastolic readings. While pt demonstrated some shortness of breath with certain activities, this appeared improved today. Camila also required fewer seated rest periods while performing standing and balance exercises. PT introduced horizontal leg press today, and pt performed reasonably well here with verbal and tactile cues. PT provided pt with written copy of orders for R AFO and rollator( PT actually requested RW). As mentioned in prior notes, PT continues to be concerned about future visits due to pt's poor health and limited activity tolerance. Camila remains appropriate for 1 x weekly PT visits to address her current deficits.    Camila Is progressing well towards her goals.   Pt prognosis is Fair.     Pt will continue to benefit from skilled outpatient physical therapy to address the deficits listed in the problem list box on initial evaluation, provide pt/family education and to maximize pt's level of independence in the home and community environment.     Pt's spiritual, cultural and educational needs considered and pt agreeable to plan of care and goals.     Anticipated barriers to physical therapy: pt does not drive, history of falls    Goals:     Short Term Goals: 4 weeks   Pt will be issued first installment of HEP and report at least partial compliance~MET, 9/13/22  Pt will improve her  5 x sit<> stand test score to 20 seconds or < to demonstrate decreased fall risk~MET, 9/13/22  Pt will improve her TUG score to 27 seconds or < with RW for improved household ambulation with decreased fall risk~MET, 9/13/22  Pt will improve her SSWS to 0.50 m/sec with RW for improved community ambulation with decreased fall risk~MET, 10/6/22  Pt will improve her score on condition # 3 of the mCTSIB to 17 seconds for improved ability to balance on compliant surfaces~MET, 9/13/22, NOT MET, 10/6/22     Long Term Goals: 8 weeks   Pt will report </= 49 % limitation using FOTO assessment tool in order to demonstrate improved perception of abilities~ongoing, will assess at next visit on 9/15/22, NOT MET, 10/6/22  Pt will improve her 5 x sit<> stand test score to 18 seconds or < to demonstrate decreased fall risk~ MET, 9/13/22  Pt will improve her TUG score to 24 seconds or < with RW for improved household ambulation with decreased fall risk~MET, 9/13/22~ Revise to 22 seconds or <, MET, 10/6/22~ Revise again to 18 seconds or <  Pt will improve her SSWS to 0.55 m/sec with RW for improved community ambulation with decreased fall risk~ongoing   Pt will improve her score on condition # 3 of the mCTSIB to 20 seconds for improved ability to balance on compliant surfaces ~MET, 9/13/22, NOT MET, 10/6/22  Pt will be independent and compliant with finalized HEP to maintain potential gains realized in PT~ongoing           PLAN         Continue to monitor pt's vitals at each treatment session; progress strengthening, endurance and balance activities to pt tolerance.      Stephon Strong, PT    11/17/2022

## 2022-11-17 NOTE — PROGRESS NOTES
This patient was administered a dose of B12 for injection in her left deltoid.  She tolerated this dose well.

## 2022-11-17 NOTE — TELEPHONE ENCOUNTER
----- Message from Kelly Elder sent at 11/17/2022  2:51 PM CST -----  Contact: [phone]  .1 Patient would like to get medical advice.  Symptoms (please be specific): headaches  How long has patient had these symptoms: 2 weeks  Pharmacy name and phone#:[facility] - [place], [place] - [address] Phone:  [phone]  Fax:  [phone]  Any drug allergies: onfile  Comments: Patient would like to get medical advice. Patient stated that she has been taking medication of the counter and is not helping. Patient want to know if something can be call in before 5 pm that's when pharmacy close.  Thank you

## 2022-11-20 RX ORDER — BUTALBITAL, ACETAMINOPHEN AND CAFFEINE 50; 325; 40 MG/1; MG/1; MG/1
TABLET ORAL
Qty: 30 TABLET | Refills: 0 | Status: SHIPPED | OUTPATIENT
Start: 2022-11-19 | End: 2022-11-22 | Stop reason: SDUPTHER

## 2022-11-22 ENCOUNTER — OFFICE VISIT (OUTPATIENT)
Dept: PRIMARY CARE CLINIC | Facility: CLINIC | Age: 52
End: 2022-11-22
Payer: MEDICAID

## 2022-11-22 DIAGNOSIS — M21.371 FOOT DROP, RIGHT: ICD-10-CM

## 2022-11-22 DIAGNOSIS — G62.9 NEUROPATHY: Primary | ICD-10-CM

## 2022-11-22 DIAGNOSIS — K76.0 FATTY LIVER: ICD-10-CM
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
## 2022-11-22 DIAGNOSIS — J98.01 BRONCHOSPASM: ICD-10-CM

## 2022-11-22 DIAGNOSIS — R51.9 NONINTRACTABLE EPISODIC HEADACHE, UNSPECIFIED HEADACHE TYPE: ICD-10-CM

## 2022-11-22 DIAGNOSIS — E53.8 FOLIC ACID DEFICIENCY: ICD-10-CM

## 2022-11-22 PROCEDURE — 99214 PR OFFICE/OUTPT VISIT, EST, LEVL IV, 30-39 MIN: ICD-10-PCS | Mod: 95,,, | Performed by: FAMILY MEDICINE

## 2022-11-22 PROCEDURE — 99214 OFFICE O/P EST MOD 30 MIN: CPT | Mod: 95,,, | Performed by: FAMILY MEDICINE

## 2022-11-22 RX ORDER — BUTALBITAL, ACETAMINOPHEN AND CAFFEINE 50; 325; 40 MG/1; MG/1; MG/1
TABLET ORAL
Qty: 30 TABLET | Refills: 0 | Status: SHIPPED | OUTPATIENT
Start: 2022-11-22 | End: 2023-01-03

## 2022-11-22 RX ORDER — LOSARTAN POTASSIUM 50 MG/1
50 TABLET ORAL DAILY
Qty: 90 TABLET | Refills: 3 | Status: SHIPPED | OUTPATIENT
Start: 2022-11-22 | End: 2023-10-31

## 2022-11-22 NOTE — PROGRESS NOTES
Subjective:       Patient ID: Camila Adan is a 51 y.o. female.    Chief Complaint: No chief complaint on file.    HPI:  51-year-old white female complaining of headache--started 10 days ago--- bilateral temporal area--= quality --throbbing--- severity 8-10--frequency--daily--duration 1 hour  No dizziness passing out seizures.  No head trauma---fell in the shower to get 10 staples in her head 2-3 years ago.  No epistaxis            Skin: no psoriasis, eczema, skin cancer-  HEENT: No headache, ocular pain, blurred vision, diplopia, epistaxis, hoarseness change in voice, thyroid trouble  Lung: No pneumonia, asthma, Tb, wheezing, SOB, smoking half pack per day  Heart: No chest pain, +ankle edema--were swollen other day--just left ankle , no  palpitations, MI, shane murmur, hypertension, hyperlipidemia  Abdomen: No nausea,no vomiting   no  diarrhea, constipation, ulcers, hepatitis, gallbladder disease, melena, hematochezia, hematemesis  : no UTI, renal disease, stones  GYN LMP had 1 last month but going through menopause very ratty  MS: no fractures, O/A, lupus, rheumatoid, gout--bilateral leg pain left much greater than right--the bilateral polyneuropathy--contusion right great toe--right foot drop  Neuro: No dizziness, LOC, seizures   No diabetes, no anemia, no anxiety, no depression  Single lives with roommate--3 children--work- unemployed  lives with roommate      Objective:   Physical Exam:    General: Well nourished, well developed, no acute distress +obesity  Skin:  Ecchymosis right chest wall 9 x 6 and 0.5 cm  HEENT: Eyes PERRLA, EOM intact, nose patent, throat non-erythematous ears  left TM slightly injected  NECK: Supple, no bruits, No JVD, no nodes  Lungs: Clear, no rales, rhonchi, wheezing  Heart: Regular rate and rhythm, no murmurs, gallops, or rubs  Abdomen: flat, bowel sounds positive, no tenderness, or organomegaly  MS: very poor gait --widen base feet--shuffles feet--very hard for pt  get on exam table --despite step up--has right foot drop-- can't dorsiflex foot   Neuro: Alert, CN intact, oriented X 3  Extremities: No cyanosis, clubbing, or edema         Assessment:       1. Neuropathy    2. Nonintractable episodic headache, unspecified headache type    3. Foot drop, right    4. Bronchospasm    5. Folic acid deficiency    6. Fatty liver    7. Fall, subsequent encounter          Plan:       Neuropathy    Nonintractable episodic headache, unspecified headache type    Foot drop, right    Bronchospasm    Folic acid deficiency    Fatty liver    Fall, subsequent encounter    Other orders  -     butalbital-acetaminophen-caffeine -40 mg (FIORICET, ESGIC) -40 mg per tablet; 1 poqd prn HA  Dispense: 30 tablet; Refill: 0  -     losartan (COZAAR) 50 MG tablet; Take 1 tablet (50 mg total) by mouth once daily.  Dispense: 90 tablet; Refill: 3        Main Reason for Visit-    Numbness feet to just below knees --  Has headache times 10 days see history of present illness--tried OTC medicines no relief--Fioricet 1 p.o. 1-2 times per day p.r.n. headache--patient needs to do headache diary  Patient had high blood pressure during physical therapy will add Cozaar 50 mg 1 p.o. q.d.  Other medical issues  Neuropathy unable walk except with cane or walker better--fantasma with foot drop--PT suggest AFO --rolling walker --pt states does much better with that kind walker PT wants pt get one   Morbid Obesity needs lose weight   Falls --right leg gives out--but both legs get weak and shake---MRI add lumbar spine was denied--x-ray shows spondylosis with narrowing of the disc spaces facet arthropathy osteophytes  +elevated rheumatoid factor see rheumatologist sed rate 41--redo sed rate CRP  Neurology appointment--patient had EMG showing neuropathy bilateral--either toxic or metabolic  Elevated liver function tests see hepatologist--sees Dr Palencia  May 2021 femoral artery Dopplers showed no stenosis  Lab large amount  Lab 08/20/2022--no more Lab for six-month  Health maintenance Pap smear mammogram colorectal screen flu                                           Established Patient - Audio Only Telehealth Visit     The patient location is:  Home  The chief complaint leading to consultation is:  Headaches  Visit type: Virtual visit with audio only (telephone)  Total time spent with patient:  30 minutes       The reason for the audio only service rather than synchronous audio and video virtual visit was related to technical difficulties or patient preference/necessity.     Each patient to whom I provide medical services by telemedicine is:  (1) informed of the relationship between the physician and patient and the respective role of any other health care provider with respect to management of the patient; and (2) notified that they may decline to receive medical services by telemedicine and may withdraw from such care at any time. Patient verbally consented to receive this service via voice-only telephone call.       HPI:  See history of present illness above     Assessment and plan:  See plan above                        This service was not originating from a related E/M service provided within the previous 7 days nor will  to an E/M service or procedure within the next 24 hours or my soonest available appointment.  Prevailing standard of care was able to be met in this audio-only visit.

## 2022-12-15 ENCOUNTER — DOCUMENTATION ONLY (OUTPATIENT)
Dept: REHABILITATION | Facility: HOSPITAL | Age: 52
End: 2022-12-15

## 2022-12-15 NOTE — PROGRESS NOTES
Camila did not attend her scheduled 3:15 PM physical therapy session today. She has not been in the clinic since 11/17/22. She has no further visits scheduled and has demonstrated limited attendance since her evaluation. PT will likely discharge at this time and enter a separate documentation only note indicating this. No charges posted today.    Stephon Strong, PT  12/15/2022

## 2022-12-20 ENCOUNTER — DOCUMENTATION ONLY (OUTPATIENT)
Dept: REHABILITATION | Facility: HOSPITAL | Age: 52
End: 2022-12-20
Payer: MEDICAID

## 2022-12-20 NOTE — PROGRESS NOTES
OUTPATIENT PHYSICAL THERAPY DISCHARGE SUMMARY     Name: Camila Adan  Clinic Number: [id number]    Diagnosis: polyneuropathy  Physician:  Orestes Hancock MD  Treatment Orders: PT Eval and Treat  Past Medical History:   Diagnosis Date    Allergy     Bronchitis     Fatty liver 8/16/2022       Initial visit: 8/4/22  Date of Last visit: 11/17/22  Date of Discharge Note:  12/20/22  Total Visits Received: 6  Missed Visits: multiple  ASSESSMENT   Status Towards Goals Met:       The below represents the most recent goal achievement by pt as of her final visit on 11/17/22:    Short Term Goals: 4 weeks   Pt will be issued first installment of HEP and report at least partial compliance~MET, 9/13/22  Pt will improve her 5 x sit<> stand test score to 20 seconds or < to demonstrate decreased fall risk~MET, 9/13/22  Pt will improve her TUG score to 27 seconds or < with RW for improved household ambulation with decreased fall risk~MET, 9/13/22  Pt will improve her SSWS to 0.50 m/sec with RW for improved community ambulation with decreased fall risk~MET, 10/6/22  Pt will improve her score on condition # 3 of the mCTSIB to 17 seconds for improved ability to balance on compliant surfaces~MET, 9/13/22, NOT MET, 10/6/22     Long Term Goals: 8 weeks   Pt will report </= 49 % limitation using FOTO assessment tool in order to demonstrate improved perception of abilities~ongoing, will assess at next visit on 9/15/22, NOT MET, 10/6/22  Pt will improve her 5 x sit<> stand test score to 18 seconds or < to demonstrate decreased fall risk~ MET, 9/13/22  Pt will improve her TUG score to 24 seconds or < with RW for improved household ambulation with decreased fall risk~MET, 9/13/22~ Revise to 22 seconds or <, MET, 10/6/22~ Revise again to 18 seconds or <  Pt will improve her SSWS to 0.55 m/sec with RW for improved community ambulation with decreased fall risk~ongoing   Pt will improve her score on condition # 3 of the mCTSIB to 20 seconds  for improved ability to balance on compliant surfaces ~MET, 22, NOT MET, 10/6/22  Pt will be independent and compliant with finalized HEP to maintain potential gains realized in PT~ongoing         Goals Not achieved and why:     Many goals not achieved due to poor attendance and pt having to drop to 1 x week frequency due to limited transportation. Pt only attended 5 follow up sessions following her evaluation in early August. She remains limited by multiple medical issues. PT was able to have her MD write orders for a R carbon fiber AFO, but PT is unsure if pt ever pursued this.        Discharge reason : Non-Compliance with attendance, Pt has not re-scheduled further follow-up sessions, and POC has     PLAN   This patient is discharged from Outpatient Physical Therapy Services.     Stephon Strong, PT  2022

## 2023-01-09 ENCOUNTER — TELEPHONE (OUTPATIENT)
Dept: PRIMARY CARE CLINIC | Facility: CLINIC | Age: 53
End: 2023-01-09
Payer: MEDICAID

## 2023-01-09 NOTE — TELEPHONE ENCOUNTER
----- Message from Darcie Good sent at 1/9/2023 12:20 PM CST -----  Contact: self/[phone]  Pt called in regard to getting a Rx for fioricet      [facility] [place] [address]  [address] [place] [address]  Phone: [phone] Fax: [phone]    Please advise

## 2023-02-09 RX ORDER — CYCLOBENZAPRINE HCL 10 MG
TABLET ORAL
Qty: 30 TABLET | Refills: 5 | Status: SHIPPED | OUTPATIENT
Start: 2023-02-09 | End: 2023-04-26

## 2023-02-09 NOTE — TELEPHONE ENCOUNTER
----- Message from Elizabeth Ho sent at 2/9/2023  7:41 AM CST -----  Contact: Self/[phone]  Pt said that she is calling in regards to needing to get a return call from the nurse pt stated that she needs a refill on a medication that she takes for her legs pt stated that she does not know the name but it is a peach colored pill also pt wants to know if her Gabapentin can be increased to 800 mg Please advise

## 2023-02-09 NOTE — TELEPHONE ENCOUNTER
Returned call to patient in regards to message. Patient was requesting medication refill. Patient was also asking for an increase on Gabapentin. can you please fill.

## 2023-02-09 NOTE — TELEPHONE ENCOUNTER
No new care gaps identified.  [facility] Embedded Care Gaps. Reference number: [id number]. 2/09/2023   11:17:43 AM CST

## 2023-03-02 ENCOUNTER — TELEPHONE (OUTPATIENT)
Dept: PRIMARY CARE CLINIC | Facility: CLINIC | Age: 53
End: 2023-03-02
Payer: MEDICAID

## 2023-03-02 NOTE — TELEPHONE ENCOUNTER
----- Message from Jenn Norton sent at 3/2/2023  2:39 PM CST -----  Contact: Patient, [phone]  Calling because she has a burning sensation on her waist every couple of days. Please advise. Thanks.

## 2023-04-21 ENCOUNTER — PATIENT MESSAGE (OUTPATIENT)
Dept: ADMINISTRATIVE | Facility: HOSPITAL | Age: 53
End: 2023-04-21
Payer: MEDICAID

## 2023-04-26 RX ORDER — CYCLOBENZAPRINE HCL 10 MG
TABLET ORAL
Qty: 30 TABLET | Refills: 5 | Status: SHIPPED | OUTPATIENT
Start: 2023-04-26 | End: 2024-02-20

## 2023-04-26 NOTE — TELEPHONE ENCOUNTER
No new care gaps identified.  [facility] Embedded Care Gaps. Reference number: [id number]. 4/26/2023   10:42:13 AM OLGAT

## 2023-05-15 RX ORDER — BUTALBITAL, ACETAMINOPHEN AND CAFFEINE 50; 325; 40 MG/1; MG/1; MG/1
TABLET ORAL
Qty: 30 TABLET | Refills: 2 | Status: SHIPPED | OUTPATIENT
Start: 2023-05-15 | End: 2023-09-11 | Stop reason: SDUPTHER

## 2023-05-15 NOTE — TELEPHONE ENCOUNTER
----- Message from Hayley S Ismael sent at 5/15/2023  3:02 PM CDT -----  Contact: Pt Mobile [phone]  Requesting an RX refill or new RX.  Is this a refill or new RX: Refill  RX name and strength   butalbital-acetaminophen-caffeine -40 mg (FIORICET, ESGIC) -40 mg per tablet  Is this a 30 day or 90 day RX: 30  Pharmacy name and phone # [facility] [place] - [address] [place] [address]  Phone: [phone] Fax: [phone]  The doctors have asked that we provide their patients with the following 2 reminders -- prescription refills can take up to 72 hours, and a friendly reminder that in the future you can use your [facility] account to request refills:      
Care Due:                  Date            Visit Type   Department     Provider  --------------------------------------------------------------------------------                                LÓPEZ      [facility]  Last Visit: 11-      AUDIO ONLY   PRIMARY CARE   Orestes Hancock  Next Visit: None Scheduled  None         None Found                                                            Last  Test          Frequency    Reason                     Performed    Due Date  --------------------------------------------------------------------------------    CMP.........  12 months..  furosemide, losartan.....  08- 08-    [facility] Embedded Care Due Messages. Reference number: [id number].   5/15/2023 3:08:04 PM CDT  
electronic

## 2023-06-15 ENCOUNTER — TELEPHONE (OUTPATIENT)
Dept: PRIMARY CARE CLINIC | Facility: CLINIC | Age: 53
End: 2023-06-15
Payer: MEDICAID

## 2023-06-15 NOTE — TELEPHONE ENCOUNTER
----- Message from Nuris Edwards sent at 6/14/2023  4:59 PM CDT -----  Regarding: PT ADVICE  Contact: PT  Pt called regarding scheduling appt. Next available is 8.9.23, but pt declined and would like to be seen sooner. Also, pt stated she needs a refill on her meds for her headaches.    Please advise. Pt can be reached at [phone]

## 2023-07-06 RX ORDER — ERGOCALCIFEROL 1.25 MG/1
CAPSULE ORAL
Qty: 12 CAPSULE | Refills: 3 | Status: SHIPPED | OUTPATIENT
Start: 2023-07-06

## 2023-07-07 ENCOUNTER — TELEPHONE (OUTPATIENT)
Dept: PRIMARY CARE CLINIC | Facility: CLINIC | Age: 53
End: 2023-07-07
Payer: MEDICAID

## 2023-07-07 NOTE — TELEPHONE ENCOUNTER
Called patient in regards to message, patient requested an earlier appointment. Patient was reschedule for an earlier appointment. Patient verbalized understand.

## 2023-07-07 NOTE — TELEPHONE ENCOUNTER
----- Message from Hayley Guevara sent at 7/7/2023  3:20 PM CDT -----  Contact: Mobile [phone]  Caller is requesting an earlier appointment then we can schedule.  Caller is requesting a message be sent to the provider.  If this is for urgent care symptoms, did you offer other providers at this location, providers at other locations, or [facility]? (yes, no, n/a):  N/A  If this is for the patients physical, did you offer to schedule next available and put on wait list, or to see NP or PA for their physical?  (yes, no, n/a):  Yes  When is the next available appointment with their provider:  08/21/2023.  Reason for the appointment:  Depression  Patient preference of timeframe to be scheduled:  As soon as possible.  Would the patient like a call back, or a response through their [facility] portal?:   Call

## 2023-07-10 RX ORDER — GABAPENTIN 600 MG/1
TABLET ORAL
Qty: 90 TABLET | Refills: 2 | Status: SHIPPED | OUTPATIENT
Start: 2023-07-10 | End: 2023-09-11

## 2023-07-10 NOTE — TELEPHONE ENCOUNTER
No care due was identified.  [facility] Embedded Care Due Messages. Reference number: [id number].   7/10/2023 8:04:25 AM CDT

## 2023-07-17 ENCOUNTER — PATIENT OUTREACH (OUTPATIENT)
Dept: ADMINISTRATIVE | Facility: HOSPITAL | Age: 53
End: 2023-07-17
Payer: MEDICAID

## 2023-07-17 NOTE — PROGRESS NOTES
Health Maintenance Due   Topic Date Due    Cervical Cancer Screening  Never done    COVID-19 Vaccine (1) Never done    Pneumococcal Vaccines (Age 0-64) (1 - PCV) Never done    Hemoglobin A1c (Diabetic Prevention Screening)  Never done    Colorectal Cancer Screening  Never done    Shingles Vaccine (1 of 2) Never done        Chart review done.    updated.   Immunizations reviewed & updated.   Care Everywhere updated.   [facility] reviewed

## 2023-08-09 ENCOUNTER — TELEPHONE (OUTPATIENT)
Dept: PRIMARY CARE CLINIC | Facility: CLINIC | Age: 53
End: 2023-08-09
Payer: MEDICAID

## 2023-08-09 NOTE — TELEPHONE ENCOUNTER
----- Message from Kelly Elder sent at 8/9/2023  1:58 PM CDT -----  Contact: [phone]  .1 Patient would like to get medical advice.  Symptoms (please be specific):abscess   How long has patient had these symptoms:  Pharmacy name and phone#:[facility] - [place], [place] - [address] Phone:  [phone]  Fax:  [phone]  Any drug allergies: on file  Comments: Patient would like to get medical advice. Patient is asking for something to be call to the pharmacy, patient stated that her tooth pain is bad. Thank you

## 2023-09-05 RX ORDER — MELOXICAM 7.5 MG/1
TABLET ORAL
Qty: 60 TABLET | Refills: 5 | Status: SHIPPED | OUTPATIENT
Start: 2023-09-05 | End: 2024-02-26

## 2023-09-08 ENCOUNTER — TELEPHONE (OUTPATIENT)
Dept: PRIMARY CARE CLINIC | Facility: CLINIC | Age: 53
End: 2023-09-08
Payer: MEDICAID

## 2023-09-08 NOTE — TELEPHONE ENCOUNTER
Called patient regarding message. Patient needed medication refill, patient was schedule for a virtual appointment on Monday. Patient verbalized understand.

## 2023-09-08 NOTE — TELEPHONE ENCOUNTER
----- Message from Eren Dalton MA sent at 9/7/2023  5:02 PM CDT -----  Contact: PATIENT [phone]    ----- Message -----  From: Leelee Rey  Sent: 9/7/2023   3:57 PM CDT  To: Santi Carlisle Staff    Requesting an RX refill or new RX.  Is this a refill or new RX:   RX name and strength butalbital-acetaminophen-caffeine -40 mg (FIORICET, ESGIC) -40 mg per tablet  Is this a 30 day or 90 day RX:   Pharmacy name and phone #   [facility] - [place], [place] - [address] [place] [address]  Phone: [phone] Fax: [phone]      Requesting an RX refill or new RX.  Is this a refill or new RX:   RX name and strength cyclobenzaprine (FLEXERIL) 10 MG tablet  Is this a 30 day or 90 day RX:   Pharmacy name and phone #  [facility] - [place], [place] - [address] [place] [address]  Phone: [phone] Fax: [phone]      comment: NEED UP TO 30 MG

## 2023-09-11 ENCOUNTER — NURSE TRIAGE (OUTPATIENT)
Dept: ADMINISTRATIVE | Facility: CLINIC | Age: 53
End: 2023-09-11
Payer: MEDICAID

## 2023-09-11 ENCOUNTER — OFFICE VISIT (OUTPATIENT)
Dept: PRIMARY CARE CLINIC | Facility: CLINIC | Age: 53
End: 2023-09-11
Payer: MEDICAID

## 2023-09-11 DIAGNOSIS — F41.9 ANXIETY: ICD-10-CM

## 2023-09-11 DIAGNOSIS — E53.8 FOLIC ACID DEFICIENCY: ICD-10-CM

## 2023-09-11 DIAGNOSIS — G44.209 TENSION-TYPE HEADACHE, NOT INTRACTABLE, UNSPECIFIED CHRONICITY PATTERN: ICD-10-CM

## 2023-09-11 DIAGNOSIS — J98.01 BRONCHOSPASM: ICD-10-CM

## 2023-09-11 DIAGNOSIS — K76.0 FATTY LIVER: ICD-10-CM

## 2023-09-11 DIAGNOSIS — E55.9 VITAMIN D DEFICIENCY: ICD-10-CM

## 2023-09-11 DIAGNOSIS — M21.371 FOOT DROP, RIGHT: ICD-10-CM

## 2023-09-11 DIAGNOSIS — G62.9 POLYNEUROPATHY: Primary | ICD-10-CM

## 2023-09-11 PROCEDURE — 4010F PR ACE/ARB THEARPY RXD/TAKEN: ICD-10-PCS | Mod: CPTII,95,, | Performed by: FAMILY MEDICINE

## 2023-09-11 PROCEDURE — 4010F ACE/ARB THERAPY RXD/TAKEN: CPT | Mod: CPTII,95,, | Performed by: FAMILY MEDICINE

## 2023-09-11 PROCEDURE — 99214 PR OFFICE/OUTPT VISIT, EST, LEVL IV, 30-39 MIN: ICD-10-PCS | Mod: 95,,, | Performed by: FAMILY MEDICINE

## 2023-09-11 PROCEDURE — 99214 OFFICE O/P EST MOD 30 MIN: CPT | Mod: 95,,, | Performed by: FAMILY MEDICINE

## 2023-09-11 RX ORDER — TIZANIDINE 4 MG/1
4 TABLET ORAL EVERY 8 HOURS
Qty: 30 TABLET | Refills: 0 | Status: SHIPPED | OUTPATIENT
Start: 2023-09-11 | End: 2023-09-21

## 2023-09-11 RX ORDER — CITALOPRAM 10 MG/1
10 TABLET ORAL DAILY
Qty: 30 TABLET | Refills: 11 | Status: SHIPPED | OUTPATIENT
Start: 2023-09-11 | End: 2024-09-10

## 2023-09-11 RX ORDER — BUTALBITAL, ACETAMINOPHEN AND CAFFEINE 50; 325; 40 MG/1; MG/1; MG/1
TABLET ORAL
Qty: 30 TABLET | Refills: 2 | Status: SHIPPED | OUTPATIENT
Start: 2023-09-11 | End: 2023-11-30

## 2023-09-11 RX ORDER — GABAPENTIN 600 MG/1
600 TABLET ORAL 3 TIMES DAILY
Qty: 120 TABLET | Refills: 5 | Status: SHIPPED | OUTPATIENT
Start: 2023-09-11 | End: 2023-10-03

## 2023-09-11 NOTE — PROGRESS NOTES
Subjective:       Patient ID: Camila Adan is a 52 y.o. female.    Chief Complaint: No chief complaint on file.      HPI: Virtual Video Telehealth Visit     The patient location is:  Home  The chief complaint leading to consultation is:  Multiple medical issues anxiety depression chronic pain syndrome menopausal syndrome  Visit type: Virtual visit  Total time spent with patient:  30 minutes     Each patient to whom I provide medical services by telemedicine is:  (1) informed of the relationship between the physician and patient and the respective role of any other health care provider with respect to management of the patient; and (2) notified that they may decline to receive medical services by telemedicine and may withdraw from such care at any time. Patient verbally consented to receive this service via voice-only telephone call.       HPI:  See history of present illness above     Assessment and plan:  See plan above  52-year-old white female --patient on Flexeril taking 3 at a time--patient on meloxicam was taking 5 out of time with no relief--patient on gabapentin increase dose to 600 mg 2 p.o. b.i.d.  Patient having problems with headaches needs refills Fioricet--pounding sensation had off and on  History of hypertension on losartan  History of osteoporosis on Fosamax  History B12 on B12  History vitamin-D deficiency on vitamin-D 51141 units              Skin: no psoriasis, eczema, skin cancer-  HEENT: + headache,no  ocular pain, blurred vision, diplopia, epistaxis, hoarseness change in voice, thyroid trouble  Lung: No pneumonia, asthma, Tb, wheezing, SOB, smoking half pack per day  Heart: No chest pain, +ankle edema--were swollen other day--just left ankle , no  palpitations, MI, shane murmur, +hypertension, hyperlipidemia  Abdomen: No nausea,no vomiting   no  diarrhea, constipation, ulcers, hepatitis, gallbladder disease, melena, hematochezia, hematemesis  : no UTI, renal disease,  stones  GYN LMP possibly going through menopause lots of hot flashes  MS: no fractures, O/A, lupus, rheumatoid, gout--bilateral leg pain left much greater than right--the bilateral polyneuropathy--contusion right great toe--right foot drop--  Neuro: No dizziness, LOC, seizures   No diabetes, no anemia, no anxiety, + depression--gain 25 lb  Single lives with roommate--3 children--work- unemployed  lives with roommate      Objective:   Physical Exam:    General: Well nourished, well developed, no acute distress +obesity  Skin:  Ecchymosis right chest wall 9 x 6 and 0.5 cm  HEENT: Eyes PERRLA, EOM intact, nose patent, throat non-erythematous ears  left TM slightly injected  NECK: Supple, no bruits, No JVD, no nodes  Lungs: Clear, no rales, rhonchi, wheezing  Heart: Regular rate and rhythm, no murmurs, gallops, or rubs  Abdomen: flat, bowel sounds positive, no tenderness, or organomegaly  MS: very poor gait --widen base feet--shuffles feet--very hard for pt get on exam table --despite step up--has right foot drop-- can't dorsiflex foot   Neuro: Alert, CN intact, oriented X 3  Extremities: No cyanosis, clubbing, or edema         Assessment:       1. Polyneuropathy    2. Foot drop, right    3. Tension-type headache, not intractable, unspecified chronicity pattern    4. Bronchospasm    5. Vitamin D deficiency    6. Folic acid deficiency    7. Fatty liver    8. Anxiety            Plan:       Polyneuropathy  -     WALKER FOR HOME USE  -     Ambulatory referral/consult to General Surgery; Future; Expected date: 09/18/2023  -     Ambulatory referral/consult to Physical/Occupational Therapy; Future; Expected date: 09/18/2023    Foot drop, right    Tension-type headache, not intractable, unspecified chronicity pattern    Bronchospasm    Vitamin D deficiency  -     Vitamin D; Future; Expected date: 09/11/2023    Folic acid deficiency  -     Folate; Future; Expected date: 09/11/2023    Fatty liver    Anxiety  -     CBC Auto  Differential; Future; Expected date: 09/11/2023  -     Comprehensive Metabolic Panel; Future; Expected date: 09/11/2023  -     Hemoglobin A1C; Future; Expected date: 09/11/2023  -     Lipid Panel; Future; Expected date: 09/11/2023  -     T4, Free; Future; Expected date: 09/11/2023  -     TSH; Future; Expected date: 09/11/2023  -     Ambulatory referral/consult to Psychiatry; Future; Expected date: 09/18/2023    Other orders  -     citalopram (CELEXA) 10 MG tablet; Take 1 tablet (10 mg total) by mouth once daily.  Dispense: 30 tablet; Refill: 11  -     gabapentin (NEURONTIN) 600 MG tablet; Take 1 tablet (600 mg total) by mouth 3 (three) times daily.  Dispense: 120 tablet; Refill: 5  -     tiZANidine (ZANAFLEX) 4 MG tablet; Take 1 tablet (4 mg total) by mouth every 8 (eight) hours. for 10 days  Dispense: 30 tablet; Refill: 0  -     butalbital-acetaminophen-caffeine -40 mg (FIORICET, ESGIC) -40 mg per tablet; TAKE ONE TABLET BY MOUTH ONCE DAILY AS NEEDED FOR HEADACHE  Dispense: 30 tablet; Refill: 2            Main Reason for Visit-  Weight gain--patient sleeps most of the day---has gained proximally 3250 lb now about 270 lb would like weight loss surgery general surgery consulted Dr. Jones who see to evaluate--patient states so heavy having difficulty walking  Rollator walker--patient's wore later walkers been broken every written for another 1 if insurance will pay for it  Physical therapy patient has difficulty ambulating getting physical therapy and memory would like to have physical therapy and shower med order placed for physical therapy needs to be at [facility]  Depression patient upset because all she does is sit in the house all day needs to see psychiatrist will start on Lexapro 10 mg 1 p.o. q.d.  History of headaches on Fioricet p.r.n.  History of neuropathy on gabapentin patient increase gabapentin from 600 b.i.d. to 2 600s twice a day told maximum doses 2400 mg will allow to take that dose but  isn't maximum dose  Patient being helped with Flexeril but taking 5 at a time will switch to Zanaflex 4 mg 1 p.o. q.8 hours p.r.n. muscle spasm  History of menopausal syndrome wants hormone therapy patient should try healthy woman with soy or estradiol OTC medications  Hypertension patient doing well on losartan 50 mg  History elevated liver function tests sees hepatologist       Numbness feet to just below knees --  Has headache times 10 days see history of present illness--tried OTC medicines no relief--Fioricet 1 p.o. 1-2 times per day p.r.n. headache--patient needs to do headache diary  Patient had high blood pressure during physical therapy will add Cozaar 50 mg 1 p.o. q.d.  Other medical issues  Neuropathy unable walk except with cane or walker better--fantasma with foot drop--PT suggest AFO --rolling walker --pt states does much better with that kind walker PT wants pt get one   Morbid Obesity needs lose weight   Falls --right leg gives out--but both legs get weak and shake---MRI add lumbar spine was denied--x-ray shows spondylosis with narrowing of the disc spaces facet arthropathy osteophytes  +elevated rheumatoid factor   Lab CBCs CMP lipids T4 TSH folic acid level

## 2023-09-12 NOTE — TELEPHONE ENCOUNTER
Pt calling in, already spoke with PCP before call back. No further needs at this time. Advised to call back with further concerns.  Reason for Disposition   Caller has already spoken with the PCP and has no further questions.    Protocols used: No Contact or Duplicate Contact Call-A-AH

## 2023-09-18 ENCOUNTER — PATIENT MESSAGE (OUTPATIENT)
Dept: PRIMARY CARE CLINIC | Facility: CLINIC | Age: 53
End: 2023-09-18
Payer: MEDICAID

## 2023-09-25 RX ORDER — POTASSIUM CHLORIDE 750 MG/1
CAPSULE, EXTENDED RELEASE ORAL
Qty: 90 CAPSULE | Refills: 3 | Status: SHIPPED | OUTPATIENT
Start: 2023-09-25

## 2023-09-25 NOTE — TELEPHONE ENCOUNTER
Refill Routing Note   Medication(s) are not appropriate for processing by [facility] for the following reason(s):      Required labs outdated    [facility] action(s):  Defer Care Due:  Appointment due  Labs due            Appointments  past 12m or future 3m with PCP    Date Provider   Last Visit   9/11/2023 Orestes Hancock MD   Next Visit   Visit date not found Orestes Hancock MD   ED visits in past 90 days: 0        Note composed:5:52 PM 09/25/2023

## 2023-10-02 ENCOUNTER — TELEPHONE (OUTPATIENT)
Dept: PRIMARY CARE CLINIC | Facility: CLINIC | Age: 53
End: 2023-10-02
Payer: MEDICAID

## 2023-10-02 DIAGNOSIS — R29.898 RIGHT LEG WEAKNESS: Primary | ICD-10-CM

## 2023-10-02 NOTE — TELEPHONE ENCOUNTER
----- Message from Denise Crocker sent at 9/30/2023  2:45 PM CDT -----  Regarding: Questions and concerns  Contact: [phone]  Type:  Needs Medical Advice    Who Called: Camila  Symptoms (please be specific): Patient cant stand to take showers  Would the patient rather a call back or a response via [facility]? Call  Best Call Back Number: [phone]  Additional Information: Patient would like to speak with the office in ref to ordering her a chair for her shower.

## 2023-10-03 RX ORDER — GABAPENTIN 600 MG/1
600 TABLET ORAL 3 TIMES DAILY
Qty: 90 TABLET | Refills: 2 | Status: SHIPPED | OUTPATIENT
Start: 2023-10-03 | End: 2024-02-20

## 2023-10-03 NOTE — TELEPHONE ENCOUNTER
No care due was identified.  [facility] Embedded Care Due Messages. Reference number: [id number].   10/03/2023 8:05:16 AM CDT

## 2023-10-06 ENCOUNTER — TELEPHONE (OUTPATIENT)
Dept: PRIMARY CARE CLINIC | Facility: CLINIC | Age: 53
End: 2023-10-06

## 2023-10-06 ENCOUNTER — PATIENT MESSAGE (OUTPATIENT)
Dept: ADMINISTRATIVE | Facility: HOSPITAL | Age: 53
End: 2023-10-06
Payer: MEDICAID

## 2023-10-06 ENCOUNTER — PATIENT OUTREACH (OUTPATIENT)
Dept: ADMINISTRATIVE | Facility: HOSPITAL | Age: 53
End: 2023-10-06
Payer: MEDICAID

## 2023-10-06 NOTE — TELEPHONE ENCOUNTER
Called patient regarding message. Patient said that she having nasal issues and her nasal spray isn't working.  wanted to know if you can call her in something for all her symptoms. I informed the patient that Dr. Hancock is currently out of the office patient said that she still wanted to see if you can call in medication for her.

## 2023-10-06 NOTE — PROGRESS NOTES
Health Maintenance Due   Topic Date Due    Cervical Cancer Screening  Never done    COVID-19 Vaccine (1) Never done    Pneumococcal Vaccines (Age 0-64) (1 - PCV) Never done    Hemoglobin A1c (Diabetic Prevention Screening)  Never done    Colorectal Cancer Screening  Never done    Shingles Vaccine (1 of 2) Never done    Influenza Vaccine (1) Never done    Mammogram  11/17/2023     Immunizations - reviewed and updated   Care Everywhere - triggered   Care Teams - updated   Outreach - Patient overdue for cervical cancer screening, portal message sent to patient.

## 2023-10-09 RX ORDER — PREDNISONE 20 MG/1
20 TABLET ORAL DAILY
Qty: 7 TABLET | Refills: 0 | Status: SHIPPED | OUTPATIENT
Start: 2023-10-09 | End: 2023-10-16

## 2023-10-09 RX ORDER — AZITHROMYCIN 250 MG/1
TABLET, FILM COATED ORAL
Qty: 6 TABLET | Refills: 0 | Status: SHIPPED | OUTPATIENT
Start: 2023-10-09 | End: 2023-10-13

## 2023-10-13 ENCOUNTER — PATIENT MESSAGE (OUTPATIENT)
Dept: ADMINISTRATIVE | Facility: HOSPITAL | Age: 53
End: 2023-10-13
Payer: MEDICAID

## 2023-10-18 ENCOUNTER — PATIENT MESSAGE (OUTPATIENT)
Dept: CARDIOLOGY | Facility: CLINIC | Age: 53
End: 2023-10-18
Payer: MEDICAID

## 2023-10-28 NOTE — TELEPHONE ENCOUNTER
Care Due:                  Date            Visit Type   Department     Provider  --------------------------------------------------------------------------------                                LÓPEZ      [facility]  Last Visit: 11-      AUDIO ONLY   PRIMARY CARE   Orestes Hancock  Next Visit: None Scheduled  None         None Found                                                            Last  Test          Frequency    Reason                     Performed    Due Date  --------------------------------------------------------------------------------    CBC.........  12 months..  meloxicam................  08- 08-    Mg Level....  12 months..  alendronate..............  Not Found    Overdue    Phosphate...  12 months..  alendronate..............  Not Found    Overdue    Vitamin D...  12 months..  alendronate..............  Not Found    Overdue    [facility] Embedded Care Due Messages. Reference number: [id number].   10/28/2023 8:01:22 AM CDT

## 2023-10-30 NOTE — TELEPHONE ENCOUNTER
Refill Routing Note   Medication(s) are not appropriate for processing by [facility] for the following reason(s):      Required labs outdated  Required vitals outdated    [facility] action(s):  Defer Care Due:  Labs due          Appointments  past 12m or future 3m with PCP    Date Provider   Last Visit   9/11/2023 Orestes Hancock MD   Next Visit   Visit date not found Orestes Hancock MD   ED visits in past 90 days: 0        Note composed:9:34 PM 10/29/2023

## 2023-10-31 RX ORDER — LOSARTAN POTASSIUM 50 MG/1
50 TABLET ORAL
Qty: 90 TABLET | Refills: 3 | Status: SHIPPED | OUTPATIENT
Start: 2023-10-31

## 2023-11-15 ENCOUNTER — TELEPHONE (OUTPATIENT)
Dept: PRIMARY CARE CLINIC | Facility: CLINIC | Age: 53
End: 2023-11-15
Payer: MEDICAID

## 2023-11-15 DIAGNOSIS — Z12.31 ENCOUNTER FOR SCREENING MAMMOGRAM FOR MALIGNANT NEOPLASM OF BREAST: Primary | ICD-10-CM

## 2023-11-15 RX ORDER — CYCLOBENZAPRINE HCL 10 MG
TABLET ORAL
Qty: 30 TABLET | Refills: 5 | OUTPATIENT
Start: 2023-11-15

## 2023-11-15 NOTE — TELEPHONE ENCOUNTER
No care due was identified.  [facility] Embedded Care Due Messages. Reference number: [id number].   11/15/2023 3:07:36 PM CST

## 2023-11-15 NOTE — TELEPHONE ENCOUNTER
----- Message from Araceli Haskins sent at 11/15/2023  3:16 PM CST -----  Contact: [phone]  Caller is requesting to schedule their annual screening mammogram appointment. Order is not listed in Epic.  Please enter order and contact patient to schedule.  Would the patient like a call back, or a response through their [facility] portal?:   CALL BACK

## 2023-11-15 NOTE — TELEPHONE ENCOUNTER
----- Message from Araceli Haskins sent at 11/15/2023  3:16 PM CST -----  Contact: [phone]  Caller is requesting to schedule their annual screening mammogram appointment. Order is not listed in Epic.  Please enter order and contact patient to schedule.  Would the patient like a call back, or a response through their [facility] portal?:   CALL BACK

## 2023-11-29 NOTE — TELEPHONE ENCOUNTER
Care Due:                  Date            Visit Type   Department     Provider  --------------------------------------------------------------------------------                                LÓPEZ      [facility]  Last Visit: 11-      AUDIO ONLY   PRIMARY CARE   Orestes Hancock  Next Visit: None Scheduled  None         None Found                                                            Last  Test          Frequency    Reason                     Performed    Due Date  --------------------------------------------------------------------------------    Office Visit  12 months..  alendronate, citalopram,   11- 11-                             furosemide, losartan,                             meloxicam................    CMP.........  12 months..  alendronate, furosemide,   08- 08-                             losartan, meloxicam......    [facility] Embedded Care Due Messages. Reference number: [id number].   11/29/2023 8:04:54 AM CST

## 2023-11-30 RX ORDER — BUTALBITAL, ACETAMINOPHEN AND CAFFEINE 50; 325; 40 MG/1; MG/1; MG/1
TABLET ORAL
Qty: 30 TABLET | Refills: 2 | Status: SHIPPED | OUTPATIENT
Start: 2023-11-30 | End: 2024-02-26

## 2023-12-04 ENCOUNTER — TELEPHONE (OUTPATIENT)
Dept: SURGERY | Facility: CLINIC | Age: 53
End: 2023-12-04
Payer: MEDICAID

## 2023-12-04 NOTE — TELEPHONE ENCOUNTER
Spoke with patient. Stated she was not aware she had an appointment with the provider. Patient rescheduled. Appointment set for her to speak with financial counselor for medical weight loss. Informed she will need to speak with them first to be seen for weight loss. Verbalized understanding. No further issues discussed.

## 2023-12-04 NOTE — TELEPHONE ENCOUNTER
----- Message from Kellykristen Elder sent at 12/4/2023  9:01 AM CST -----  Contact: [phone]  Caller is requesting an earlier appointment then we can schedule.  Caller is requesting a message be sent to the provider.  If this is for urgent care symptoms, did you offer other providers at this location, providers at other locations, or [facility]? (yes, no, n/a):  n/a  If this is for the patients physical, did you offer to schedule next available and put on wait list, or to see NP or PA for their physical?  (yes, no, n/a):  n/a  When is the next available appointment with their provider:  12/04/23  Reason for the appointment:  weight loss  Patient preference of timeframe to be scheduled:  The week of   12/11/23  Would the patient like a call back, or a response through their [facility] portal?:   phone  Comments:

## 2023-12-05 ENCOUNTER — HOSPITAL ENCOUNTER (OUTPATIENT)
Dept: RADIOLOGY | Facility: HOSPITAL | Age: 53
Discharge: HOME | End: 2023-12-05
Attending: FAMILY MEDICINE
Payer: MEDICAID

## 2023-12-05 ENCOUNTER — TELEPHONE (OUTPATIENT)
Dept: SURGERY | Facility: CLINIC | Age: 53
End: 2023-12-05
Payer: MEDICAID

## 2023-12-05 DIAGNOSIS — Z12.31 ENCOUNTER FOR SCREENING MAMMOGRAM FOR MALIGNANT NEOPLASM OF BREAST: ICD-10-CM

## 2023-12-05 PROCEDURE — 77067 SCR MAMMO BI INCL CAD: CPT | Mod: 26,,, | Performed by: RADIOLOGY

## 2023-12-05 PROCEDURE — 77067 MAMMO DIGITAL SCREENING BILAT WITH TOMO: ICD-10-PCS | Mod: 26,,, | Performed by: RADIOLOGY

## 2023-12-05 PROCEDURE — 77063 BREAST TOMOSYNTHESIS BI: CPT | Mod: 26,,, | Performed by: RADIOLOGY

## 2023-12-05 PROCEDURE — 77063 MAMMO DIGITAL SCREENING BILAT WITH TOMO: ICD-10-PCS | Mod: 26,,, | Performed by: RADIOLOGY

## 2023-12-05 PROCEDURE — 77067 SCR MAMMO BI INCL CAD: CPT | Mod: TC

## 2023-12-11 RX ORDER — CYCLOBENZAPRINE HCL 10 MG
TABLET ORAL
Qty: 30 TABLET | Refills: 5 | OUTPATIENT
Start: 2023-12-11

## 2023-12-11 NOTE — TELEPHONE ENCOUNTER
No care due was identified.  [facility] Embedded Care Due Messages. Reference number: [id number].   12/11/2023 3:07:35 PM CST

## 2023-12-11 NOTE — TELEPHONE ENCOUNTER
----- Message from Jenn Errol sent at 12/11/2023  3:04 PM CST -----  Contact: Patient, [phone]  Requesting an RX refill or new RX.  Is this a refill or new RX: Refill  RX name and strength (copy/paste from chart):  cyclobenzaprine (FLEXERIL) 10 MG tablet  Is this a 30 day or 90 day RX:   Pharmacy name and phone # (copy/paste from chart):    [facility] - [address] [place] [address]  Phone: [phone] Fax: [phone]  The doctors have asked that we provide their patients with the following 2 reminders -- prescription refills can take up to 72 hours, and a friendly reminder that in the future you can use your [facility] account to request refills: Yes

## 2023-12-11 NOTE — TELEPHONE ENCOUNTER
Called patient and informed her she needs an appointment. Patient was schedule for a virtual visit today.

## 2023-12-12 ENCOUNTER — TELEPHONE (OUTPATIENT)
Dept: PRIMARY CARE CLINIC | Facility: CLINIC | Age: 53
End: 2023-12-12
Payer: MEDICAID

## 2023-12-12 NOTE — TELEPHONE ENCOUNTER
----- Message from Tristan Calvin sent at 12/12/2023  1:07 PM CST -----  Contact: Pt  [phone]  Requesting an RX refill or new RX.  Is this a refill or new RX: refill  RX name and strength (copy/paste from chart):  tiZANidine (ZANAFLEX) 4 MG tablet  Is this a 30 day or 90 day RX:   Pharmacy name and phone # (copy/paste from chart): [facility] - [place], [place] - [address]   Phone: [phone]  Fax: [phone]         The doctors have asked that we provide their patients with the following 2 reminders -- prescription refills can take up to 72 hours, and a friendly reminder that in the future you can use your [facility] account to request refills: yes    Pt states she was logged on for her virtual visit the  never joined the call

## 2023-12-13 RX ORDER — TIZANIDINE 4 MG/1
TABLET ORAL
Qty: 90 TABLET | Refills: 1 | Status: SHIPPED | OUTPATIENT
Start: 2023-12-13 | End: 2024-01-29

## 2023-12-28 ENCOUNTER — TELEPHONE (OUTPATIENT)
Dept: PRIMARY CARE CLINIC | Facility: CLINIC | Age: 53
End: 2023-12-28
Payer: MEDICAID

## 2023-12-28 DIAGNOSIS — Z74.09 IMPAIRED FUNCTIONAL MOBILITY, BALANCE, GAIT, AND ENDURANCE: Primary | ICD-10-CM

## 2023-12-28 NOTE — TELEPHONE ENCOUNTER
----- Message from Fabián Montiel sent at 12/28/2023  3:08 PM CST -----  Contact: [phone]@patient  Good afternoon patient would like a call back to discuss her getting a order for a different cane. Please give patient a call back [phone]

## 2023-12-28 NOTE — TELEPHONE ENCOUNTER
Pt states that she wants to try a straight cane because the quad is getting hard for her to use. Pt states that she feels that her legs is getting  a little stronger and she often uses her broom to walk around because its more comfortable

## 2024-01-22 ENCOUNTER — TELEPHONE (OUTPATIENT)
Dept: PRIMARY CARE CLINIC | Facility: CLINIC | Age: 54
End: 2024-01-22
Payer: MEDICAID

## 2024-01-22 NOTE — TELEPHONE ENCOUNTER
Called and spoke with patient,  Notified her that the order was sent to [facility].  Patient verbalized understanding.  Also I have added patient to the wait list because next available appointment that she was able to get is not until next month.

## 2024-01-22 NOTE — TELEPHONE ENCOUNTER
----- Message from Abimbola Fletcher sent at 1/22/2024 11:25 AM CST -----  Contact: self  [phone]  Pt requesting a call for status of cecilia she requested.    Please call and advise

## 2024-01-22 NOTE — TELEPHONE ENCOUNTER
----- Message from Abimbola Fletcher sent at 1/22/2024 11:25 AM CST -----  Contact: self  [phone]  Pt requesting a call for status of cecilia she requested.    Please call and advise

## 2024-01-26 NOTE — TELEPHONE ENCOUNTER
Care Due:                  Date            Visit Type   Department     Provider  --------------------------------------------------------------------------------                                VIRTUAL      [facility]  Last Visit: 11-      AUDIO ONLY   PRIMARY CARE   Orestes Hancock                               -                              PRIMARY      [facility]  Next Visit: 02-      CARE ([facility])   PRIMARY CARE   Orestes Hancock                                                            Last  Test          Frequency    Reason                     Performed    Due Date  --------------------------------------------------------------------------------    CBC.........  12 months..  meloxicam................  08- 08-    Mg Level....  12 months..  alendronate..............  Not Found    Overdue    Phosphate...  12 months..  alendronate..............  Not Found    Overdue    Vitamin D...  12 months..  alendronate..............  Not Found    Overdue    [employer] Embedded Care Due Messages. Reference number: [id number].   1/26/2024 8:04:13 AM CST

## 2024-01-28 RX ORDER — ALENDRONATE SODIUM 70 MG/1
TABLET ORAL
Qty: 12 TABLET | Refills: 3 | Status: SHIPPED | OUTPATIENT
Start: 2024-01-28

## 2024-01-29 RX ORDER — TIZANIDINE 4 MG/1
TABLET ORAL
Qty: 90 TABLET | Refills: 1 | Status: SHIPPED | OUTPATIENT
Start: 2024-01-29 | End: 2024-03-26

## 2024-01-29 NOTE — TELEPHONE ENCOUNTER
Care Due:                  Date            Visit Type   Department     Provider  --------------------------------------------------------------------------------                                VIRTUAL      [facility]  Last Visit: 11-      AUDIO ONLY   PRIMARY CARE   Orestes Hancock                               -                              PRIMARY      [facility]  Next Visit: 02-      CARE ([facility])   PRIMARY CARE   Orestes Hancock                                                            Last  Test          Frequency    Reason                     Performed    Due Date  --------------------------------------------------------------------------------    CMP.........  12 months..  alendronate, furosemide,   08- 08-                             losartan, meloxicam......    [employer] Embedded Care Due Messages. Reference number: [id number].   1/29/2024 8:01:52 AM CST

## 2024-02-15 ENCOUNTER — TELEPHONE (OUTPATIENT)
Dept: PRIMARY CARE CLINIC | Facility: CLINIC | Age: 54
End: 2024-02-15
Payer: MEDICAID

## 2024-02-15 NOTE — TELEPHONE ENCOUNTER
Patient called in asking if you can write a prescription for someone to come out and fix her walker. Can you please submit the Rx so I can fax the order to the company

## 2024-02-15 NOTE — TELEPHONE ENCOUNTER
----- Message from Kacy Cabrera sent at 2/15/2024  4:03 PM CST -----  Contact: Pt [phone]  Please fax an order for them to come to her house to fix her walker [employer] [phone]    Thank you

## 2024-02-15 NOTE — TELEPHONE ENCOUNTER
Refax pt referrals to [facility] again.   Called pt and informed pt that I sent it via fax and via epic

## 2024-02-15 NOTE — TELEPHONE ENCOUNTER
----- Message from Jenn Norton sent at 2/15/2024  2:50 PM CST -----  Contact: Patient, [phone]  Calling because she still has not received the walking cane. [facility] told her they did not receive the order. Please advise. Thanks.

## 2024-02-19 NOTE — TELEPHONE ENCOUNTER
"Called patient and informed her of provider message. Patient said that the name of the company is "[employer]" can you please write the prescription for this company. Patient also have a visit on tomorrow with you 02/20/2024. Thank you.  "

## 2024-02-20 ENCOUNTER — OFFICE VISIT (OUTPATIENT)
Dept: PRIMARY CARE CLINIC | Facility: CLINIC | Age: 54
End: 2024-02-20
Payer: MEDICAID

## 2024-02-20 ENCOUNTER — DOCUMENTATION ONLY (OUTPATIENT)
Dept: SURGERY | Facility: CLINIC | Age: 54
End: 2024-02-20
Payer: MEDICAID

## 2024-02-20 VITALS
WEIGHT: 235.13 LBS | OXYGEN SATURATION: 99 % | SYSTOLIC BLOOD PRESSURE: 134 MMHG | RESPIRATION RATE: 18 BRPM | HEART RATE: 77 BPM | HEIGHT: 66 IN | BODY MASS INDEX: 37.79 KG/M2 | DIASTOLIC BLOOD PRESSURE: 86 MMHG

## 2024-02-20 DIAGNOSIS — Z13.1 SCREENING FOR DIABETES MELLITUS: ICD-10-CM

## 2024-02-20 DIAGNOSIS — E53.8 FOLIC ACID DEFICIENCY: ICD-10-CM

## 2024-02-20 DIAGNOSIS — S50.862A INSECT BITE OF LEFT FOREARM, INITIAL ENCOUNTER: ICD-10-CM

## 2024-02-20 DIAGNOSIS — Z12.11 SPECIAL SCREENING FOR MALIGNANT NEOPLASM OF COLON: ICD-10-CM

## 2024-02-20 DIAGNOSIS — R22.42 LOCALIZED SWELLING OF LEFT LOWER LEG: ICD-10-CM

## 2024-02-20 DIAGNOSIS — K76.0 FATTY LIVER: ICD-10-CM

## 2024-02-20 DIAGNOSIS — E55.9 VITAMIN D DEFICIENCY: ICD-10-CM

## 2024-02-20 DIAGNOSIS — G44.209 TENSION-TYPE HEADACHE, NOT INTRACTABLE, UNSPECIFIED CHRONICITY PATTERN: ICD-10-CM

## 2024-02-20 DIAGNOSIS — G62.9 POLYNEUROPATHY: Primary | ICD-10-CM
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
## 2024-02-20 DIAGNOSIS — M21.371 FOOT DROP, RIGHT: ICD-10-CM

## 2024-02-20 DIAGNOSIS — R29.898 RIGHT LEG WEAKNESS: ICD-10-CM

## 2024-02-20 PROCEDURE — 99999 PR PBB SHADOW E&M-EST. PATIENT-LVL V: CPT | Mod: PBBFAC,,, | Performed by: FAMILY MEDICINE

## 2024-02-20 PROCEDURE — 3075F SYST BP GE 130 - 139MM HG: CPT | Mod: CPTII,,, | Performed by: FAMILY MEDICINE

## 2024-02-20 PROCEDURE — 1159F MED LIST DOCD IN RCRD: CPT | Mod: CPTII,,, | Performed by: FAMILY MEDICINE

## 2024-02-20 PROCEDURE — 4010F ACE/ARB THERAPY RXD/TAKEN: CPT | Mod: CPTII,,, | Performed by: FAMILY MEDICINE

## 2024-02-20 PROCEDURE — 99214 OFFICE O/P EST MOD 30 MIN: CPT | Mod: S$PBB,,, | Performed by: FAMILY MEDICINE

## 2024-02-20 PROCEDURE — 3079F DIAST BP 80-89 MM HG: CPT | Mod: CPTII,,, | Performed by: FAMILY MEDICINE

## 2024-02-20 PROCEDURE — 3008F BODY MASS INDEX DOCD: CPT | Mod: CPTII,,, | Performed by: FAMILY MEDICINE

## 2024-02-20 PROCEDURE — 99215 OFFICE O/P EST HI 40 MIN: CPT | Mod: PBBFAC,PN | Performed by: FAMILY MEDICINE

## 2024-02-20 RX ORDER — SODIUM, POTASSIUM,MAG SULFATES 17.5-3.13G
1 SOLUTION, RECONSTITUTED, ORAL ORAL DAILY
Qty: 1 KIT | Refills: 0 | Status: SHIPPED | OUTPATIENT
Start: 2024-02-20 | End: 2024-02-22

## 2024-02-20 RX ORDER — CYCLOBENZAPRINE HCL 10 MG
TABLET ORAL
Qty: 30 TABLET | Refills: 5 | Status: SHIPPED | OUTPATIENT
Start: 2024-02-20

## 2024-02-20 RX ORDER — DICLOFENAC SODIUM 75 MG/1
TABLET, DELAYED RELEASE ORAL
Qty: 60 TABLET | Refills: 5 | Status: SHIPPED | OUTPATIENT
Start: 2024-02-20

## 2024-02-20 RX ORDER — GABAPENTIN 800 MG/1
800 TABLET ORAL 3 TIMES DAILY
Qty: 90 TABLET | Refills: 5 | Status: SHIPPED | OUTPATIENT
Start: 2024-02-20 | End: 2025-02-19

## 2024-02-20 RX ORDER — BETAMETHASONE VALERATE 1 MG/G
CREAM TOPICAL 2 TIMES DAILY
Qty: 60 G | Refills: 2 | Status: SHIPPED | OUTPATIENT
Start: 2024-02-20 | End: 2024-05-07

## 2024-02-20 NOTE — PROGRESS NOTES
This patient arrived at the clinic as a walk in patient, post visit with the Primary Care Physician,  requesting to be scheduled for a Colonoscopy. A review of the patient's chart,denotes a case request  to schedule the patient to have a Colonoscopy for colon cancer screening. The patient verbally consented to be scheduled to have the Colonoscopy on 03/06/2024. The patient was given a detailed explanation of the Colonoscopy prep instructions, along with a copy of the associated bowel prep instructions. The patient acknowledged understanding of the prep instructions, and was give an opportunity to ask any questions about the Colonoscopy procedure, and the associated prep instructions.

## 2024-02-20 NOTE — PROGRESS NOTES
Subjective:       Patient ID: Camila Adan is a 53 y.o. female.    Chief Complaint: Routine Check Up  and Medication Management      HPI: 54 yo WF --complaining possible bedbug bites stated a friend's house had lesions on left forearm 10-12 and also had bites on the lower extremities will try Valisone cream patient worried about scarring  Patient has stopped drinking in stops smoking now weighs 233  Problems with left leg swelling x 2 days no history deep vein thrombosis  History of hypertension blood pressure 134/86 patient on losartan  History of osteoporosis on Fosamax  History B12 deficiency on B12  History vitamin-D deficiency on vitamin-D  Patient with depression on Celexa  Hx HA on frioricet --always on the left temporal--quality--stabbing--severity 8/10--frequency--2-3 times per week--duration 15 minutes if rubs temporal  History neuropathy with footdrop--on gabapentin--some ford=gling finger tips wants increase gabapenti --also in Mobic in 10 as needed wants to try a different medication in increase gabapentin              Skin: no psoriasis, eczema, skin cancer-insect bites   HEENT: + headache alot has not seen neurologist ,no  ocular pain, blurred vision, diplopia, epistaxis, hoarseness change in voice, thyroid trouble  Lung: No pneumonia, asthma, Tb, wheezing, SOB, smoking half pack per day  Heart: No chest pain, +ankle edema--were swollen other day--just left ankle , no  palpitations, MI, shane murmur, +hypertension, hyperlipidemia  Abdomen: No nausea,no vomiting   no  diarrhea, constipation, ulcers, hepatitis, gallbladder disease, melena, hematochezia, hematemesis  : no UTI, renal disease, stones  GYN LMP possibly going through menopause lots of hot flashes  MS: no fractures, O/A, lupus, rheumatoid, gout--bilateral leg pain left much greater than right--the bilateral polyneuropathy--contusion right great toe--right foot drop--  Neuro: No dizziness, LOC, seizures   No diabetes, no  anemia, no anxiety, + depression--gain 25 lb  Single lives with roommate--3 children--work- unemployed  lives with roommate      Objective:   Physical Exam:    General: Well nourished, well developed, no acute distress +obesity  Skin:  Insect bites left forearm times 10-12 superficial healing well will treat with Valisone  YUSEF T: Eyes PERRLA, EOM intact, nose patent, throat non-erythematous ears  left TM slightly injected  NECK: Supple, no bruits, No JVD, no nodes  Lungs: Clear, no rales, rhonchi, wheezing  Heart: Regular rate and rhythm, no murmurs, gallops, or rubs  Abdomen: flat, bowel sounds positive, no tenderness, or organomegaly  MS: very poor gait --widen base feet--shuffles feet--very hard for pt get on exam table --despite step up--has right foot drop-- can't dorsiflex foot   Neuro: Alert, CN intact, oriented X 3  Extremities: No cyanosis, clubbing, edema left lower leg --to calf --neg abram's neg calf tenderness but is swollen        Assessment:       1. Polyneuropathy    2. Tension-type headache, not intractable, unspecified chronicity pattern    3. Special screening for malignant neoplasm of colon    4. Screening for diabetes mellitus    5. Insect bite of left forearm, initial encounter    6. Localized swelling of left lower leg    7. Foot drop, right    8. Vitamin D deficiency    9. Folic acid deficiency    10. Fatty liver    11. Right leg weakness            Plan:       Polyneuropathy  -     Ambulatory referral/consult to Neurology; Future; Expected date: 02/27/2024    Tension-type headache, not intractable, unspecified chronicity pattern  -     Ambulatory referral/consult to Neurology; Future; Expected date: 02/27/2024    Special screening for malignant neoplasm of colon  -     Case Request Endoscopy: COLONOSCOPY    Screening for diabetes mellitus  -     Hemoglobin A1C; Future; Expected date: 02/20/2024    Insect bite of left forearm, initial encounter    Localized swelling of left lower leg  -      US Lower Extremity Veins Left; Future; Expected date: 02/20/2024    Foot drop, right    Vitamin D deficiency    Folic acid deficiency    Fatty liver    Right leg weakness    Other orders  -     betamethasone valerate 0.1% (VALISONE) 0.1 % Crea; Apply topically 2 (two) times daily.  Dispense: 60 g; Refill: 2  -     diclofenac (VOLTAREN) 75 MG EC tablet; 1 p.o. b.i.d. for leg pain do not take with any other NSAIDs can take with Tylenol  Dispense: 60 tablet; Refill: 5  -     cyclobenzaprine (FLEXERIL) 10 MG tablet; 1 p.o. q.8 hours p.r.n. muscle spasm  Dispense: 30 tablet; Refill: 5  -     gabapentin (NEURONTIN) 800 MG tablet; Take 1 tablet (800 mg total) by mouth 3 (three) times daily.  Dispense: 90 tablet; Refill: 5            Main Reason for Visit-  Weight gain--233---quit drinking and quit smoking  Swelling left leg--will rule out deep vein thrombosis--ultrasound left lower extremity  Insect bites left forearm patient with staying friend's house thinks been by bedbugs  Rollator walker--patient's wore later walkers been broken needs a note so they will come to her house and fix her Rollator walker  Still with problems with neuropathy in the legs--will switch meloxicam to diclofenac--Zanaflex to Flexeril--increase gabapentin 800 mg t.i.d.--have patient see neurologist for headaches and for neuropathy   Physical therapy patient has difficulty ambulating getting physical therapy and memory would like to have physical therapy and shower med order placed for physical therapy needs to be at [facility] med  Depression patient upset because all she does is sit in the house all day --on Celexa  History of headaches on Fioricet p.r.n--needs to see neurologist possible MRI the brain  History of menopausal syndrome wants hormone therapy patient should try healthy woman with soy or estradiol OTC medications  Hypertension patient doing well on losartan 50 mg  History elevated liver function tests sees hepatologist   Needs do  lab as in computer CBCs CMP lipids thyroid vitamin-D folic acid Valisone      Numbness feet to just below knees --  Has headache times 10 days see history of present illness--tried OTC medicines no relief--Fioricet 1 p.o. 1-2 times per day p.r.n. headache--patient needs to do headache diary  Patient had high blood pressure during physical therapy will add Cozaar 50 mg 1 p.o. q.d.  Other medical issues  Neuropathy unable walk except with cane or walker better--fantasma with foot drop--PT suggest AFO --rolling walker --pt states does much better with that kind walker PT wants pt get one   Morbid Obesity needs lose weight   Falls --right leg gives out--but both legs get weak and shake---MRI add lumbar spine was denied--x-ray shows spondylosis with narrowing of the disc spaces facet arthropathy osteophytes  +elevated rheumatoid factor   Lab CBCs CMP lipids T4 TSH folic acid level

## 2024-02-20 NOTE — TELEPHONE ENCOUNTER
The patient has been advised the Colonoscopy Prep Kit will be ordered from the patient's verified preferred pharmacy on file. The medication can  be picked up by the patient, or the patient's designated representative.The patient was further explained the Colonoscopy Prep instructions will be mailed to the patient verified mailing address on file, or put onto the [employer] portal, whichever method of delivery the patient prefers.Additionally this patient was informed,not to follow the instructions that comes with the bowel prep medication. However, the patient was instructed to please follow the Colonoscopy Bowel Prep instructions that's being provided by the . The patient was asked to please to follow the Colonoscopy Prep instructions being provided as precisely,and  meticulously as possible.The patient was advised you  will receive a follow up phone call to summarize the Colonoscopy Prep instructions prior to the scheduled Colonoscopy procedure date. At this time the patient will be given an opportunity to ask any questions regarding the Colonoscopy procedure, and it's associated Bowel Prep instructions.

## 2024-02-21 NOTE — TELEPHONE ENCOUNTER
Patient was seen yesterday and a note was given to her for rollator repair. I have also faxed it to [facility].

## 2024-02-26 RX ORDER — MELOXICAM 7.5 MG/1
TABLET ORAL
Qty: 60 TABLET | Refills: 5 | Status: SHIPPED | OUTPATIENT
Start: 2024-02-26 | End: 2024-06-10

## 2024-02-26 RX ORDER — BUTALBITAL, ACETAMINOPHEN AND CAFFEINE 50; 325; 40 MG/1; MG/1; MG/1
TABLET ORAL
Qty: 30 TABLET | Refills: 2 | Status: SHIPPED | OUTPATIENT
Start: 2024-02-26 | End: 2024-06-10 | Stop reason: SDUPTHER

## 2024-02-26 NOTE — TELEPHONE ENCOUNTER
No care due was identified.  [facility] Embedded Care Due Messages. Reference number: [id number].   2/26/2024 8:05:08 AM CST

## 2024-02-27 ENCOUNTER — TELEPHONE (OUTPATIENT)
Dept: PRIMARY CARE CLINIC | Facility: CLINIC | Age: 54
End: 2024-02-27
Payer: MEDICAID

## 2024-02-27 NOTE — TELEPHONE ENCOUNTER
----- Message from Jenn Norton sent at 2/27/2024  8:54 AM CST -----  Contact: Patient, [phone]  [email or web address]     Patient is calling for Medical Advice regarding: Possible food poisoning    How long has patient had these symptoms: Last night    Pharmacy name and phone#:   [facility] - [place], [place] - [address]  [address] [place] [address]  Phone: [phone] Fax: [phone]       Would like response via [employer]:  Call back    Comments: Tried to connect to Oncall nurse, but line was busy. Please call the patient. Thanks.

## 2024-02-27 NOTE — TELEPHONE ENCOUNTER
----- Message from Fabián Montiel sent at 2/27/2024  9:12 AM CST -----  Contact: [phone]@patient  Good morning patient would like a  call back to discuss the doc calling her in some med. Patient says she has diarrhea,vomiting,and thinks she may have food poison. Aundrea give patient a  call back  [phone]

## 2024-03-08 RX ORDER — CYANOCOBALAMIN 1000 UG/ML
INJECTION, SOLUTION INTRAMUSCULAR; SUBCUTANEOUS
Qty: 10 ML | Refills: 5 | Status: SHIPPED | OUTPATIENT
Start: 2024-03-08

## 2024-03-25 NOTE — TELEPHONE ENCOUNTER
No care due was identified.  Health [facility] Embedded Care Due Messages. Reference number: [id number].   3/25/2024 8:03:31 AM CDT

## 2024-03-26 RX ORDER — TIZANIDINE 4 MG/1
TABLET ORAL
Qty: 90 TABLET | Refills: 3 | Status: SHIPPED | OUTPATIENT
Start: 2024-03-26 | End: 2024-06-10

## 2024-05-07 RX ORDER — BETAMETHASONE VALERATE 1 MG/G
1 CREAM TOPICAL 2 TIMES DAILY
Qty: 60 G | Refills: 2 | Status: SHIPPED | OUTPATIENT
Start: 2024-05-07

## 2024-05-07 NOTE — TELEPHONE ENCOUNTER
Refill Routing Note   Medication(s) are not appropriate for processing by [facility] for the following reason(s):        New or recently adjusted medication    [facility] action(s):  Defer        Medication Therapy Plan: New start (2/20/24)      Appointments  past 12m or future 3m with PCP    Date Provider   Last Visit   2/20/2024 Orestes Hancock MD   Next Visit   8/20/2024 Orestes Hancock MD   ED visits in past 90 days: 0        Note composed:11:22 AM 05/07/2024

## 2024-05-07 NOTE — TELEPHONE ENCOUNTER
Care Due:                  Date            Visit Type   Department     Provider  --------------------------------------------------------------------------------                                EP -                              PRIMARY [facility]  Last Visit: 02-      CARE ([facility])   PRIMARY CARE   Orestes Hancock                              EP - PRIMARY [facility]  Next Visit: 08-      CARE ([facility])   [facility]   Orestes Hancock                                                            Last  Test          Frequency    Reason                     Performed    Due Date  --------------------------------------------------------------------------------    Mg Level....  12 months..  alendronate..............  Not Found    Overdue    Phosphate...  12 months..  alendronate..............  Not Found    Overdue    Vitamin D...  12 months..  alendronate,               Not Found    Overdue                             ergocalciferol...........    Health [facility] Embedded Care Due Messages. Reference number: [id number].   5/07/2024 8:03:09 AM CDT

## 2024-05-27 ENCOUNTER — PATIENT OUTREACH (OUTPATIENT)
Dept: ADMINISTRATIVE | Facility: HOSPITAL | Age: 54
End: 2024-05-27
Payer: MEDICAID

## 2024-05-27 NOTE — PROGRESS NOTES
Health Maintenance Due   Topic Date Due    Cervical Cancer Screening  Never done    Pneumococcal Vaccines (Age 0-64) (1 of 2 - PCV) Never done    Shingles Vaccine (1 of 2) Never done    COVID-19 Vaccine (1 - 2023-24 season) Never done        Chart review done.    updated.   Immunizations reviewed & updated.   Care Everywhere updated.  [facility] reviewed

## 2024-05-28 RX ORDER — BUTALBITAL, ACETAMINOPHEN AND CAFFEINE 50; 325; 40 MG/1; MG/1; MG/1
TABLET ORAL
Qty: 30 TABLET | Refills: 2 | OUTPATIENT
Start: 2024-05-28

## 2024-05-28 NOTE — TELEPHONE ENCOUNTER
No care due was identified.  Health [facility] Embedded Care Due Messages. Reference number: [id number].   5/28/2024 8:04:07 AM CDT

## 2024-05-31 NOTE — TELEPHONE ENCOUNTER
No care due was identified.  Health [facility] Embedded Care Due Messages. Reference number: [id number].   5/31/2024 12:17:46 PM CDT

## 2024-06-01 RX ORDER — BUTALBITAL, ACETAMINOPHEN AND CAFFEINE 50; 325; 40 MG/1; MG/1; MG/1
TABLET ORAL
Qty: 30 TABLET | Refills: 2 | OUTPATIENT
Start: 2024-06-01

## 2024-06-10 ENCOUNTER — OFFICE VISIT (OUTPATIENT)
Dept: PRIMARY CARE CLINIC | Facility: CLINIC | Age: 54
End: 2024-06-10
Payer: MEDICAID

## 2024-06-10 VITALS
RESPIRATION RATE: 18 BRPM | HEART RATE: 99 BPM | WEIGHT: 238.75 LBS | HEIGHT: 66 IN | OXYGEN SATURATION: 94 % | SYSTOLIC BLOOD PRESSURE: 138 MMHG | BODY MASS INDEX: 38.37 KG/M2 | DIASTOLIC BLOOD PRESSURE: 88 MMHG

## 2024-06-10 DIAGNOSIS — R23.2 HOT FLASHES: ICD-10-CM

## 2024-06-10 DIAGNOSIS — G62.9 NEUROPATHY: ICD-10-CM

## 2024-06-10 DIAGNOSIS — L92.9 GRANULATION TISSUE: ICD-10-CM

## 2024-06-10 DIAGNOSIS — R51.9 NONINTRACTABLE EPISODIC HEADACHE, UNSPECIFIED HEADACHE TYPE: ICD-10-CM

## 2024-06-10 DIAGNOSIS — H66.92 LEFT OTITIS MEDIA, UNSPECIFIED OTITIS MEDIA TYPE: ICD-10-CM

## 2024-06-10 DIAGNOSIS — R60.9 EDEMA, UNSPECIFIED TYPE: Primary | ICD-10-CM

## 2024-06-10 DIAGNOSIS — R06.09 DYSPNEA ON EXERTION: ICD-10-CM

## 2024-06-10 PROCEDURE — 1159F MED LIST DOCD IN RCRD: CPT | Mod: CPTII,,, | Performed by: STUDENT IN AN ORGANIZED HEALTH CARE EDUCATION/TRAINING PROGRAM

## 2024-06-10 PROCEDURE — 99214 OFFICE O/P EST MOD 30 MIN: CPT | Mod: S$PBB,,, | Performed by: STUDENT IN AN ORGANIZED HEALTH CARE EDUCATION/TRAINING PROGRAM

## 2024-06-10 PROCEDURE — 3044F HG A1C LEVEL LT 7.0%: CPT | Mod: CPTII,,, | Performed by: STUDENT IN AN ORGANIZED HEALTH CARE EDUCATION/TRAINING PROGRAM

## 2024-06-10 PROCEDURE — 3075F SYST BP GE 130 - 139MM HG: CPT | Mod: CPTII,,, | Performed by: STUDENT IN AN ORGANIZED HEALTH CARE EDUCATION/TRAINING PROGRAM

## 2024-06-10 PROCEDURE — 1160F RVW MEDS BY RX/DR IN RCRD: CPT | Mod: CPTII,,, | Performed by: STUDENT IN AN ORGANIZED HEALTH CARE EDUCATION/TRAINING PROGRAM

## 2024-06-10 PROCEDURE — 3008F BODY MASS INDEX DOCD: CPT | Mod: CPTII,,, | Performed by: STUDENT IN AN ORGANIZED HEALTH CARE EDUCATION/TRAINING PROGRAM

## 2024-06-10 PROCEDURE — 4010F ACE/ARB THERAPY RXD/TAKEN: CPT | Mod: CPTII,,, | Performed by: STUDENT IN AN ORGANIZED HEALTH CARE EDUCATION/TRAINING PROGRAM

## 2024-06-10 PROCEDURE — 99214 OFFICE O/P EST MOD 30 MIN: CPT | Mod: PBBFAC,PN | Performed by: STUDENT IN AN ORGANIZED HEALTH CARE EDUCATION/TRAINING PROGRAM

## 2024-06-10 PROCEDURE — 99999 PR PBB SHADOW E&M-EST. PATIENT-LVL IV: CPT | Mod: PBBFAC,,, | Performed by: STUDENT IN AN ORGANIZED HEALTH CARE EDUCATION/TRAINING PROGRAM

## 2024-06-10 PROCEDURE — 3079F DIAST BP 80-89 MM HG: CPT | Mod: CPTII,,, | Performed by: STUDENT IN AN ORGANIZED HEALTH CARE EDUCATION/TRAINING PROGRAM

## 2024-06-10 RX ORDER — ALBUTEROL SULFATE 90 UG/1
2 AEROSOL, METERED RESPIRATORY (INHALATION) EVERY 6 HOURS PRN
Qty: 54 G | Refills: 3 | Status: SHIPPED | OUTPATIENT
Start: 2024-06-10

## 2024-06-10 RX ORDER — SUMATRIPTAN 50 MG/1
50 TABLET, FILM COATED ORAL
Qty: 16 TABLET | Refills: 0 | Status: SHIPPED | OUTPATIENT
Start: 2024-06-10 | End: 2024-07-10

## 2024-06-10 RX ORDER — BUTALBITAL, ACETAMINOPHEN AND CAFFEINE 50; 325; 40 MG/1; MG/1; MG/1
TABLET ORAL
Qty: 30 TABLET | Refills: 2 | Status: SHIPPED | OUTPATIENT
Start: 2024-06-10

## 2024-06-10 RX ORDER — AMOXICILLIN AND CLAVULANATE POTASSIUM 875; 125 MG/1; MG/1
1 TABLET, FILM COATED ORAL EVERY 12 HOURS
Qty: 20 TABLET | Refills: 0 | Status: SHIPPED | OUTPATIENT
Start: 2024-06-10 | End: 2024-06-20

## 2024-06-10 RX ORDER — DULOXETIN HYDROCHLORIDE 60 MG/1
60 CAPSULE, DELAYED RELEASE ORAL DAILY
Qty: 30 CAPSULE | Refills: 1 | Status: SHIPPED | OUTPATIENT
Start: 2024-06-10 | End: 2025-06-10

## 2024-06-10 NOTE — PROGRESS NOTES
Subjective:       Patient ID: Camila Adan is a 53 y.o. female.    Chief Complaint: Joint Swelling (Ankle and feet swelling), Otalgia (With ear drainage), and Wrist Pain (Bilateral wrist pain)    HPI:  53 y.o. female presents to [facility] with complaints of ankle and feet swelling    Patient reports left sided foot swelling that occurred 2 weeks ago. Lasted about 3 days. Couldn't get slippers on. Swelling resolved without intervention. No trauma.    Recurrent?: Yes  New medications?: None  Better with elevation/in morning?: Didn't initially, nights 3-4 resolved  Pain?: No  Sodium intake?: Possible seafood  Carbohydrate loads?: None  Palpitations?: No  Restrictive clothing?: No  Snoring/stop breathing?: Never told, not waking to catch breath. Never told looks like stops breathing in sleep.    Has never had anemia in the past.  Can walk about 1 block and feels short of breath. Sleeps with 2 pillows.    Patient is on gabapentin currently for neuropathy in bilateral leg. Stopped meloxicam as it is not helping with pain.    Manic symptoms when explained?: No    Patient reprots left ear ear pain with drainage that began a couple weeks ago. Had dark drainage that has stopped since onset. When using Q-tip, still has some dark cerumen.    Sick contacts?: No  Fever?: No  Shortness of breath?: No  Loss of taste smell?: No  Received flu vaccine?: No  Received COVID vaccine?: No; has COVID in past    Smoked for 32 years up to 1ppd    Review of Systems   Constitutional:  Negative for chills, diaphoresis, fatigue and fever.   HENT:  Negative for congestion, sinus pressure, sneezing and sore throat.    Respiratory:  Positive for shortness of breath (Needing inhaler a few times per week. Almost out). Negative for cough.    Cardiovascular:  Positive for leg swelling. Negative for chest pain and palpitations.   Gastrointestinal:  Negative for abdominal pain, diarrhea, nausea and vomiting.   Musculoskeletal:  Positive for  "arthralgias (Chronic bilateral wrist).   Skin:  Negative for rash and wound.   Neurological:  Positive for numbness. Negative for dizziness and light-headedness.       Objective:      Vitals:    06/10/24 1312   BP: 138/88   BP Location: Right arm   Patient Position: Sitting   BP Method: Medium (Manual)   Pulse: 99   Resp: 18   SpO2: (!) 94%   Weight: 108.3 kg (238 lb 12.1 oz)   Height: 5' 6" (1.676 m)     Physical Exam  Vitals reviewed.   Constitutional:       General: She is not in acute distress.     Appearance: Normal appearance. She is not ill-appearing.   HENT:      Head: Normocephalic and atraumatic.      Mouth/Throat:      Mouth: Mucous membranes are moist.      Pharynx: Oropharynx is clear.   Eyes:      General:         Right eye: No discharge.         Left eye: No discharge.   Cardiovascular:      Rate and Rhythm: Normal rate and regular rhythm.      Pulses: Normal pulses.      Heart sounds: No murmur heard.  Pulmonary:      Effort: Pulmonary effort is normal.      Breath sounds: Normal breath sounds.   Abdominal:      Palpations: Abdomen is soft.      Tenderness: There is no abdominal tenderness.   Musculoskeletal:         General: No deformity.   Skin:     General: Skin is warm and dry.      Coloration: Skin is not jaundiced.   Neurological:      General: No focal deficit present.      Mental Status: She is alert and oriented to person, place, and time.   Psychiatric:         Mood and Affect: Mood normal.         Behavior: Behavior normal.             Lab Results   Component Value Date     02/20/2024    K 4.4 02/20/2024     02/20/2024    CO2 27 02/20/2024    BUN 12 02/20/2024    CREATININE 0.8 02/20/2024    ANIONGAP 8 02/20/2024     Lab Results   Component Value Date    HGBA1C 5.5 02/20/2024     No results found for: "BNP", "BNPTRIAGEBLO"    Lab Results   Component Value Date    WBC 9.01 02/20/2024    HGB 12.7 02/20/2024    HCT 38.5 02/20/2024     02/20/2024    GRAN 5.8 02/20/2024    " GRAN 64.5 02/20/2024     Lab Results   Component Value Date    CHOL 215 (H) 05/11/2021    HDL 49 05/11/2021    LDLCALC 140.0 05/11/2021    TRIG 130 05/11/2021          Current Outpatient Medications:     alendronate (FOSAMAX) 70 MG tablet, TAKE ONE TABLET BY MOUTH EVERY 7 DAYS, Disp: 12 tablet, Rfl: 3    betamethasone valerate 0.1% (VALISONE) 0.1 % Crea, APPLY TOPICALLY TWICE DAILY, Disp: 60 g, Rfl: 2    citalopram (CELEXA) 10 MG tablet, Take 1 tablet (10 mg total) by mouth once daily., Disp: 30 tablet, Rfl: 11    cyanocobalamin 1,000 mcg/mL injection, inject ONE ML under the skin every WEEK FOR 10 WEEKS THEN ONE ML monthly, Disp: 10 mL, Rfl: 5    cyclobenzaprine (FLEXERIL) 10 MG tablet, 1 p.o. q.8 hours p.r.n. muscle spasm, Disp: 30 tablet, Rfl: 5    diclofenac (VOLTAREN) 75 MG EC tablet, 1 p.o. b.i.d. for leg pain do not take with any other NSAIDs can take with Tylenol, Disp: 60 tablet, Rfl: 5    ergocalciferol (ERGOCALCIFEROL) 50,000 unit Cap, TAKE ONE CAPSULE BY MOUTH every SEVEN DAYS, Disp: 12 capsule, Rfl: 3    gabapentin (NEURONTIN) 800 MG tablet, Take 1 tablet (800 mg total) by mouth 3 (three) times daily., Disp: 90 tablet, Rfl: 5    IRON-C-FOLATE 5-T75-FB-STOMACH ORAL, Take by mouth., Disp: , Rfl:     potassium chloride (MICRO-K) 10 MEQ CpSR, TAKE ONE CAPSULE BY MOUTH DAILY, Disp: 90 capsule, Rfl: 3    (Magic mouthwash) 1:1:1 diphenhydrAMINE(Benadryl) 12.5mg/5ml liq, aluminum & magnesium hydroxide-simethicone (Maalox), LIDOcaine viscous 2%, Swish and spit every 4-6 hours as needed for mouth sores, Disp: 240 mL, Rfl: 0    albuterol (PROVENTIL/VENTOLIN HFA) 90 mcg/actuation inhaler, Inhale 2 puffs into the lungs every 6 (six) hours as needed for Wheezing. Rescue, Disp: 54 g, Rfl: 3    amoxicillin-clavulanate 875-125mg (AUGMENTIN) 875-125 mg per tablet, Take 1 tablet by mouth every 12 (twelve) hours. for 10 days, Disp: 20 tablet, Rfl: 0    butalbital-acetaminophen-caffeine -40 mg (FIORICET, ESGIC)  -40 mg per tablet, TAKE ONE TABLET BY MOUTH ONCE DAILY FOR HEADACHE, Disp: 30 tablet, Rfl: 2    DULoxetine (CYMBALTA) 60 MG capsule, Take 1 capsule (60 mg total) by mouth once daily., Disp: 30 capsule, Rfl: 1    losartan (COZAAR) 50 MG tablet, TAKE ONE TABLET BY MOUTH ONCE DAILY (Patient not taking: Reported on 6/10/2024), Disp: 90 tablet, Rfl: 3    sumatriptan (IMITREX) 50 MG tablet, Take 1 tablet (50 mg total) by mouth every 2 (two) hours as needed for Migraine. Do not exceed 4 tablets (200 mg) within a 24 hour period., Disp: 16 tablet, Rfl: 0  No current facility-administered medications for this visit.    Facility-Administered Medications Ordered in Other Visits:     0.9%  NaCl infusion, , Intravenous, Continuous, Delbert Luis MD        Assessment:       1. Edema, unspecified type    2. Hot flashes    3. Dyspnea on exertion    4. Nonintractable episodic headache, unspecified headache type    5. Left otitis media, unspecified otitis media type    6. Granulation tissue           Plan:       Edema, unspecified type  Neuropathy  -     TSH; Future; Expected date: 06/10/2024  -     Urinalysis; Future; Expected date: 06/10/2024  -     NT-Pro Natriuretic Peptide; Future; Expected date: 06/10/2024  -     DULoxetine (CYMBALTA) 60 MG capsule; Take 1 capsule (60 mg total) by mouth once daily.  Dispense: 30 capsule; Refill: 1  - Will assess for organic causes of swelling  - Keep follow-up with Dr. Hancock to assess efficacy of duloxetine for neuropathy, informed of need for 4 weeks trial to know efficacy of medication    Hot flashes  -     ESTRADIOL; Future; Expected date: 06/10/2024    Dyspnea on exertion  -     albuterol (PROVENTIL/VENTOLIN HFA) 90 mcg/actuation inhaler; Inhale 2 puffs into the lungs every 6 (six) hours as needed for Wheezing. Rescue  Dispense: 54 g; Refill: 3  -     Complete PFT with bronchodilator; Future  -     PULSE OXIMETRY WITH REST - PULM; Future  - Concerns for COPD with smoking  Hx. Will order PFTs    Nonintractable episodic headache, unspecified headache type  -     butalbital-acetaminophen-caffeine -40 mg (FIORICET, ESGIC) -40 mg per tablet; TAKE ONE TABLET BY MOUTH ONCE DAILY FOR HEADACHE  Dispense: 30 tablet; Refill: 2  -     sumatriptan (IMITREX) 50 MG tablet; Take 1 tablet (50 mg total) by mouth every 2 (two) hours as needed for Migraine. Do not exceed 4 tablets (200 mg) within a 24 hour period.  Dispense: 16 tablet; Refill: 0  - With unilateral headaches, will attempt trial of migraine abortant sumatriptan at this time. Possible benefit from prophylactic medication in future    Left otitis media, unspecified otitis media type  -     amoxicillin-clavulanate 875-125mg (AUGMENTIN) 875-125 mg per tablet; Take 1 tablet by mouth every 12 (twelve) hours. for 10 days  Dispense: 20 tablet; Refill: 0  - Recommend avoid q-tips use    Granulation tissue  -     (Magic mouthwash) 1:1:1 diphenhydrAMINE(Benadryl) 12.5mg/5ml liq, aluminum & magnesium hydroxide-simethicone (Maalox), LIDOcaine viscous 2%; Swish and spit every 4-6 hours as needed for mouth sores  Dispense: 240 mL; Refill: 0    RTC PRN

## 2024-06-16 NOTE — TELEPHONE ENCOUNTER
No care due was identified.  [facility] Embedded Care Due Messages. Reference number: [id number].   6/16/2024 8:01:55 AM CDT

## 2024-06-17 RX ORDER — ERGOCALCIFEROL 1.25 MG/1
50000 CAPSULE ORAL
Qty: 3 CAPSULE | Refills: 3 | Status: SHIPPED | OUTPATIENT
Start: 2024-06-17

## 2024-06-25 RX ORDER — CYCLOBENZAPRINE HCL 10 MG
TABLET ORAL
Qty: 30 TABLET | Refills: 5 | OUTPATIENT
Start: 2024-06-25

## 2024-06-25 NOTE — TELEPHONE ENCOUNTER
No care due was identified.  [facility] Embedded Care Due Messages. Reference number: [id number].   6/25/2024 12:28:21 PM CDT

## 2024-07-15 RX ORDER — TIZANIDINE 4 MG/1
TABLET ORAL
Qty: 90 TABLET | Refills: 3 | Status: SHIPPED | OUTPATIENT
Start: 2024-07-15

## 2024-07-15 NOTE — TELEPHONE ENCOUNTER
Care Due:                  Date            Visit Type   Department     Provider  --------------------------------------------------------------------------------                                EP - [facility]IMARY [facility]  Last Visit: 06-      CARE ([facility])   PRIMARY CARE   Luis M Carreno                              EP - [facility]IMARY [facility]  Next Visit: 08-      CARE ([facility])   PRIMARY CARE   Orestes Hancock                                                            Last  Test          Frequency    Reason                     Performed    Due Date  --------------------------------------------------------------------------------    Mg Level....  12 months..  alendronate..............  Not Found    Overdue    Phosphate...  12 months..  alendronate..............  Not Found    Overdue    Vitamin D...  12 months..  alendronate,               Not Found    Overdue                             ergocalciferol...........    [employer] Embedded Care Due Messages. Reference number: [id number].   7/15/2024 8:03:57 AM CDT

## 2024-07-28 DIAGNOSIS — R23.2 HOT FLASHES: ICD-10-CM

## 2024-07-28 DIAGNOSIS — G62.9 NEUROPATHY: ICD-10-CM

## 2024-07-28 NOTE — TELEPHONE ENCOUNTER
No care due was identified.  Health [facility] Embedded Care Due Messages. Reference number: [id number].   7/28/2024 8:02:16 AM CDT

## 2024-07-29 RX ORDER — DULOXETIN HYDROCHLORIDE 60 MG/1
60 CAPSULE, DELAYED RELEASE ORAL
Qty: 90 CAPSULE | Refills: 1 | Status: SHIPPED | OUTPATIENT
Start: 2024-07-29

## 2024-07-29 NOTE — TELEPHONE ENCOUNTER
Refill Routing Note   Medication(s) are not appropriate for processing by [facility] for the following reason(s):        New or recently adjusted medication  No active prescription written by provider    [facility] action(s):  Defer               Appointments  past 12m or future 3m with PCP    Date Provider   Last Visit   2/20/2024 Orestes Hancock MD   Next Visit   8/20/2024 Orestes Hancock MD   ED visits in past 90 days: 0        Note composed:2:59 PM 07/29/2024

## 2024-08-01 DIAGNOSIS — R51.9 NONINTRACTABLE EPISODIC HEADACHE, UNSPECIFIED HEADACHE TYPE: ICD-10-CM

## 2024-08-01 RX ORDER — BUTALBITAL, ACETAMINOPHEN AND CAFFEINE 50; 325; 40 MG/1; MG/1; MG/1
TABLET ORAL
Qty: 30 TABLET | Refills: 0 | Status: SHIPPED | OUTPATIENT
Start: 2024-08-01

## 2024-08-01 NOTE — TELEPHONE ENCOUNTER
No care due was identified.  Health [facility] Embedded Care Due Messages. Reference number: [id number].   8/01/2024 12:42:34 PM CDT

## 2024-08-01 NOTE — TELEPHONE ENCOUNTER
----- Message from Freida Kendall Pérez sent at 8/1/2024 11:04 AM CDT -----  Contact: [phone]  Requesting an RX refill or new RX.    Is this a refill or new RX: refill     RX name and strength   butalbital-acetaminophen-caffeine -40 mg (FIORICET, ESGIC) -40 mg per tablet    Is this a 30 day or 90 day RX: 30    Pharmacy name and phone #       [facility] [address] [place] [address]  Phone: [phone] Fax: [phone]    Patient states she does not has medication and appt her appt is 08/20/2024.

## 2024-08-02 RX ORDER — GABAPENTIN 800 MG/1
800 TABLET ORAL 3 TIMES DAILY
Qty: 90 TABLET | Refills: 0 | Status: SHIPPED | OUTPATIENT
Start: 2024-08-02

## 2024-08-02 NOTE — TELEPHONE ENCOUNTER
No care due was identified.  Health [facility] Embedded Care Due Messages. Reference number: [id number].   8/02/2024 9:18:45 AM CDT

## 2024-08-13 RX ORDER — CITALOPRAM 10 MG/1
10 TABLET ORAL
Qty: 30 TABLET | Refills: 11 | OUTPATIENT
Start: 2024-08-13

## 2024-08-13 RX ORDER — MELOXICAM 7.5 MG/1
TABLET ORAL
Qty: 60 TABLET | Refills: 5 | OUTPATIENT
Start: 2024-08-13

## 2024-08-13 NOTE — TELEPHONE ENCOUNTER
Refill Decision Note   Camila Adan  is requesting a refill authorization.  Brief Assessment and Rationale for Refill:  Quick Discontinue     Medication Therapy Plan:  Mobic discontinued due to ineffective therapy.      Pharmacist review requested: Yes   Comments:     Note composed:5:52 PM 08/13/2024

## 2024-08-13 NOTE — TELEPHONE ENCOUNTER
No care due was identified.  Health [facility] Embedded Care Due Messages. Reference number: [id number].   8/13/2024 8:01:23 AM CDT

## 2024-08-13 NOTE — TELEPHONE ENCOUNTER
Refill Routing Note   Medication(s) are not appropriate for processing by [facility] for the following reason(s):        Outside of protocol: request for discontinued therapy, please consider whether QDC may be appropriate    [facility] action(s):  Route  Quick Discontinue      Medication Therapy Plan:       Pharmacist review requested: Yes     Appointments  past 12m or future 3m with PCP    Date Provider   Last Visit   2/20/2024 Orestes Hancock MD   Next Visit   8/20/2024 Orsetes Hancock MD   ED visits in past 90 days: 0        Note composed:12:33 PM 08/13/2024

## 2024-08-16 RX ORDER — DICLOFENAC SODIUM 75 MG/1
TABLET, DELAYED RELEASE ORAL
Qty: 60 TABLET | Refills: 5 | Status: SHIPPED | OUTPATIENT
Start: 2024-08-16

## 2024-08-16 NOTE — TELEPHONE ENCOUNTER
No care due was identified.  [facility] Embedded Care Due Messages. Reference number: [id number].   8/16/2024 8:04:06 AM CDT

## 2024-08-16 NOTE — TELEPHONE ENCOUNTER
Refill Routing Note   Medication(s) are not appropriate for processing by [facility] for the following reason(s):        Outside of protocol    [facility] action(s):  Route               Appointments  past 12m or future 3m with PCP    Date Provider   Last Visit   2/20/2024 Orestes Hancock MD   Next Visit   8/20/2024 Orestes Hancock MD   ED visits in past 90 days: 0        Note composed:11:05 AM 08/16/2024

## 2024-08-20 ENCOUNTER — TELEPHONE (OUTPATIENT)
Dept: OPHTHALMOLOGY | Facility: CLINIC | Age: 54
End: 2024-08-20
Payer: MEDICAID

## 2024-08-20 ENCOUNTER — OFFICE VISIT (OUTPATIENT)
Dept: PRIMARY CARE CLINIC | Facility: CLINIC | Age: 54
End: 2024-08-20
Payer: MEDICAID

## 2024-08-20 VITALS
HEART RATE: 107 BPM | OXYGEN SATURATION: 97 % | WEIGHT: 235.88 LBS | SYSTOLIC BLOOD PRESSURE: 134 MMHG | BODY MASS INDEX: 37.91 KG/M2 | RESPIRATION RATE: 18 BRPM | DIASTOLIC BLOOD PRESSURE: 84 MMHG | HEIGHT: 66 IN

## 2024-08-20 DIAGNOSIS — J98.01 BRONCHOSPASM: ICD-10-CM

## 2024-08-20 DIAGNOSIS — E66.9 OBESITY (BMI 35.0-39.9 WITHOUT COMORBIDITY): ICD-10-CM

## 2024-08-20 DIAGNOSIS — E53.8 FOLIC ACID DEFICIENCY: ICD-10-CM

## 2024-08-20 DIAGNOSIS — E55.9 VITAMIN D DEFICIENCY: ICD-10-CM

## 2024-08-20 DIAGNOSIS — N95.1 MENOPAUSAL SYNDROME: ICD-10-CM

## 2024-08-20 DIAGNOSIS — R51.9 NONINTRACTABLE EPISODIC HEADACHE, UNSPECIFIED HEADACHE TYPE: ICD-10-CM

## 2024-08-20 DIAGNOSIS — Z72.0 TOBACCO ABUSE: ICD-10-CM

## 2024-08-20 DIAGNOSIS — R23.2 HOT FLASHES: ICD-10-CM

## 2024-08-20 DIAGNOSIS — M21.371 FOOT DROP, RIGHT: ICD-10-CM

## 2024-08-20 DIAGNOSIS — G62.9 NEUROPATHY: ICD-10-CM

## 2024-08-20 DIAGNOSIS — H60.502 ACUTE OTITIS EXTERNA OF LEFT EAR, UNSPECIFIED TYPE: Primary | ICD-10-CM

## 2024-08-20 PROCEDURE — 3079F DIAST BP 80-89 MM HG: CPT | Mod: CPTII,,, | Performed by: FAMILY MEDICINE

## 2024-08-20 PROCEDURE — 3008F BODY MASS INDEX DOCD: CPT | Mod: CPTII,,, | Performed by: FAMILY MEDICINE

## 2024-08-20 PROCEDURE — 99214 OFFICE O/P EST MOD 30 MIN: CPT | Mod: S$PBB,,, | Performed by: FAMILY MEDICINE

## 2024-08-20 PROCEDURE — 99214 OFFICE O/P EST MOD 30 MIN: CPT | Mod: PBBFAC,PN | Performed by: FAMILY MEDICINE

## 2024-08-20 PROCEDURE — 3075F SYST BP GE 130 - 139MM HG: CPT | Mod: CPTII,,, | Performed by: FAMILY MEDICINE

## 2024-08-20 PROCEDURE — 4010F ACE/ARB THERAPY RXD/TAKEN: CPT | Mod: CPTII,,, | Performed by: FAMILY MEDICINE

## 2024-08-20 PROCEDURE — 3044F HG A1C LEVEL LT 7.0%: CPT | Mod: CPTII,,, | Performed by: FAMILY MEDICINE

## 2024-08-20 PROCEDURE — 1159F MED LIST DOCD IN RCRD: CPT | Mod: CPTII,,, | Performed by: FAMILY MEDICINE

## 2024-08-20 PROCEDURE — 99999 PR PBB SHADOW E&M-EST. PATIENT-LVL IV: CPT | Mod: PBBFAC,,, | Performed by: FAMILY MEDICINE

## 2024-08-20 RX ORDER — DULOXETIN HYDROCHLORIDE 60 MG/1
CAPSULE, DELAYED RELEASE ORAL
Qty: 90 CAPSULE | Refills: 3 | Status: SHIPPED | OUTPATIENT
Start: 2024-08-20

## 2024-08-20 RX ORDER — BUTALBITAL, ACETAMINOPHEN AND CAFFEINE 50; 325; 40 MG/1; MG/1; MG/1
TABLET ORAL
Qty: 30 TABLET | Refills: 2 | Status: SHIPPED | OUTPATIENT
Start: 2024-08-20

## 2024-08-20 RX ORDER — CEPHALEXIN 500 MG/1
500 CAPSULE ORAL EVERY 6 HOURS
Qty: 30 CAPSULE | Refills: 0 | Status: SHIPPED | OUTPATIENT
Start: 2024-08-20

## 2024-08-20 RX ORDER — CIPROFLOXACIN AND DEXAMETHASONE 3; 1 MG/ML; MG/ML
4 SUSPENSION/ DROPS AURICULAR (OTIC) 2 TIMES DAILY
Qty: 7.5 ML | Refills: 0 | Status: SHIPPED | OUTPATIENT
Start: 2024-08-20

## 2024-08-20 NOTE — TELEPHONE ENCOUNTER
----- Message from Elizabeth Ho sent at 8/20/2024  1:18 PM CDT -----  Contact: Self/ [phone]  Reason for the appointment:  Glaucoma    Patient preference of timeframe to be scheduled:  any     Would the patient like a call back, or a response through their [facility] portal?:   call back     Comments:  pt stated that her doctor gave her a referral to see a Glaucoma surgeon then she stated that her doctor told her to see the doctor to confirm if she has Glaucoma not sure which appt needs because she kept talking over me I provided her with the fax number and also told her that her doctor has to fax over the clinic notes

## 2024-08-20 NOTE — PROGRESS NOTES
Subjective:       Patient ID: Camila Adan is a 53 y.o. female.    Chief Complaint: 6 month check up       HPI: 54 yo WF --HA getting better---Dr. Carreno gave patient Imitrex no relief---but Fioricet helps---headaches not as frequent or as long duration--3 grandchildren living with patient 8 to and 7 months --not sleeping due hectic day   Patient has stopped drinking and stopped smoking now weighs 235  Problems with left leg swelling resolved   History of hypertension blood pressure 134/84 patient on losartan  History of osteoporosis on Fosamax  History B12 deficiency on B12  History vitamin-D deficiency on vitamin-D  Patient with depression on Celexa  History neuropathy with footdrop--on gabapentin--some ford=gling finger tips wants increase gabapenti --also in Mobic in 10 as needed wants to try a different medication in increase gabapentin              Skin: no psoriasis, eczema, skin cancer-insect bites  resolved   HEENT: + headache alot has not seen neurologist ,no  ocular pain, blurred vision, diplopia, epistaxis, hoarseness change in voice, thyroid trouble  Lung: No pneumonia, asthma, Tb, wheezing, SOB, smoking stopped recently   Heart: No chest pain, +ankle edema--were swollen other day--just left ankle , no  palpitations, MI, shane murmur, +hypertension, hyperlipidemia  Abdomen: No nausea,no vomiting   no  diarrhea, constipation, ulcers, hepatitis, gallbladder disease, melena, hematochezia, hematemesis  : no UTI, renal disease, stones  GYN LMP possibly going through menopause lots of hot flashes  MS: no fractures, O/A, lupus, rheumatoid, gout--bilateral leg pain left much greater than right--the bilateral polyneuropathy--contusion right great toe--right foot drop--  Neuro: No dizziness, LOC, seizures   No diabetes, no anemia, no anxiety, + depression--gain 25 lb  Single lives with roommate--3 children--work- unemployed  lives with roommate      Objective:   Physical Exam:    General: Well  nourished, well developed, no acute distress +obesity  Skin:  Insect bites left forearm times 10-12 superficial healing well will treat with Valisone  YUSEF T: Eyes PERRLA, EOM intact, nose patent, throat non-erythematous ears  left TM slightly injected  NECK: Supple, no bruits, No JVD, no nodes  Lungs: Clear, no rales, rhonchi, wheezing  Heart: Regular rate and rhythm, no murmurs, gallops, or rubs  Abdomen: flat, bowel sounds positive, no tenderness, or organomegaly  MS: very poor gait --widen base feet--shuffles feet--very hard for pt get on exam table --despite step up--has right foot drop-- can't dorsiflex foot   Neuro: Alert, CN intact, oriented X 3  Extremities: No cyanosis, clubbing, edema left lower leg --to calf --neg abram's neg calf tenderness but is swollen        Assessment:       1. Acute otitis externa of left ear, unspecified type    2. Hot flashes    3. Neuropathy    4. Nonintractable episodic headache, unspecified headache type    5. Tobacco abuse    6. Vitamin D deficiency    7. Folic acid deficiency    8. Foot drop, right    9. Bronchospasm    10. Menopausal syndrome    11. Obesity (BMI 35.0-39.9 without comorbidity)            Plan:       Acute otitis externa of left ear, unspecified type    Hot flashes  -     DULoxetine (CYMBALTA) 60 MG capsule; TAKE ONE CAPSULE BY MOUTH ONCE DAILY  Dispense: 90 capsule; Refill: 3    Neuropathy  -     DULoxetine (CYMBALTA) 60 MG capsule; TAKE ONE CAPSULE BY MOUTH ONCE DAILY  Dispense: 90 capsule; Refill: 3    Nonintractable episodic headache, unspecified headache type  -     butalbital-acetaminophen-caffeine -40 mg (FIORICET, ESGIC) -40 mg per tablet; TAKE ONE TABLET BY MOUTH ONCE DAILY FOR HEADACHE  Dispense: 30 tablet; Refill: 2    Tobacco abuse    Vitamin D deficiency    Folic acid deficiency    Foot drop, right    Bronchospasm    Menopausal syndrome  -     Follicle Stimulating Hormone; Future; Expected date: 08/20/2024  -     Luteinizing Hormone;  Future; Expected date: 08/20/2024    Obesity (BMI 35.0-39.9 without comorbidity)  -     Ambulatory referral/consult to Nutrition Services; Future; Expected date: 08/27/2024    Other orders  -     ciprofloxacin-dexAMETHasone 0.3-0.1% (CIPRODEX) 0.3-0.1 % DrpS; Place 4 drops into both ears 2 (two) times daily.  Dispense: 7.5 mL; Refill: 0  -     cephALEXin (KEFLEX) 500 MG capsule; Take 1 capsule (500 mg total) by mouth every 6 (six) hours.  Dispense: 30 capsule; Refill: 0            Main Reason for Visit-  Drainage left ear--yellow exudate near eardrum--Ciprodex otic 4 drops b.i.d. x7 days--Keflex 500 mg 1 p.o. t.i.d. times 10 days  Weight gain--235---quit drinking and quit smoking but vaping--see dietitian for weight loss diet  Rollator walker--patient's wore later walkers been broken needs a note so they will come to her house and fix her Rollator walker  Still with problems with neuropathy in the legs--will switch meloxicam to diclofenac--Zanaflex to Flexeril--increase gabapentin 800 mg t.i.d.--have patient see neurologist for headaches and for neuropathy   Physical therapy patient has difficulty ambulating getting physical therapy and memory would like to have physical therapy and shower med order placed for physical therapy [facility] be [facility]  Depression patient upset because all she does is sit in the house all day --on Cymbalta  History of headaches on Fioricet p.r.n--needs to see neurologist possible MRI the brain--patient was placed on Imitrex with no relief doing well with Fioricet will refill Fioricet  History of menopausal syndrome wants hormone therapy patient should try healthy woman with soy or estradiol OTC medications  Hypertension patient doing well on losartan 50 mg  History elevated liver function tests sees hepatologist   Needs do lab as in computer      Numbness feet to just below knees --  Has headache times 10 days see history of present illness--tried OTC medicines no  relief--Fioricet 1 p.o. 1-2 times per day p.r.n. headache--patient needs to do headache diary  Patient had high blood pressure during physical therapy will add Cozaar 50 mg 1 p.o. q.d.  Other medical issues  Neuropathy unable walk except with cane or walker better--fantasma with foot drop--PT suggest AFO --rolling walker --pt states does much better with that kind walker PT wants pt get one   Morbid Obesity needs lose weight   Falls --right leg gives out--but both legs get weak and shake---MRI add lumbar spine was denied--x-ray shows spondylosis with narrowing of the disc spaces facet arthropathy osteophytes  +elevated rheumatoid factor   Lab CBCs CMP lipids T4 TSH folic acid level

## 2024-08-21 ENCOUNTER — TELEPHONE (OUTPATIENT)
Dept: OPHTHALMOLOGY | Facility: CLINIC | Age: 54
End: 2024-08-21
Payer: MEDICAID

## 2024-08-21 NOTE — TELEPHONE ENCOUNTER
----- Message from Yu Bolanos MA sent at 8/20/2024  3:29 PM CDT -----  Contact: Self/ [phone]    ----- Message -----  From: Elizabeth Ho  Sent: 8/20/2024   1:24 PM CDT  To: [facility] Clinical Support Staff    Reason for the appointment:  Glaucoma    Patient preference of timeframe to be scheduled:  any     Would the patient like a call back, or a response through their [facility] portal?:   call back     Comments:  pt stated that her doctor gave her a referral to see a Glaucoma surgeon then she stated that her doctor told her to see the doctor to confirm if she has Glaucoma not sure which appt needs because she kept talking over me I provided her with the fax number and also told her that her doctor has to fax over the clinic notes

## 2024-09-10 ENCOUNTER — ANESTHESIA (OUTPATIENT)
Dept: ENDOSCOPY | Facility: OTHER | Age: 54
End: 2024-09-10
Payer: MEDICAID

## 2024-09-10 ENCOUNTER — HOSPITAL ENCOUNTER (OUTPATIENT)
Facility: OTHER | Age: 54
LOS: 1 days | Discharge: HOME | End: 2024-09-12
Attending: HOSPITALIST | Admitting: HOSPITALIST
Payer: MEDICAID

## 2024-09-10 ENCOUNTER — ANESTHESIA EVENT (OUTPATIENT)
Dept: ENDOSCOPY | Facility: OTHER | Age: 54
End: 2024-09-10
Payer: MEDICAID

## 2024-09-10 DIAGNOSIS — K92.2 UPPER GI BLEED: Primary | ICD-10-CM

## 2024-09-10 PROBLEM — K25.9 GASTRIC ULCER: Status: ACTIVE | Noted: 2024-09-10

## 2024-09-10 PROBLEM — F17.200 TOBACCO DEPENDENCY: Status: ACTIVE | Noted: 2024-09-10

## 2024-09-10 PROBLEM — K26.9 DUODENAL ULCER: Status: ACTIVE | Noted: 2024-09-10

## 2024-09-10 LAB
ABO + RH BLD: NORMAL
ALBUMIN SERPL BCP-MCNC: 3.2 G/DL (ref 3.5–5.2)
ALP SERPL-CCNC: 69 U/L (ref 55–135)
ALT SERPL W/O P-5'-P-CCNC: 30 U/L (ref 10–44)
ANION GAP SERPL CALC-SCNC: 9 MMOL/L (ref 8–16)
AST SERPL-CCNC: 33 U/L (ref 10–40)
BILIRUB SERPL-MCNC: 0.2 MG/DL (ref 0.1–1)
BLD GP AB SCN CELLS X3 SERPL QL: NORMAL
BUN SERPL-MCNC: 17 MG/DL (ref 6–20)
CALCIUM SERPL-MCNC: 9.1 MG/DL (ref 8.7–10.5)
CHLORIDE SERPL-SCNC: 105 MMOL/L (ref 95–110)
CO2 SERPL-SCNC: 21 MMOL/L (ref 23–29)
CREAT SERPL-MCNC: 1 MG/DL (ref 0.5–1.4)
EST. GFR  (NO RACE VARIABLE): >60 ML/MIN/1.73 M^2
FERRITIN SERPL-MCNC: 129 NG/ML (ref 20–300)
FOLATE SERPL-MCNC: 7.1 NG/ML (ref 4–24)
GLUCOSE SERPL-MCNC: 111 MG/DL (ref 70–110)
IRON SERPL-MCNC: 43 UG/DL (ref 30–160)
LACTATE SERPL-SCNC: 0.8 MMOL/L (ref 0.5–2.2)
POCT GLUCOSE: 115 MG/DL (ref 70–110)
POTASSIUM SERPL-SCNC: 3.8 MMOL/L (ref 3.5–5.1)
PROT SERPL-MCNC: 6.9 G/DL (ref 6–8.4)
SATURATED IRON: 19 % (ref 20–50)
SODIUM SERPL-SCNC: 135 MMOL/L (ref 136–145)
SPECIMEN OUTDATE: NORMAL
TOTAL IRON BINDING CAPACITY: 232 UG/DL (ref 250–450)
TRANSFERRIN SERPL-MCNC: 157 MG/DL (ref 200–375)
VIT B12 SERPL-MCNC: 740 PG/ML (ref 210–950)

## 2024-09-10 PROCEDURE — G0378 HOSPITAL OBSERVATION PER HR: HCPCS

## 2024-09-10 PROCEDURE — 88342 IMHCHEM/IMCYTCHM 1ST ANTB: CPT | Performed by: STUDENT IN AN ORGANIZED HEALTH CARE EDUCATION/TRAINING PROGRAM

## 2024-09-10 PROCEDURE — 80053 COMPREHEN METABOLIC PANEL: CPT | Performed by: NURSE PRACTITIONER

## 2024-09-10 PROCEDURE — 63600175 PHARM REV CODE 636 W HCPCS: Performed by: NURSE ANESTHETIST, CERTIFIED REGISTERED

## 2024-09-10 PROCEDURE — 27201012 HC FORCEPS, HOT/COLD, DISP: Performed by: INTERNAL MEDICINE

## 2024-09-10 PROCEDURE — 86900 BLOOD TYPING SEROLOGIC ABO: CPT | Performed by: NURSE PRACTITIONER

## 2024-09-10 PROCEDURE — 37000009 HC ANESTHESIA EA ADD 15 MINS: Performed by: INTERNAL MEDICINE

## 2024-09-10 PROCEDURE — 82607 VITAMIN B-12: CPT | Performed by: HOSPITALIST

## 2024-09-10 PROCEDURE — 83540 ASSAY OF IRON: CPT | Performed by: HOSPITALIST

## 2024-09-10 PROCEDURE — 94761 N-INVAS EAR/PLS OXIMETRY MLT: CPT

## 2024-09-10 PROCEDURE — 25000003 PHARM REV CODE 250: Performed by: NURSE ANESTHETIST, CERTIFIED REGISTERED

## 2024-09-10 PROCEDURE — 84466 ASSAY OF TRANSFERRIN: CPT | Performed by: HOSPITALIST

## 2024-09-10 PROCEDURE — 83605 ASSAY OF LACTIC ACID: CPT | Performed by: HOSPITALIST

## 2024-09-10 PROCEDURE — 82728 ASSAY OF FERRITIN: CPT | Performed by: HOSPITALIST

## 2024-09-10 PROCEDURE — 25000003 PHARM REV CODE 250: Performed by: NURSE PRACTITIONER

## 2024-09-10 PROCEDURE — 88342 IMHCHEM/IMCYTCHM 1ST ANTB: CPT | Mod: 26,,, | Performed by: STUDENT IN AN ORGANIZED HEALTH CARE EDUCATION/TRAINING PROGRAM

## 2024-09-10 PROCEDURE — 43239 EGD BIOPSY SINGLE/MULTIPLE: CPT | Performed by: INTERNAL MEDICINE

## 2024-09-10 PROCEDURE — 37000008 HC ANESTHESIA 1ST 15 MINUTES: Performed by: INTERNAL MEDICINE

## 2024-09-10 PROCEDURE — 63600175 PHARM REV CODE 636 W HCPCS: Performed by: NURSE PRACTITIONER

## 2024-09-10 PROCEDURE — 36415 COLL VENOUS BLD VENIPUNCTURE: CPT | Performed by: NURSE PRACTITIONER

## 2024-09-10 PROCEDURE — 25000003 PHARM REV CODE 250: Performed by: HOSPITALIST

## 2024-09-10 PROCEDURE — 88305 TISSUE EXAM BY PATHOLOGIST: CPT | Mod: 26,,, | Performed by: STUDENT IN AN ORGANIZED HEALTH CARE EDUCATION/TRAINING PROGRAM

## 2024-09-10 PROCEDURE — 25000003 PHARM REV CODE 250: Performed by: ANESTHESIOLOGY

## 2024-09-10 PROCEDURE — 88305 TISSUE EXAM BY PATHOLOGIST: CPT | Performed by: STUDENT IN AN ORGANIZED HEALTH CARE EDUCATION/TRAINING PROGRAM

## 2024-09-10 PROCEDURE — S4991 NICOTINE PATCH NONLEGEND: HCPCS | Performed by: HOSPITALIST

## 2024-09-10 PROCEDURE — 82746 ASSAY OF FOLIC ACID SERUM: CPT | Performed by: HOSPITALIST

## 2024-09-10 PROCEDURE — 86901 BLOOD TYPING SEROLOGIC RH(D): CPT | Performed by: NURSE PRACTITIONER

## 2024-09-10 RX ORDER — PANTOPRAZOLE SODIUM 40 MG/10ML
40 INJECTION, POWDER, LYOPHILIZED, FOR SOLUTION INTRAVENOUS 2 TIMES DAILY
Status: DISCONTINUED | OUTPATIENT
Start: 2024-09-10 | End: 2024-09-10

## 2024-09-10 RX ORDER — IBUPROFEN 200 MG
1 TABLET ORAL DAILY
Status: DISCONTINUED | OUTPATIENT
Start: 2024-09-10 | End: 2024-09-12 | Stop reason: HOSPADM

## 2024-09-10 RX ORDER — PROPOFOL 10 MG/ML
VIAL (ML) INTRAVENOUS
Status: DISCONTINUED | OUTPATIENT
Start: 2024-09-10 | End: 2024-09-10

## 2024-09-10 RX ORDER — PANTOPRAZOLE SODIUM 40 MG/1
40 TABLET, DELAYED RELEASE ORAL 2 TIMES DAILY
Status: DISCONTINUED | OUTPATIENT
Start: 2024-09-10 | End: 2024-09-12 | Stop reason: HOSPADM

## 2024-09-10 RX ORDER — SODIUM CHLORIDE 9 MG/ML
INJECTION, SOLUTION INTRAVENOUS CONTINUOUS
Status: DISCONTINUED | OUTPATIENT
Start: 2024-09-10 | End: 2024-09-10

## 2024-09-10 RX ORDER — ONDANSETRON HYDROCHLORIDE 2 MG/ML
4 INJECTION, SOLUTION INTRAVENOUS EVERY 8 HOURS PRN
Status: DISCONTINUED | OUTPATIENT
Start: 2024-09-10 | End: 2024-09-12 | Stop reason: HOSPADM

## 2024-09-10 RX ORDER — LIDOCAINE HYDROCHLORIDE 20 MG/ML
INJECTION INTRAVENOUS
Status: DISCONTINUED | OUTPATIENT
Start: 2024-09-10 | End: 2024-09-10

## 2024-09-10 RX ORDER — ACETAMINOPHEN 325 MG/1
650 TABLET ORAL EVERY 6 HOURS PRN
Status: DISCONTINUED | OUTPATIENT
Start: 2024-09-10 | End: 2024-09-12 | Stop reason: HOSPADM

## 2024-09-10 RX ORDER — PROCHLORPERAZINE EDISYLATE 5 MG/ML
5 INJECTION INTRAMUSCULAR; INTRAVENOUS EVERY 30 MIN PRN
Status: DISCONTINUED | OUTPATIENT
Start: 2024-09-10 | End: 2024-09-10

## 2024-09-10 RX ORDER — OXYCODONE HYDROCHLORIDE 5 MG/1
5 TABLET ORAL EVERY 6 HOURS PRN
Status: DISCONTINUED | OUTPATIENT
Start: 2024-09-10 | End: 2024-09-11

## 2024-09-10 RX ORDER — SODIUM CHLORIDE 0.9 % (FLUSH) 0.9 %
3 SYRINGE (ML) INJECTION
Status: DISCONTINUED | OUTPATIENT
Start: 2024-09-10 | End: 2024-09-10

## 2024-09-10 RX ORDER — OXYCODONE HYDROCHLORIDE 5 MG/1
5 TABLET ORAL
Status: DISCONTINUED | OUTPATIENT
Start: 2024-09-10 | End: 2024-09-10

## 2024-09-10 RX ORDER — TALC
6 POWDER (GRAM) TOPICAL NIGHTLY PRN
Status: DISCONTINUED | OUTPATIENT
Start: 2024-09-10 | End: 2024-09-12 | Stop reason: HOSPADM

## 2024-09-10 RX ORDER — MEPERIDINE HYDROCHLORIDE 25 MG/ML
12.5 INJECTION INTRAMUSCULAR; INTRAVENOUS; SUBCUTANEOUS ONCE AS NEEDED
Status: DISCONTINUED | OUTPATIENT
Start: 2024-09-10 | End: 2024-09-10

## 2024-09-10 RX ORDER — LOPERAMIDE HYDROCHLORIDE 2 MG/1
2 CAPSULE ORAL 4 TIMES DAILY PRN
Status: DISCONTINUED | OUTPATIENT
Start: 2024-09-10 | End: 2024-09-12 | Stop reason: HOSPADM

## 2024-09-10 RX ORDER — HYDROMORPHONE HYDROCHLORIDE 1 MG/ML
0.5 INJECTION, SOLUTION INTRAMUSCULAR; INTRAVENOUS; SUBCUTANEOUS EVERY 6 HOURS PRN
Status: DISCONTINUED | OUTPATIENT
Start: 2024-09-10 | End: 2024-09-10

## 2024-09-10 RX ORDER — HYDROMORPHONE HYDROCHLORIDE 2 MG/ML
0.4 INJECTION, SOLUTION INTRAMUSCULAR; INTRAVENOUS; SUBCUTANEOUS EVERY 5 MIN PRN
Status: DISCONTINUED | OUTPATIENT
Start: 2024-09-10 | End: 2024-09-10

## 2024-09-10 RX ORDER — GLUCAGON 1 MG
1 KIT INJECTION
Status: DISCONTINUED | OUTPATIENT
Start: 2024-09-10 | End: 2024-09-12 | Stop reason: HOSPADM

## 2024-09-10 RX ADMIN — LIDOCAINE HYDROCHLORIDE 100 MG: 20 INJECTION, SOLUTION INTRAVENOUS at 08:09

## 2024-09-10 RX ADMIN — NICOTINE 1 PATCH: 14 PATCH TRANSDERMAL at 09:09

## 2024-09-10 RX ADMIN — Medication 20 MG: at 08:09

## 2024-09-10 RX ADMIN — MELATONIN TAB 3 MG 6 MG: 3 TAB at 09:09

## 2024-09-10 RX ADMIN — SODIUM CHLORIDE: 9 INJECTION, SOLUTION INTRAVENOUS at 03:09

## 2024-09-10 RX ADMIN — Medication 40 MG: at 08:09

## 2024-09-10 RX ADMIN — PANTOPRAZOLE SODIUM 40 MG: 40 INJECTION, POWDER, LYOPHILIZED, FOR SOLUTION INTRAVENOUS at 03:09

## 2024-09-10 RX ADMIN — HYDROMORPHONE HYDROCHLORIDE 0.5 MG: 1 INJECTION, SOLUTION INTRAMUSCULAR; INTRAVENOUS; SUBCUTANEOUS at 05:09

## 2024-09-10 RX ADMIN — PANTOPRAZOLE SODIUM 40 MG: 40 TABLET, DELAYED RELEASE ORAL at 09:09

## 2024-09-10 RX ADMIN — ACETAMINOPHEN 650 MG: 325 TABLET, FILM COATED ORAL at 09:09

## 2024-09-10 RX ADMIN — Medication 140 MG: at 08:09

## 2024-09-10 RX ADMIN — SODIUM CHLORIDE, SODIUM LACTATE, POTASSIUM CHLORIDE, AND CALCIUM CHLORIDE: .6; .31; .03; .02 INJECTION, SOLUTION INTRAVENOUS at 07:09

## 2024-09-10 RX ADMIN — CEFTRIAXONE SODIUM 1 G: 1 INJECTION, POWDER, FOR SOLUTION INTRAMUSCULAR; INTRAVENOUS at 03:09

## 2024-09-10 RX ADMIN — OXYCODONE HYDROCHLORIDE 5 MG: 5 TABLET ORAL at 12:09

## 2024-09-10 NOTE — CONSULTS
Gastroenterology Consult    9/10/2024  7:44 AM    Consulting Physician:  Quan Vincent MD    Primary Care Provider: Orestes Hancock MD    Reason for consultation: GI bleed    HPI:  Camila Adan is a 53 y.o. female with a PMHx of fatty liver who presented to the ED at [facility] with abdominal pain, dark emesis and melenotic stools.  CTA A/P with RUQ mild non specific stranding with no intraluminal contrast extravasation.  Transferred to [facility] for GI consultation.      Admit Hb in the ED 11 with drop overnight to 9.5.  BUN normal.  Stool for occult blood positive.     Uses Meloxicam for neuropathic pain.  Denies other NSAIDs     Past Medical History:  Past Medical History:   Diagnosis Date    Allergy     Bronchitis     Fatty liver 8/16/2022       Allergies:   Review of patient's allergies indicates:  No Known Allergies    Current Medications:  Medications Prior to Admission   Medication Sig Dispense Refill Last Dose    (Magic mouthwash) 1:1:1 diphenhydrAMINE(Benadryl) 12.5mg/5ml liq, aluminum & magnesium hydroxide-simethicone (Maalox), LIDOcaine viscous 2% Swish and spit every 4-6 hours as needed for mouth sores 240 mL 0     albuterol (PROVENTIL/VENTOLIN HFA) 90 mcg/actuation inhaler Inhale 2 puffs into the lungs every 6 (six) hours as needed for Wheezing. Rescue 54 g 3     alendronate (FOSAMAX) 70 MG tablet TAKE ONE TABLET BY MOUTH EVERY 7 DAYS 12 tablet 3     betamethasone valerate 0.1% (VALISONE) 0.1 % Crea APPLY TOPICALLY TWICE DAILY 60 g 2     butalbital-acetaminophen-caffeine -40 mg (FIORICET, ESGIC) -40 mg per tablet TAKE ONE TABLET BY MOUTH ONCE DAILY FOR HEADACHE 30 tablet 2     cephALEXin (KEFLEX) 500 MG capsule Take 1 capsule (500 mg total) by mouth every 6 (six) hours. 30 capsule 0     ciprofloxacin-dexAMETHasone 0.3-0.1% (CIPRODEX) 0.3-0.1 % DrpS Place 4 drops into both ears 2 (two) times daily. 7.5 mL 0     cyanocobalamin 1,000 mcg/mL injection inject ONE ML under the skin  every WEEK FOR 10 WEEKS THEN ONE ML monthly 10 mL 5     cyclobenzaprine (FLEXERIL) 10 MG tablet 1 p.o. q.8 hours p.r.n. muscle spasm 30 tablet 5     diclofenac (VOLTAREN) 75 MG EC tablet TAKE ONE TABLET BY MOUTH TWICE DAILY FOR LEG PAIN. DO NOT TAKE WITH ANY OTHER NSAIDS. CAN TAKE WITH TYLENOL. 60 tablet 5     DULoxetine (CYMBALTA) 60 MG capsule TAKE ONE CAPSULE BY MOUTH ONCE DAILY 90 capsule 3     ergocalciferol (ERGOCALCIFEROL) 50,000 unit Cap Take 1 capsule (50,000 Units total) by mouth every 30 days. TAKE ONE CAPSULE BY MOUTH every SEVEN DAYS 3 capsule 3     gabapentin (NEURONTIN) 800 MG tablet TAKE ONE TABLET BY MOUTH THREE TIMES DAILY 90 tablet 0     IRON-C-FOLATE 9-D20-LB-STOMACH ORAL Take by mouth.       losartan (COZAAR) 50 MG tablet TAKE ONE TABLET BY MOUTH ONCE DAILY (Patient not taking: Reported on 6/10/2024) 90 tablet 3     potassium chloride (MICRO-K) 10 MEQ CpSR TAKE ONE CAPSULE BY MOUTH DAILY 90 capsule 3     sumatriptan (IMITREX) 50 MG tablet Take 1 tablet (50 mg total) by mouth every 2 (two) hours as needed for Migraine. Do not exceed 4 tablets (200 mg) within a 24 hour period. 16 tablet 0     tiZANidine (ZANAFLEX) 4 MG tablet TAKE ONE TABLET BY MOUTH EVERY 8 HOURS AS NEEDED FOR MUSCLE SPASMS 90 tablet 3          Social History:  Social History     Socioeconomic History    Marital status: Single   Tobacco Use    Smoking status: Every Day     Current packs/day: 0.50     Average packs/day: 0.5 packs/day for 12.0 years (6.0 ttl pk-yrs)     Types: Cigarettes    Smokeless tobacco: Never   Substance and Sexual Activity    Alcohol use: Yes     Alcohol/week: 4.0 standard drinks of alcohol     Types: 4 Glasses of wine per week     Comment: Socially    Drug use: No    Sexual activity: Not Currently     Partners: Male     Birth control/protection: Post-menopausal       Surgical History:  Past Surgical History:   Procedure Laterality Date    COLONOSCOPY N/A 3/7/2024    Procedure: COLONOSCOPY;  Surgeon:  Delbert Luis MD;  Location: [facility];  Service: Endoscopy;  Laterality: N/A;    TUBAL LIGATION           Family History:  Family History   Problem Relation Name Age of Onset    Breast cancer Maternal Aunt      Asthma Brother Reagan keller        Review of systems:     CONSTITUTIONAL: Negative for fever, chills, weakness, weight loss, weight gain.  HEENT: Negative for blurred vision, hearing loss, nasal congestion, dry mouth, sore throat.  CARDIOVASCULAR: Negative for chest pain or palpitations.  RESPIRATORY: Negative for SOB or cough.  GASTROINTESTINAL: See HPI  GENITOURINARY: Negative for dysuria or hematuria.  MUSCULOSKELETAL: Negative for osteoarthritis or muscle pain.  SKIN: Negative for rashes/lesions.  NEUROLOGIC: Negative for headaches, numbness/tingling.  ENDOCRINE: Negative for diabetes or thyroid abnormalities.  HEMATOLOGIC: Negative for anemia or blood dyscrasias.  Aside from above positives, complete 10 point review of systems negative.    Physical Exam:  Vital Signs (Most Recent):  Temp: 97.7 °F (36.5 °C) (09/10/24 0320)  Pulse: 88 (09/10/24 0704)  Resp: 18 (09/10/24 0520)  BP: 136/67 (09/10/24 0320)  SpO2: 99 % (09/10/24 0320) Vital Signs (24h Range):  Temp:  [97.1 °F (36.2 °C)-98.7 °F (37.1 °C)] 97.7 °F (36.5 °C)  Pulse:  [] 88  Resp:  [14-25] 18  SpO2:  [94 %-100 %] 99 %  BP: (127-179)/(65-87) 136/67       General: Well developed, well nourished, female in no acute distress.    Eyes:  Anicteric sclera, PERRLA  ENT:  Moist mucous membranes, no drainage from ears or nose, hearing grossly intact  Lymph:  No cervical, supraclavicular or axillary lymphadenopathy  Neck:  Supple, no nodes or masses felt, no thyromegaly  Cardiovascular:  Regular rate and rhythm without murmur  Lungs:  Clear to auscultation with normal effort; no wheezes or rales noted  GI:  soft, NTND, no masses  Musculoskeletal:  5/5 strength bilaterally  Extremities: No clubbing, cyanosis, or edema, 2+ dorsalis pedis  bilaterally  Neurologic:  No focal deficits, alert and oriented x 3  Psych:  Appropriate mood and affect  Skin:  No rash, no pallor, no lesions       Labs:     Latest Reference Range & Units 09/09/24 14:32 09/10/24 01:21   WBC 3.90 - 12.70 K/uL 19.15 (H)    RBC 4.00 - 5.40 M/uL 3.37 (L)    Hemoglobin 12.0 - 16.0 g/dL 11.0 (L) 9.5 (L)   Hematocrit 37.0 - 48.5 % 33.2 (L) 28.7 (L)   MCV 82 - 98 fL 99 (H)    MCH 27.0 - 31.0 pg 32.6 (H)    MCHC 32.0 - 36.0 g/dL 33.1    RDW 11.5 - 14.5 % 13.1    Platelet Count 150 - 450 K/uL 417    (H): Data is abnormally high  (L): Data is abnormally low   Latest Reference Range & Units 09/09/24 14:32 09/10/24 04:20   Sodium 136 - 145 mmol/L 136 135 (L)   Potassium 3.5 - 5.1 mmol/L 4.5 3.8   Chloride 95 - 110 mmol/L 101 105   CO2 23 - 29 mmol/L 21 (L) 21 (L)   Anion Gap 8 - 16 mmol/L 14 9   BUN 6 - 20 mg/dL 18 17   Creatinine 0.5 - 1.4 mg/dL 1.0 1.0   eGFR >60 mL/min/1.73 m^2 >60.0 >60   Glucose 70 - 110 mg/dL 114 (H) 111 (H)   Calcium 8.7 - 10.5 mg/dL 9.9 9.1   ALP 55 - 135 U/L 88 69   PROTEIN TOTAL 6.0 - 8.4 g/dL 8.2 6.9   Albumin 3.5 - 5.2 g/dL 3.7 3.2 (L)   BILIRUBIN TOTAL 0.1 - 1.0 mg/dL 0.3 0.2   AST 10 - 40 U/L 28 33   ALT 10 - 44 U/L 24 30   Lipase 4 - 60 U/L 27    (L): Data is abnormally low  (H): Data is abnormally high    Imaging and Other Studies: Personally reviewed    CTA CHEST NON CORONARY (PE STUDIES)    Impression:     1. No evidence of pulmonary thromboembolism or pulmonary infarction.  Upper limits of normal pulmonary trunk, nonspecific but can be seen with underlying pulmonary arterial hypertension.  2. Basilar predominant nonspecific pulmonary mosaic attenuation which can be seen with small vessels disease including pulmonary edema or small airways process.  No consolidation.  3. Bilateral lower lobe multiple scattered solid pulmonary nodules ranging 3-5 mm.  For multiple solid nodules all <6 mm, Fleischner Society 2017 guidelines recommend no routine follow up for a  low risk patient, or follow up with non-contrast chest CT at 12 months after discovery in a high risk patient.  4. Celiac trunk J-shaped configuration with up to 50% stenosis which can be seen with median arcuate ligament compression syndrome or asymptomatic.  5. Enlarged gamaliel hepatis and gastrohepatic lymph nodes, nonspecific.  6. Other incidental/nonemergent findings in the body of the report.  This report was flagged in Epic as abnormal.  This report was flagged in Epic as containing an incidental finding.        Electronically signed by: Sidney Camp MD  Date:                                            09/09/2024  Time:                                           16:5    CTA ABDOMEN AND PELVIS    Impression:     1. Mild nonspecific fat stranding in the right upper quadrant, adjacent to the duodenum and pancreatic head.  Findings favored to be related to low-grade duodenitis versus duodenal ulcer.  Correlate with lipase to exclude pancreatitis.  2. No evidence of intraluminal contrast extravasation.        Electronically signed by: Roger Dalton  Date:                                            09/09/2024  Time:                                           17:07    Assessment:  Patient is a 54 yo admitted with presumed UGI bleed.  Hb stable.      Plan:   Pantoprazole 40 mg IV BID   NPO   EGD this AM.     Quan Vincent

## 2024-09-10 NOTE — CONSULTS
[facility] - Med Surg ([address])  Wound Care    Patient Name:  Camila Adan   MRN:  [id number]  Date: 9/10/2024  Diagnosis: Upper GI bleed    History:     Past Medical History:   Diagnosis Date    Allergy     Bronchitis     Fatty liver 8/16/2022       Social History     Socioeconomic History    Marital status: Single   Tobacco Use    Smoking status: Every Day     Current packs/day: 0.50     Average packs/day: 0.5 packs/day for 12.0 years (6.0 ttl pk-yrs)     Types: Cigarettes    Smokeless tobacco: Never   Substance and Sexual Activity    Alcohol use: Yes     Alcohol/week: 4.0 standard drinks of alcohol     Types: 4 Glasses of wine per week     Comment: Socially    Drug use: No    Sexual activity: Not Currently     Partners: Male     Birth control/protection: Post-menopausal     Social Determinants of Health     Financial Resource Strain: Low Risk  (9/10/2024)    Overall Financial Resource Strain (CARDIA)     Difficulty of Paying Living Expenses: Not hard at all   Food Insecurity: No Food Insecurity (9/10/2024)    Hunger Vital Sign     Worried About Running Out of Food in the Last Year: Never true     Ran Out of Food in the Last Year: Never true   Transportation Needs: No Transportation Needs (9/10/2024)    TRANSPORTATION NEEDS     Transportation : No   Physical Activity: Inactive (9/10/2024)    Exercise Vital Sign     Days of Exercise per Week: 0 days     Minutes of Exercise per Session: 0 min   Stress: No Stress Concern Present (9/10/2024)    [employer] - Occupational Stress Questionnaire     Feeling of Stress : Only a little   Housing Stability: Low Risk  (9/10/2024)    Housing Stability Vital Sign     Unable to Pay for Housing in the Last Year: No     Homeless in the Last Year: No       Precautions:     Allergies as of 09/10/2024    (No Known Allergies)       WOC Assessment Details/Treatment     Wound care consult received for assessment of left lateral thigh. Patient is a 53 year  old female with a past medical history of steatotic liver disease who presented to [facility] with abd pain, dark black emesis, and melena. CTA abdomen and pelvis showed mild nonspecific stranding in the RUQ adjacent to the duodenal and pancreatic head. Findings favored to be related to low-grade duodenitis vs duodenal ulcer. No evidence of intraluminal contrast extravasation. CTA chest neg for PE. 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 The patient was accepted in consult with Gastroenterology for transfer to [facility] for further management.     Upon assessment to left lateral thigh noted full thickness wound. Patient stated that she has had this for about 2 weeks now.  Cleansed wound with Vashe and applied Aquacel Ag Hydrofiber dressing over wound bed and secured with foam dressing.     Recommend every other day dressing changes: cleanse with Vashe and apply Aquacel Ag Hydrofiber dressing to wound bed and cover with foam dressing.     Nursing and MD team notified. Orders placed. Nursing to maintain pressure injury prevention interventions. Wound care to follow.        09/10/24 0955        Wound 09/10/24 0316 Ulceration Right plantar Greater trochanter   Date First Assessed/Time First Assessed: 09/10/24 0316   Present on Original Admission: Yes  Primary Wound Type: Ulceration  Side: Right  Orientation: plantar  Location: Greater trochanter   Wound Image    Dressing Appearance Dry;Intact;Moist drainage   Drainage Amount Small   Drainage Characteristics/Odor Serosanguineous;No odor   Appearance Pink;Red;Moist   Tissue loss description Full thickness   Periwound Area Dry   Wound Edges Open   Wound Length (cm) 1.7 cm   Wound Width (cm) 3 cm   Wound Depth (cm) 0.3 cm   Wound Volume (cm^3) 1.53 cm^3   Wound Surface Area (cm^2) 5.1 cm^2   Care Cleansed with:;Antimicrobial agent   Dressing  Applied;Hydrofiber;Silicone;Foam         09/10/2024

## 2024-09-10 NOTE — HPI
The patient is a 53-y/o with a past medical history of steatotic liver disease who presented to [facility] with abd pain, dark black emesis, and melena. CTA abdomen and pelvis showed mild nonspecific stranding in the RUQ adjacent to the duodenal and pancreatic head. Findings favored to be related to low-grade duodenitis vs duodenal ulcer. No evidence of intraluminal contrast extravasation. CTA chest neg for PE. 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  The patient was accepted in consult with Gastroenterology for transfer to [facility] [place] for further management.

## 2024-09-10 NOTE — SUBJECTIVE & OBJECTIVE
Past Medical History:   Diagnosis Date    Allergy     Bronchitis     Fatty liver 8/16/2022       Past Surgical History:   Procedure Laterality Date    COLONOSCOPY N/A 3/7/2024    Procedure: COLONOSCOPY;  Surgeon: Delbert Luis MD;  Location: [facility];  Service: Endoscopy;  Laterality: N/A;    TUBAL LIGATION         Review of patient's allergies indicates:  No Known Allergies    Current Facility-Administered Medications on File Prior to Encounter   Medication    0.9%  NaCl infusion    [COMPLETED] 0.9%  NaCl infusion    [COMPLETED] HYDROmorphone injection 0.5 mg    [COMPLETED] HYDROmorphone injection 0.5 mg    [COMPLETED] iohexoL (OMNIPAQUE 350) injection 100 mL    [COMPLETED] iohexoL (OMNIPAQUE 350) injection 75 mL    [COMPLETED] mupirocin 2 % ointment    [COMPLETED] ondansetron injection 4 mg    [COMPLETED] pantoprazole injection 40 mg     Current Outpatient Medications on File Prior to Encounter   Medication Sig    (Magic mouthwash) 1:1:1 diphenhydrAMINE(Benadryl) 12.5mg/5ml liq, aluminum & magnesium hydroxide-simethicone (Maalox), LIDOcaine viscous 2% Swish and spit every 4-6 hours as needed for mouth sores    albuterol (PROVENTIL/VENTOLIN HFA) 90 mcg/actuation inhaler Inhale 2 puffs into the lungs every 6 (six) hours as needed for Wheezing. Rescue    alendronate (FOSAMAX) 70 MG tablet TAKE ONE TABLET BY MOUTH EVERY 7 DAYS    betamethasone valerate 0.1% (VALISONE) 0.1 % Crea APPLY TOPICALLY TWICE DAILY    butalbital-acetaminophen-caffeine -40 mg (FIORICET, ESGIC) -40 mg per tablet TAKE ONE TABLET BY MOUTH ONCE DAILY FOR HEADACHE    cephALEXin (KEFLEX) 500 MG capsule Take 1 capsule (500 mg total) by mouth every 6 (six) hours.    ciprofloxacin-dexAMETHasone 0.3-0.1% (CIPRODEX) 0.3-0.1 % DrpS Place 4 drops into both ears 2 (two) times daily.    cyanocobalamin 1,000 mcg/mL injection inject ONE ML under the skin every WEEK FOR 10 WEEKS THEN ONE ML monthly    cyclobenzaprine (FLEXERIL) 10 MG  tablet 1 p.o. q.8 hours p.r.n. muscle spasm    diclofenac (VOLTAREN) 75 MG EC tablet TAKE ONE TABLET BY MOUTH TWICE DAILY FOR LEG PAIN. DO NOT TAKE WITH ANY OTHER NSAIDS. CAN TAKE WITH TYLENOL.    DULoxetine (CYMBALTA) 60 MG capsule TAKE ONE CAPSULE BY MOUTH ONCE DAILY    ergocalciferol (ERGOCALCIFEROL) 50,000 unit Cap Take 1 capsule (50,000 Units total) by mouth every 30 days. TAKE ONE CAPSULE BY MOUTH every SEVEN DAYS    gabapentin (NEURONTIN) 800 MG tablet TAKE ONE TABLET BY MOUTH THREE TIMES DAILY    IRON-C-FOLATE 4-E37-VU-STOMACH ORAL Take by mouth.    losartan (COZAAR) 50 MG tablet TAKE ONE TABLET BY MOUTH ONCE DAILY (Patient not taking: Reported on 6/10/2024)    potassium chloride (MICRO-K) 10 MEQ CpSR TAKE ONE CAPSULE BY MOUTH DAILY    sumatriptan (IMITREX) 50 MG tablet Take 1 tablet (50 mg total) by mouth every 2 (two) hours as needed for Migraine. Do not exceed 4 tablets (200 mg) within a 24 hour period.    tiZANidine (ZANAFLEX) 4 MG tablet TAKE ONE TABLET BY MOUTH EVERY 8 HOURS AS NEEDED FOR MUSCLE SPASMS     Family History       Problem Relation (Age of Onset)    Asthma Brother    Breast cancer Maternal Aunt          Tobacco Use    Smoking status: Every Day     Current packs/day: 0.50     Average packs/day: 0.5 packs/day for 12.0 years (6.0 ttl pk-yrs)     Types: Cigarettes    Smokeless tobacco: Never   Substance and Sexual Activity    Alcohol use: Yes     Alcohol/week: 4.0 standard drinks of alcohol     Types: 4 Glasses of wine per week     Comment: Socially    Drug use: No    Sexual activity: Not Currently     Partners: Male     Birth control/protection: Post-menopausal     Review of Systems   Constitutional:  Negative for activity change, appetite change and fever.   HENT:  Negative for congestion, ear pain, rhinorrhea and sinus pressure.    Eyes:  Negative for pain and discharge.   Respiratory:  Negative for cough, chest tightness, shortness of breath and wheezing.    Cardiovascular:  Negative for  chest pain and leg swelling.   Gastrointestinal:  Positive for blood in stool and vomiting (dark colored). Negative for abdominal distention, abdominal pain, diarrhea and nausea.   Endocrine: Negative for cold intolerance and heat intolerance.   Genitourinary:  Negative for difficulty urinating, flank pain, frequency, hematuria and urgency.   Musculoskeletal:  Negative for arthralgias, joint swelling and myalgias.   Allergic/Immunologic: Negative for environmental allergies and food allergies.   Neurological:  Negative for dizziness, weakness, light-headedness and headaches.   Hematological:  Does not bruise/bleed easily.   Psychiatric/Behavioral:  Negative for agitation, behavioral problems and decreased concentration.      Objective:     Vital Signs (Most Recent):  Temp: 97.7 °F (36.5 °C) (09/10/24 0320)  Pulse: 93 (09/10/24 0351)  Resp: 17 (09/10/24 0320)  BP: 136/67 (09/10/24 0320)  SpO2: 99 % (09/10/24 0320) Vital Signs (24h Range):  Temp:  [97.1 °F (36.2 °C)-98.7 °F (37.1 °C)] 97.7 °F (36.5 °C)  Pulse:  [] 93  Resp:  [14-25] 17  SpO2:  [94 %-100 %] 99 %  BP: (127-179)/(65-87) 136/67     Weight: 103 kg (227 lb 1.2 oz)  Body mass index is 36.65 kg/m².     Physical Exam  Constitutional:       Appearance: Normal appearance. She is well-developed.   HENT:      Head: Normocephalic.   Eyes:      General:         Right eye: No discharge.         Left eye: No discharge.      Conjunctiva/sclera: Conjunctivae normal.   Cardiovascular:      Rate and Rhythm: Regular rhythm. Tachycardia present.      Pulses:           Radial pulses are 2+ on the right side and 2+ on the left side.      Heart sounds: Normal heart sounds.   Pulmonary:      Effort: Pulmonary effort is normal. No respiratory distress.      Breath sounds: Normal breath sounds.   Abdominal:      General: Bowel sounds are normal. There is no distension.      Palpations: Abdomen is soft.      Tenderness: There is abdominal tenderness in the epigastric area.    Musculoskeletal:         General: Normal range of motion.      Cervical back: Normal range of motion and neck supple.   Skin:     General: Skin is warm and dry.   Neurological:      Mental Status: She is alert and oriented to person, place, and time.      GCS: GCS eye subscore is 4. GCS verbal subscore is 5. GCS motor subscore is 6.      Motor: Motor function is intact.   Psychiatric:         Mood and Affect: Mood normal.         Speech: Speech normal.         Behavior: Behavior normal.         Thought Content: Thought content normal.                Significant Labs: All pertinent labs within the past 24 hours have been reviewed.  CBC:   Recent Labs   Lab 09/09/24  1432 09/10/24  0121   WBC 19.15*  --    HGB 11.0* 9.5*   HCT 33.2* 28.7*     --      CMP:   Recent Labs   Lab 09/09/24  1432      K 4.5      CO2 21*   *   BUN 18   CREATININE 1.0   CALCIUM 9.9   PROT 8.2   ALBUMIN 3.7   BILITOT 0.3   ALKPHOS 88   AST 28   ALT 24   ANIONGAP 14       Significant Imaging: I have reviewed all pertinent imaging results/findings within the past 24 hours.

## 2024-09-10 NOTE — NURSING
Nurses Note -- 4 Eyes      9/10/2024   3:15 AM      Skin assessed during: Admit      [] No Altered Skin Integrity Present    []Prevention Measures Documented      [x] Yes- Altered Skin Integrity Present or Discovered   [] LDA Added if Not in Epic (Describe Wound)   [x] New Altered Skin Integrity was Present on Admit and Documented in LDA   [x] Wound Image Taken    Wound Care Consulted? Yes    Attending Nurse:  RISA Zaldivar    Second RN/Staff Member:  RISA Segundo

## 2024-09-10 NOTE — PROVATION PATIENT INSTRUCTIONS
Discharge Summary/Instructions after an Endoscopic Procedure  Patient Name: Camila Adan  Patient MRN: [id number]  Patient YOB: 1970  Tuesday, September 10, 2024  Quan Vincent MD  RESTRICTIONS:  During your procedure today, you received medications for sedation.  These   medications may affect your judgment, balance and coordination.  Therefore,   for 24 hours, you have the following restrictions:   - DO NOT drive a car, operate machinery, make legal/financial decisions,   sign important papers or drink alcohol.    ACTIVITY:  Today: no heavy lifting, straining or running due to procedural   sedation/anesthesia.  The following day: return to full activity including work.  DIET:  Eat and drink normally unless instructed otherwise.     TREATMENT FOR COMMON SIDE EFFECTS:  - Mild abdominal pain, nausea, belching, bloating or excessive gas:  rest,   eat lightly and use a heating pad.  - Sore Throat: treat with throat lozenges and/or gargle with warm salt   water.  - Because air was used during the procedure, expelling large amounts of air   from your rectum or belching is normal.  - If a bowel prep was taken, you may not have a bowel movement for 1-3 days.    This is normal.  SYMPTOMS TO WATCH FOR AND REPORT TO YOUR PHYSICIAN:  1. Abdominal pain or bloating, other than gas cramps.  2. Chest pain.  3. Back pain.  4. Signs of infection such as: chills or fever occurring within 24 hours   after the procedure.  5. Rectal bleeding, which would show as bright red, maroon, or black stools.   (A tablespoon of blood from the rectum is not serious, especially if   hemorrhoids are present.)  6. Vomiting.  7. Weakness or dizziness.  GO DIRECTLY TO THE NEAREST EMERGENCY ROOM IF YOU HAVE ANY OF THE FOLLOWING:      Difficulty breathing              Chills and/or fever over 101 F   Persistent vomiting and/or vomiting blood   Severe abdominal pain   Severe chest pain   Black, tarry stools   Bleeding- more than one  tablespoon   Any other symptom or condition that you feel may need urgent attention  Your doctor recommends these additional instructions:  If any biopsies were taken, your doctors clinic will contact you in 1 to 2   weeks with any results.  - Await pathology results.   - [facility] patient to [facility] for [facility] care.   - Clear liquid diet.   - Use Protonix (pantoprazole) 40 mg PO BID for 8 weeks.   - No aspirin, ibuprofen, naproxen, or other non-steroidal anti-inflammatory   drugs.   - Repeat upper endoscopy in 8 weeks to check healing.  For questions, problems or results please call your physician - Quan Vincent MD at Work:  [phone].  [facility], EMERGENCY ROOM PHONE NUMBER: [phone], [facility]   [phone].  IF A COMPLICATION OR EMERGENCY SITUATION ARISES AND YOU ARE UNABLE TO REACH   YOUR PHYSICIAN - GO DIRECTLY TO THE EMERGENCY ROOM.  MD Quan Millan MD  9/10/2024 8:17:05 AM  This report has been verified and signed electronically.  Dear patient,  As a result of recent federal legislation (The Federal Cures Act), you may   receive lab or pathology results from your procedure in your [facility]   account before your physician is able to contact you. Your physician or   their representative will relay the results to you with their   recommendations at their soonest availability.  Thank you,  [employer]OVATION

## 2024-09-10 NOTE — PLAN OF CARE
MSW met with the patient at the bedside.     Patient is alert and oriented with no communication barriers.     Patient has  in the home.     Patients PCP is correct on the face sheet. Patient choice pharmacy is bedside delivery.     Patient will need transportation home at discharge.     CM team will continue to follow.     [facility] ([address])  Initial Discharge Assessment       Primary Care Provider: Orestes Hancock MD    Admission Diagnosis: Upper GI bleed [K92.2]    Admission Date: 9/10/2024  Expected Discharge Date: 9/12/2024    Transition of Care Barriers: (P) None    Payor: MEDICAID / Plan: [facility] COMMUNITY PLAN [facility] ([place] MEDICAID) / Product Type: Managed Medicaid /     Extended Emergency Contact Information  Primary Emergency Contact: Brittany Grider  Address: [address] [place] [id number] [facility]  Home Phone: [phone]  Relation: Other  Secondary Emergency Contact: Dk Grider   [facility]  Home Phone: [phone]  Mobile Phone: [phone]  Relation: Relative    Discharge Plan A: (P) Home with family  Discharge Plan B: (P) Home Health, Home with family      [facility] - [place] [facility] - [address]  [address] [place] [address]  Phone: [phone] Fax: [phone]      Initial Assessment (most recent)       Adult Discharge Assessment - 09/10/24 0923          Discharge Assessment    Assessment Type Discharge Planning Assessment (P)      Confirmed/corrected address, phone number and insurance Yes (P)      Confirmed Demographics Correct on Facesheet (P)      Source of Information patient;health record (P)      People in Home other relative(s) (P)      Do you expect to return to your current living situation? Yes (P)      Do you have help at home or someone to help you manage your care at home? Yes (P)      Who are your caregiver(s) and their phone number(s)? Family (P)      Prior to  hospitilization cognitive status: Alert/Oriented (P)      Current cognitive status: Alert/Oriented (P)      Walking or Climbing Stairs Difficulty yes (P)      Walking or Climbing Stairs ambulation difficulty, requires equipment (P)      Dressing/Bathing Difficulty no (P)      Equipment Currently Used at Home rollator (P)      Readmission within 30 days? No (P)      Patient currently being followed by outpatient case management? No (P)      Do you currently have service(s) that help you manage your care at home? No (P)      Do you take prescription medications? Yes (P)      Do you have prescription coverage? Yes (P)      Do you have any problems affording any of your prescribed medications? No (P)      Is the patient taking medications as prescribed? no (P)      How do you get to doctors appointments? family or friend will provide (P)      Are you on dialysis? No (P)      Do you take coumadin? No (P)      Discharge Plan A Home with family (P)      Discharge Plan B Home Health;Home with family (P)      DME Needed Upon Discharge  none (P)      Discharge Plan discussed with: Patient (P)      Transition of Care Barriers None (P)         Physical Activity    On average, how many days per week do you engage in moderate to strenuous exercise (like a brisk walk)? 0 days (P)      On average, how many minutes do you engage in exercise at this level? 0 min (P)         Financial Resource Strain    How hard is it for you to pay for the very basics like food, housing, medical care, and heating? Not hard at all (P)         Housing Stability    In the last 12 months, was there a time when you were not able to pay the mortgage or rent on time? No (P)      At any time in the past 12 months, were you homeless or living in a shelter (including now)? No (P)         Transportation Needs    Has the lack of transportation kept you from medical appointments, meetings, work or from getting things needed for daily living? No (P)         Food  Insecurity    Within the past 12 months, you worried that your food would run out before you got the money to buy more. Never true (P)      Within the past 12 months, the food you bought just didn't last and you didn't have money to get more. Never true (P)         Stress    Do you feel stress - tense, restless, nervous, or anxious, or unable to sleep at night because your mind is troubled all the time - these days? Only a little (P)         Social Isolation    How often do you feel lonely or isolated from those around you?  Never (P)         Alcohol Use    Q1: How often do you have a drink containing alcohol? 2-4 times a month (P)      Q2: How many drinks containing alcohol do you have on a typical day when you are drinking? Patient does not drink (P)      Q3: How often do you have six or more drinks on one occasion? Never (P)         Utilities    In the past 12 months has the electric, gas, oil, or water company threatened to shut off services in your home? No (P)         Health Literacy    How often do you need to have someone help you when you read instructions, pamphlets, or other written material from your doctor or pharmacy? Never (P)

## 2024-09-10 NOTE — SUBJECTIVE & OBJECTIVE
Interval History: No acute events overnight.  Seen after EGD and in good spirits.  Notes some slight left sided abdominal discomfort and reports she is very hungry.  All questions answered and patient had no further complaints.    Objective:     Vital Signs (Most Recent):  Temp: 97.9 °F (36.6 °C) (09/10/24 0900)  Pulse: 93 (09/10/24 1058)  Resp: 18 (09/10/24 1231)  BP: 134/71 (09/10/24 0900)  SpO2: 99 % (09/10/24 0900) Vital Signs (24h Range):  Temp:  [97.1 °F (36.2 °C)-98.7 °F (37.1 °C)] 97.9 °F (36.6 °C)  Pulse:  [] 93  Resp:  [14-25] 18  SpO2:  [94 %-100 %] 99 %  BP: (116-179)/(65-87) 134/71     Weight: 103 kg (227 lb 1.2 oz)  Body mass index is 36.65 kg/m².    Intake/Output Summary (Last 24 hours) at 9/10/2024 1255  Last data filed at 9/10/2024 0823  Gross per 24 hour   Intake 608.57 ml   Output --   Net 608.57 ml         Physical Exam  Constitutional:       Appearance: Normal appearance. She is well-developed.   HENT:      Head: Normocephalic.   Eyes:      General:         Right eye: No discharge.         Left eye: No discharge.      Conjunctiva/sclera: Conjunctivae normal.   Cardiovascular:      Rate and Rhythm: Regular rhythm. Tachycardia present.      Heart sounds: Normal heart sounds.   Pulmonary:      Effort: Pulmonary effort is normal. No respiratory distress.      Breath sounds: Normal breath sounds.   Abdominal:      General: Bowel sounds are normal. There is no distension.      Palpations: Abdomen is soft.      Tenderness: There is abdominal tenderness in the epigastric area.   Musculoskeletal:         General: Normal range of motion.      Cervical back: Normal range of motion and neck supple.   Skin:     General: Skin is warm and dry.   Neurological:      Mental Status: She is alert and oriented to person, place, and time.      GCS: GCS eye subscore is 4. GCS verbal subscore is 5. GCS motor subscore is 6.      Motor: Motor function is intact.   Psychiatric:         Mood and Affect: Mood normal.          Behavior: Behavior normal.             Significant Labs: All pertinent labs within the past 24 hours have been reviewed.    Significant Imaging: I have reviewed all pertinent imaging results/findings within the past 24 hours.

## 2024-09-10 NOTE — H&P
[address] Medicine  History & Physical    Patient Name: Camila Adan  MRN: [id number]  Patient Class: IP- Inpatient  Admission Date: 9/10/2024  Attending Physician: Moiz Ann MD   Primary Care Provider: Orestes Hancock MD         Patient information was obtained from patient, past medical records, and ER records.     Subjective:     Principal Problem:Upper GI bleed    Chief Complaint:   Chief Complaint   Patient presents with    GI Bleeding        HPI: The patient is a 53-y/o with a past medical history of steatotic liver disease who presented to [facility] with abd pain, dark black emesis, and melena. CTA abdomen and pelvis showed mild nonspecific stranding in the RUQ adjacent to the duodenal and pancreatic head. Findings favored to be related to low-grade duodenitis vs duodenal ulcer. No evidence of intraluminal contrast extravasation. CTA chest neg for PE. 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  The patient was accepted in consult with Gastroenterology for transfer to [facility] for further management.    Past Medical History:   Diagnosis Date    Allergy     Bronchitis     Fatty liver 8/16/2022       Past Surgical History:   Procedure Laterality Date    COLONOSCOPY N/A 3/7/2024    Procedure: COLONOSCOPY;  Surgeon: Delbert Luis MD;  Location: [facility];  Service: Endoscopy;  Laterality: N/A;    TUBAL LIGATION         Review of patient's allergies indicates:  No Known Allergies    Current Facility-Administered Medications on File Prior to Encounter   Medication    0.9%  NaCl infusion    [COMPLETED] 0.9%  NaCl infusion    [COMPLETED] HYDROmorphone injection 0.5 mg    [COMPLETED] HYDROmorphone injection 0.5 mg    [COMPLETED] iohexoL (OMNIPAQUE 350) injection 100 mL    [COMPLETED] iohexoL (OMNIPAQUE 350) injection 75 mL    [COMPLETED] mupirocin 2 %  ointment    [COMPLETED] ondansetron injection 4 mg    [COMPLETED] pantoprazole injection 40 mg     Current Outpatient Medications on File Prior to Encounter   Medication Sig    (Magic mouthwash) 1:1:1 diphenhydrAMINE(Benadryl) 12.5mg/5ml liq, aluminum & magnesium hydroxide-simethicone (Maalox), LIDOcaine viscous 2% Swish and spit every 4-6 hours as needed for mouth sores    albuterol (PROVENTIL/VENTOLIN HFA) 90 mcg/actuation inhaler Inhale 2 puffs into the lungs every 6 (six) hours as needed for Wheezing. Rescue    alendronate (FOSAMAX) 70 MG tablet TAKE ONE TABLET BY MOUTH EVERY 7 DAYS    betamethasone valerate 0.1% (VALISONE) 0.1 % Crea APPLY TOPICALLY TWICE DAILY    butalbital-acetaminophen-caffeine -40 mg (FIORICET, ESGIC) -40 mg per tablet TAKE ONE TABLET BY MOUTH ONCE DAILY FOR HEADACHE    cephALEXin (KEFLEX) 500 MG capsule Take 1 capsule (500 mg total) by mouth every 6 (six) hours.    ciprofloxacin-dexAMETHasone 0.3-0.1% (CIPRODEX) 0.3-0.1 % DrpS Place 4 drops into both ears 2 (two) times daily.    cyanocobalamin 1,000 mcg/mL injection inject ONE ML under the skin every WEEK FOR 10 WEEKS THEN ONE ML monthly    cyclobenzaprine (FLEXERIL) 10 MG tablet 1 p.o. q.8 hours p.r.n. muscle spasm    diclofenac (VOLTAREN) 75 MG EC tablet TAKE ONE TABLET BY MOUTH TWICE DAILY FOR LEG PAIN. DO NOT TAKE WITH ANY OTHER NSAIDS. CAN TAKE WITH TYLENOL.    DULoxetine (CYMBALTA) 60 MG capsule TAKE ONE CAPSULE BY MOUTH ONCE DAILY    ergocalciferol (ERGOCALCIFEROL) 50,000 unit Cap Take 1 capsule (50,000 Units total) by mouth every 30 days. TAKE ONE CAPSULE BY MOUTH every SEVEN DAYS    gabapentin (NEURONTIN) 800 MG tablet TAKE ONE TABLET BY MOUTH THREE TIMES DAILY    IRON-C-FOLATE 6-V16-EG-STOMACH ORAL Take by mouth.    losartan (COZAAR) 50 MG tablet TAKE ONE TABLET BY MOUTH ONCE DAILY (Patient not taking: Reported on 6/10/2024)    potassium chloride (MICRO-K) 10 MEQ CpSR TAKE ONE CAPSULE BY MOUTH DAILY    sumatriptan  (IMITREX) 50 MG tablet Take 1 tablet (50 mg total) by mouth every 2 (two) hours as needed for Migraine. Do not exceed 4 tablets (200 mg) within a 24 hour period.    tiZANidine (ZANAFLEX) 4 MG tablet TAKE ONE TABLET BY MOUTH EVERY 8 HOURS AS NEEDED FOR MUSCLE SPASMS     Family History       Problem Relation (Age of Onset)    Asthma Brother    Breast cancer Maternal Aunt          Tobacco Use    Smoking status: Every Day     Current packs/day: 0.50     Average packs/day: 0.5 packs/day for 12.0 years (6.0 ttl pk-yrs)     Types: Cigarettes    Smokeless tobacco: Never   Substance and Sexual Activity    Alcohol use: Yes     Alcohol/week: 4.0 standard drinks of alcohol     Types: 4 Glasses of wine per week     Comment: Socially    Drug use: No    Sexual activity: Not Currently     Partners: Male     Birth control/protection: Post-menopausal     Review of Systems   Constitutional:  Negative for activity change, appetite change and fever.   HENT:  Negative for congestion, ear pain, rhinorrhea and sinus pressure.    Eyes:  Negative for pain and discharge.   Respiratory:  Negative for cough, chest tightness, shortness of breath and wheezing.    Cardiovascular:  Negative for chest pain and leg swelling.   Gastrointestinal:  Positive for blood in stool and vomiting (dark colored). Negative for abdominal distention, abdominal pain, diarrhea and nausea.   Endocrine: Negative for cold intolerance and heat intolerance.   Genitourinary:  Negative for difficulty urinating, flank pain, frequency, hematuria and urgency.   Musculoskeletal:  Negative for arthralgias, joint swelling and myalgias.   Allergic/Immunologic: Negative for environmental allergies and food allergies.   Neurological:  Negative for dizziness, weakness, light-headedness and headaches.   Hematological:  Does not bruise/bleed easily.   Psychiatric/Behavioral:  Negative for agitation, behavioral problems and decreased concentration.      Objective:     Vital Signs (Most  Recent):  Temp: 97.7 °F (36.5 °C) (09/10/24 0320)  Pulse: 93 (09/10/24 0351)  Resp: 17 (09/10/24 0320)  BP: 136/67 (09/10/24 0320)  SpO2: 99 % (09/10/24 0320) Vital Signs (24h Range):  Temp:  [97.1 °F (36.2 °C)-98.7 °F (37.1 °C)] 97.7 °F (36.5 °C)  Pulse:  [] 93  Resp:  [14-25] 17  SpO2:  [94 %-100 %] 99 %  BP: (127-179)/(65-87) 136/67     Weight: 103 kg (227 lb 1.2 oz)  Body mass index is 36.65 kg/m².     Physical Exam  Constitutional:       Appearance: Normal appearance. She is well-developed.   HENT:      Head: Normocephalic.   Eyes:      General:         Right eye: No discharge.         Left eye: No discharge.      Conjunctiva/sclera: Conjunctivae normal.   Cardiovascular:      Rate and Rhythm: Regular rhythm. Tachycardia present.      Pulses:           Radial pulses are 2+ on the right side and 2+ on the left side.      Heart sounds: Normal heart sounds.   Pulmonary:      Effort: Pulmonary effort is normal. No respiratory distress.      Breath sounds: Normal breath sounds.   Abdominal:      General: Bowel sounds are normal. There is no distension.      Palpations: Abdomen is soft.      Tenderness: There is abdominal tenderness in the epigastric area.   Musculoskeletal:         General: Normal range of motion.      Cervical back: Normal range of motion and neck supple.   Skin:     General: Skin is warm and dry.   Neurological:      Mental Status: She is alert and oriented to person, place, and time.      GCS: GCS eye subscore is 4. GCS verbal subscore is 5. GCS motor subscore is 6.      Motor: Motor function is intact.   Psychiatric:         Mood and Affect: Mood normal.         Speech: Speech normal.         Behavior: Behavior normal.         Thought Content: Thought content normal.                Significant Labs: All pertinent labs within the past 24 hours have been reviewed.  CBC:   Recent Labs   Lab 09/09/24  1432 09/10/24  0121   WBC 19.15*  --    HGB 11.0* 9.5*   HCT 33.2* 28.7*     --       CMP:   Recent Labs   Lab 09/09/24  1432      K 4.5      CO2 21*   *   BUN 18   CREATININE 1.0   CALCIUM 9.9   PROT 8.2   ALBUMIN 3.7   BILITOT 0.3   ALKPHOS 88   AST 28   ALT 24   ANIONGAP 14       Significant Imaging: I have reviewed all pertinent imaging results/findings within the past 24 hours.  Assessment/Plan:     * Upper GI bleed  Patient has acute blood loss due to hemorrhage, the hemorrhage is due to gastrointestinal bleed, patient does not have a propensity for bleeding.. Will trend hemoglobin/hematocrit Daily, as well as monitor and correct for any coagulation defects. CBC and vital signs have been reviewed and last CBC was noted-   Lab Results   Component Value Date    WBC 19.15 (H) 09/09/2024    HGB 9.5 (L) 09/10/2024    HCT 28.7 (L) 09/10/2024    MCV 99 (H) 09/09/2024     09/09/2024         Will order a type and screen and consent patient for blood transfusion. Will transfuse if Hgb is <7g/dl (<8g/dl in cases of active ACS) or if patient has rapid bleeding leading to hemodynamic instability.  Consult GI. NPO        VTE Risk Mitigation (From admission, onward)           Ordered     IP VTE HIGH RISK PATIENT  Once         09/10/24 0251     Place sequential compression device  Until discontinued         09/10/24 0251     Reason for No Pharmacological VTE Prophylaxis  Once        Question:  Reasons:  Answer:  Active Bleeding    09/10/24 0251                                    Rico Chairez NP  Department of Hospital Medicine  [facility] ([address])

## 2024-09-10 NOTE — ANESTHESIA PREPROCEDURE EVALUATION
09/10/2024  Camila Adan is a 53 y.o., female.      Pre-op Assessment    I have reviewed the Patient Summary Reports.     I have reviewed the Nursing Notes. I have reviewed the NPO Status.   I have reviewed the Medications.     Review of Systems  Anesthesia Hx:  No problems with previous Anesthesia                Social:  Non-Smoker       Hematology/Oncology:       -- Anemia:                                  EENT/Dental:  EENT/Dental Normal           Cardiovascular:  Exercise tolerance: good                                           Pulmonary:  Pulmonary Normal                       Hepatic/GI:      Liver Disease,            Neurological:    Neuromuscular Disease,  Headaches                                 Endocrine:  Endocrine Normal            Psych:  Psychiatric Normal                    Physical Exam  General: Cooperative and Alert    Airway:  Mallampati: II   Mouth Opening: Normal  TM Distance: Normal  Tongue: Normal  Neck ROM: Normal ROM    Dental:  Intact        Anesthesia Plan  Type of Anesthesia, risks & benefits discussed:    Anesthesia Type: MAC  Intra-op Monitoring Plan: Standard ASA Monitors  Post Op Pain Control Plan: multimodal analgesia  Induction:  IV  Informed Consent: Informed consent signed with the Patient and all parties understand the risks and agree with anesthesia plan.  All questions answered.   ASA Score: 3 Emergent    Ready For Surgery From Anesthesia Perspective.     .

## 2024-09-10 NOTE — PLAN OF CARE
Problem: Adult Inpatient Plan of Care  Goal: Plan of Care Review  Outcome: Not Progressing  Goal: Patient-Specific Goal (Individualized)  Outcome: Not Progressing  Goal: Absence of Hospital-Acquired Illness or Injury  Outcome: Not Progressing  Goal: Optimal Comfort and Wellbeing  Outcome: Not Progressing  Goal: Readiness for Transition of Care  Outcome: Not Progressing     Problem: Gastrointestinal Bleeding  Goal: Optimal Coping with Acute Illness  Outcome: Not Progressing  Goal: Hemostasis  Outcome: Not Progressing     Problem: Wound  Goal: Optimal Coping  Outcome: Not Progressing  Goal: Optimal Functional Ability  Outcome: Not Progressing  Goal: Absence of Infection Signs and Symptoms  Outcome: Not Progressing  Goal: Improved Oral Intake  Outcome: Not Progressing  Goal: Optimal Pain Control and Function  Outcome: Not Progressing  Goal: Skin Health and Integrity  Outcome: Not Progressing  Goal: Optimal Wound Healing  Outcome: Not Progressing

## 2024-09-10 NOTE — ASSESSMENT & PLAN NOTE
Patient has acute blood loss due to hemorrhage, the hemorrhage is due to gastrointestinal bleed, patient does not have a propensity for bleeding.. Will trend hemoglobin/hematocrit Daily, as well as monitor and correct for any coagulation defects. CBC and vital signs have been reviewed and last CBC was noted-   Lab Results   Component Value Date    WBC 19.15 (H) 09/09/2024    HGB 9.5 (L) 09/10/2024    HCT 28.7 (L) 09/10/2024    MCV 99 (H) 09/09/2024     09/09/2024         Will order a type and screen and consent patient for blood transfusion. Will transfuse if Hgb is <7g/dl (<8g/dl in cases of active ACS) or if patient has rapid bleeding leading to hemodynamic instability.  Consult GI. NPO

## 2024-09-10 NOTE — ASSESSMENT & PLAN NOTE
-Placed in observation  -Presented with abdominal pain, melena and dark black emesis  -CTA abdomen/pelvis showed mild non-specific stranding in RUQ adjacent to duodenal and pancreatic head without intraluminal contrast extravasation.  -Hb 11.0 on admit and today down to 9.5  -GI consulted and input appreciated.  -Taken for EGD today which showed normal esophagus, one non-bleeding gastric ulcer with pigmented material (Biopsied), one non-bleeding duodenal ulcer with no stigmata of bleeding  -Advance diet as tolerated today  -Trend H/H and transfuse to keep Hb > 7.  -Continue bid PPI x 8 weeks, avoid NSAIDS.  Will need repeat upper endoscopy in 8 weeks to check healing.  Follow up with GI within 2 weeks for pathology.  -If H/H stable tomorrow and tolerating diet may be able to go home.

## 2024-09-10 NOTE — PROGRESS NOTES
[facility] [address] Medicine  Progress Note    Patient Name: Camila Adan  MRN: [id number]  Patient Class: OP- Observation   Admission Date: 9/10/2024  Length of Stay: 1 days  Attending Physician: Moiz Ann MD  Primary Care Provider: Orestes Hancock MD        Subjective:     Principal Problem:Upper GI bleed        HPI:  The patient is a 53-y/o with a past medical history of steatotic liver disease who presented to [facility] with abd pain, dark black emesis, and melena. CTA abdomen and pelvis showed mild nonspecific stranding in the RUQ adjacent to the duodenal and pancreatic head. Findings favored to be related to low-grade duodenitis vs duodenal ulcer. No evidence of intraluminal contrast extravasation. CTA chest neg for PE. 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  The patient was accepted in consult with Gastroenterology for transfer to [facility] for further management.    Overview/Hospital Course:  No notes on file    Interval History: No acute events overnight.  Seen after EGD and in good spirits.  Notes some slight left sided abdominal discomfort and reports she is very hungry.  All questions answered and patient had no further complaints.    Objective:     Vital Signs (Most Recent):  Temp: 97.9 °F (36.6 °C) (09/10/24 0900)  Pulse: 93 (09/10/24 1058)  Resp: 18 (09/10/24 1231)  BP: 134/71 (09/10/24 0900)  SpO2: 99 % (09/10/24 0900) Vital Signs (24h Range):  Temp:  [97.1 °F (36.2 °C)-98.7 °F (37.1 °C)] 97.9 °F (36.6 °C)  Pulse:  [] 93  Resp:  [14-25] 18  SpO2:  [94 %-100 %] 99 %  BP: (116-179)/(65-87) 134/71     Weight: 103 kg (227 lb 1.2 oz)  Body mass index is 36.65 kg/m².    Intake/Output Summary (Last 24 hours) at 9/10/2024 1255  Last data filed at 9/10/2024 0823  Gross per 24 hour   Intake 608.57 ml   Output --   Net 608.57 ml         Physical  Exam  Constitutional:       Appearance: Normal appearance. She is well-developed.   HENT:      Head: Normocephalic.   Eyes:      General:         Right eye: No discharge.         Left eye: No discharge.      Conjunctiva/sclera: Conjunctivae normal.   Cardiovascular:      Rate and Rhythm: Regular rhythm. Tachycardia present.      Heart sounds: Normal heart sounds.   Pulmonary:      Effort: Pulmonary effort is normal. No respiratory distress.      Breath sounds: Normal breath sounds.   Abdominal:      General: Bowel sounds are normal. There is no distension.      Palpations: Abdomen is soft.      Tenderness: There is abdominal tenderness in the epigastric area.   Musculoskeletal:         General: Normal range of motion.      Cervical back: Normal range of motion and neck supple.   Skin:     General: Skin is warm and dry.   Neurological:      Mental Status: She is alert and oriented to person, place, and time.      GCS: GCS eye subscore is 4. GCS verbal subscore is 5. GCS motor subscore is 6.      Motor: Motor function is intact.   Psychiatric:         Mood and Affect: Mood normal.         Behavior: Behavior normal.             Significant Labs: All pertinent labs within the past 24 hours have been reviewed.    Significant Imaging: I have reviewed all pertinent imaging results/findings within the past 24 hours.    Assessment/Plan:      * Upper GI bleed  -Placed in observation  -Presented with abdominal pain, melena and dark black emesis  -CTA abdomen/pelvis showed mild non-specific stranding in RUQ adjacent to duodenal and pancreatic head without intraluminal contrast extravasation.  -Hb 11.0 on admit and today down to 9.5  -GI consulted and input appreciated.  -Taken for EGD today which showed normal esophagus, one non-bleeding gastric ulcer with pigmented material (Biopsied), one non-bleeding duodenal ulcer with no stigmata of bleeding  -Advance diet as tolerated today  -Trend H/H and transfuse to keep Hb >  7.  -Continue bid PPI x 8 weeks, avoid NSAIDS.  Will need repeat upper endoscopy in 8 weeks to check healing.  Follow up with GI within 2 weeks for pathology.  -If H/H stable tomorrow and tolerating diet may be able to go home.    Gastric ulcer  -Treatment as above.    Duodenal ulcer  -Treatment as above.    Tobacco dependency  -Counselled on cessation 6 minutes.  -NRT ordered.      VTE Risk Mitigation (From admission, onward)           Ordered     IP VTE HIGH RISK PATIENT  Once         09/10/24 0251     Place sequential compression device  Until discontinued         09/10/24 0251     Reason for No Pharmacological VTE Prophylaxis  Once        Question:  Reasons:  Answer:  Active Bleeding    09/10/24 0251                    Discharge Planning   LEIGHTON: 9/12/2024     Code Status: Full Code   Is the patient medically ready for discharge?:     Reason for patient still in hospital (select all that apply): Treatment  Discharge Plan A: Home with family                  Moiz Ann MD  Department of Hospital Medicine   [facility] - [facility] Surg ([address])

## 2024-09-10 NOTE — ANESTHESIA POSTPROCEDURE EVALUATION
Anesthesia Post Evaluation    Patient: Camila Adan    Procedure(s) Performed: Procedure(s) (LRB):  EGD (ESOPHAGOGASTRODUODENOSCOPY) (N/A)    Final Anesthesia Type: MAC      Patient location during evaluation: PACU  Patient participation: Yes- Able to Participate  Level of consciousness: awake and alert  Post-procedure vital signs: reviewed and stable  Pain management: adequate  Airway patency: patent    PONV status at discharge: No PONV  Anesthetic complications: no      Cardiovascular status: blood pressure returned to baseline  Respiratory status: unassisted  Hydration status: euvolemic  Follow-up not needed.              Vitals Value Taken Time   /65 09/10/24 1008   Temp 36.4 °C (97.52 °F) 09/10/24 1008   Pulse 95 09/10/24 1008   Resp 17 09/10/24 1008   SpO2 99 % 09/10/24 0923   Vitals shown include unfiled device data.      Event Time   Out of Recovery 09:02:00         Pain/Jose Score: Pain Rating Prior to Med Admin: 8 (9/10/2024  5:20 AM)  Pain Rating Post Med Admin: 4 (9/10/2024  5:50 AM)  Jose Score: 10 (9/10/2024  9:00 AM)

## 2024-09-10 NOTE — TRANSFER OF CARE
"Anesthesia Transfer of Care Note    Patient: Camila Adan    Procedure(s) Performed: Procedure(s) (LRB):  EGD (ESOPHAGOGASTRODUODENOSCOPY) (N/A)    Patient location: PACU    Anesthesia Type: MAC    Transport from OR: Transported from OR on room air with adequate spontaneous ventilation    Post pain: adequate analgesia    Post assessment: no apparent anesthetic complications and tolerated procedure well    Post vital signs: stable    Level of consciousness: awake    Nausea/Vomiting: no nausea/vomiting    Complications: none    Transfer of care protocol was followed      Last vitals: Visit Vitals  /65   Pulse 105   Temp 36.5 °C (97.7 °F) (Oral)   Resp 16   Ht 5' 6" (1.676 m)   Wt 103 kg (227 lb 1.2 oz)   LMP 05/11/2021   SpO2 97%   Breastfeeding No   BMI 36.65 kg/m²     "

## 2024-09-11 LAB
ANION GAP SERPL CALC-SCNC: 8 MMOL/L (ref 8–16)
BASOPHILS # BLD AUTO: 0.05 K/UL (ref 0–0.2)
BASOPHILS NFR BLD: 0.5 % (ref 0–1.9)
BUN SERPL-MCNC: 8 MG/DL (ref 6–20)
CALCIUM SERPL-MCNC: 9.3 MG/DL (ref 8.7–10.5)
CHLORIDE SERPL-SCNC: 109 MMOL/L (ref 95–110)
CO2 SERPL-SCNC: 23 MMOL/L (ref 23–29)
CREAT SERPL-MCNC: 0.8 MG/DL (ref 0.5–1.4)
DIFFERENTIAL METHOD BLD: ABNORMAL
EOSINOPHIL # BLD AUTO: 0.2 K/UL (ref 0–0.5)
EOSINOPHIL NFR BLD: 2.1 % (ref 0–8)
ERYTHROCYTE [DISTWIDTH] IN BLOOD BY AUTOMATED COUNT: 13.6 % (ref 11.5–14.5)
EST. GFR  (NO RACE VARIABLE): >60 ML/MIN/1.73 M^2
GLUCOSE SERPL-MCNC: 101 MG/DL (ref 70–110)
HCT VFR BLD AUTO: 28.2 % (ref 37–48.5)
HGB BLD-MCNC: 9.1 G/DL (ref 12–16)
IMM GRANULOCYTES # BLD AUTO: 0.1 K/UL (ref 0–0.04)
IMM GRANULOCYTES NFR BLD AUTO: 1 % (ref 0–0.5)
LYMPHOCYTES # BLD AUTO: 2.6 K/UL (ref 1–4.8)
LYMPHOCYTES NFR BLD: 26.2 % (ref 18–48)
MCH RBC QN AUTO: 32.5 PG (ref 27–31)
MCHC RBC AUTO-ENTMCNC: 32.3 G/DL (ref 32–36)
MCV RBC AUTO: 101 FL (ref 82–98)
MONOCYTES # BLD AUTO: 0.7 K/UL (ref 0.3–1)
MONOCYTES NFR BLD: 6.8 % (ref 4–15)
NEUTROPHILS # BLD AUTO: 6.3 K/UL (ref 1.8–7.7)
NEUTROPHILS NFR BLD: 63.4 % (ref 38–73)
NRBC BLD-RTO: 0 /100 WBC
PLATELET # BLD AUTO: 332 K/UL (ref 150–450)
PMV BLD AUTO: 9.2 FL (ref 9.2–12.9)
POTASSIUM SERPL-SCNC: 3.8 MMOL/L (ref 3.5–5.1)
RBC # BLD AUTO: 2.8 M/UL (ref 4–5.4)
SODIUM SERPL-SCNC: 140 MMOL/L (ref 136–145)
WBC # BLD AUTO: 9.87 K/UL (ref 3.9–12.7)

## 2024-09-11 PROCEDURE — 94761 N-INVAS EAR/PLS OXIMETRY MLT: CPT

## 2024-09-11 PROCEDURE — 80048 BASIC METABOLIC PNL TOTAL CA: CPT | Performed by: HOSPITALIST

## 2024-09-11 PROCEDURE — 25000003 PHARM REV CODE 250: Performed by: HOSPITALIST

## 2024-09-11 PROCEDURE — 36415 COLL VENOUS BLD VENIPUNCTURE: CPT | Performed by: HOSPITALIST

## 2024-09-11 PROCEDURE — 85025 COMPLETE CBC W/AUTO DIFF WBC: CPT | Performed by: HOSPITALIST

## 2024-09-11 PROCEDURE — S4991 NICOTINE PATCH NONLEGEND: HCPCS | Performed by: HOSPITALIST

## 2024-09-11 PROCEDURE — G0378 HOSPITAL OBSERVATION PER HR: HCPCS

## 2024-09-11 RX ADMIN — ACETAMINOPHEN 650 MG: 325 TABLET, FILM COATED ORAL at 08:09

## 2024-09-11 RX ADMIN — PANTOPRAZOLE SODIUM 40 MG: 40 TABLET, DELAYED RELEASE ORAL at 08:09

## 2024-09-11 RX ADMIN — MELATONIN TAB 3 MG 6 MG: 3 TAB at 11:09

## 2024-09-11 RX ADMIN — ACETAMINOPHEN 650 MG: 325 TABLET, FILM COATED ORAL at 11:09

## 2024-09-11 RX ADMIN — NICOTINE 1 PATCH: 14 PATCH TRANSDERMAL at 08:09

## 2024-09-11 NOTE — PLAN OF CARE
Problem: Adult Inpatient Plan of Care  Goal: Plan of Care Review  Outcome: Progressing  Goal: Patient-Specific Goal (Individualized)  Outcome: Progressing  Goal: Absence of Hospital-Acquired Illness or Injury  Outcome: Progressing  Goal: Optimal Comfort and Wellbeing  Outcome: Progressing   POC reviewed with pt. A&Ox4. Room air. No acute changes. PRN medication administered for pain x1. Tolerating diet. Safety checks performed.

## 2024-09-11 NOTE — PROGRESS NOTES
[facility] Surg [address] Medicine  Progress Note    Patient Name: Camila Adan  MRN: [id number]  Patient Class: OP- Observation   Admission Date: 9/10/2024  Length of Stay: 1 days  Attending Physician: Moiz Ann MD  Primary Care Provider: Orestes Hancock MD        Subjective:     Principal Problem:Upper GI bleed        HPI:  The patient is a 53-y/o with a past medical history of steatotic liver disease who presented to [facility] with abd pain, dark black emesis, and melena. CTA abdomen and pelvis showed mild nonspecific stranding in the RUQ adjacent to the duodenal and pancreatic head. Findings favored to be related to low-grade duodenitis vs duodenal ulcer. No evidence of intraluminal contrast extravasation. CTA chest neg for PE. 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  The patient was accepted in consult with Gastroenterology for transfer to [facility] for further management.    Overview/Hospital Course:  No notes on file    Interval History: No acute events overnight.  No further bowel movements.  Tolerating liquid diet, but still complains of LLQ abdominal discomfort.  All questions answered and patient had no further complaints.    Objective:     Vital Signs (Most Recent):  Temp: 98.4 °F (36.9 °C) (09/11/24 0828)  Pulse: 96 (09/11/24 1044)  Resp: 16 (09/11/24 0828)  BP: (!) 140/68 (09/11/24 0828)  SpO2: 98 % (09/11/24 0828) Vital Signs (24h Range):  Temp:  [97.7 °F (36.5 °C)-98.4 °F (36.9 °C)] 98.4 °F (36.9 °C)  Pulse:  [79-97] 96  Resp:  [13-18] 16  SpO2:  [97 %-100 %] 98 %  BP: (136-155)/(67-71) 140/68     Weight: 103 kg (227 lb 1.2 oz)  Body mass index is 36.65 kg/m².    Intake/Output Summary (Last 24 hours) at 9/11/2024 1122  Last data filed at 9/11/2024 0645  Gross per 24 hour   Intake 0 ml   Output 1600 ml   Net -1600 ml         Physical Exam  Constitutional:        Appearance: Normal appearance. She is well-developed.   HENT:      Head: Normocephalic.   Eyes:      General:         Right eye: No discharge.         Left eye: No discharge.      Conjunctiva/sclera: Conjunctivae normal.   Cardiovascular:      Rate and Rhythm: Regular rhythm. Tachycardia present.      Heart sounds: Normal heart sounds.   Pulmonary:      Effort: Pulmonary effort is normal. No respiratory distress.      Breath sounds: Normal breath sounds.   Abdominal:      General: Bowel sounds are normal. There is no distension.      Palpations: Abdomen is soft.      Comments: Slight LLQ TTP without rebound or guarding   Musculoskeletal:         General: Normal range of motion.      Cervical back: Normal range of motion and neck supple.   Skin:     General: Skin is warm and dry.   Neurological:      Mental Status: She is alert and oriented to person, place, and time.      GCS: GCS eye subscore is 4. GCS verbal subscore is 5. GCS motor subscore is 6.      Motor: Motor function is intact.   Psychiatric:         Mood and Affect: Mood normal.         Behavior: Behavior normal.             Significant Labs: All pertinent labs within the past 24 hours have been reviewed.    Significant Imaging: I have reviewed all pertinent imaging results/findings within the past 24 hours.      Assessment/Plan:      * Upper GI bleed  -Placed in observation  -Presented with abdominal pain, melena and dark black emesis  -CTA abdomen/pelvis showed mild non-specific stranding in RUQ adjacent to duodenal and pancreatic head without intraluminal contrast extravasation.  -Hb 11.0 on admit and today down to 9.5  -GI consulted and input appreciated.  -Taken for EGD 9/10 which showed normal esophagus, one non-bleeding gastric ulcer with pigmented material (Biopsied), one non-bleeding duodenal ulcer with no stigmata of bleeding  -No further bowel movements.  Hb down to 9.1 today.  Still with abdominal pain, but exam is reassuring.  -Advance diet  today  -Trend H/H and transfuse to keep Hb > 7.  -Continue bid PPI x 8 weeks, avoid NSAIDS.  Will need repeat upper endoscopy in 8 weeks to check healing.  Follow up with GI within 2 weeks for pathology.  -If H/H stable tomorrow and tolerating diet may be able to go home.    Gastric ulcer  -Treatment as above.    Duodenal ulcer  -Treatment as above.    Tobacco dependency  -Counselled on cessation 6 minutes.  -NRT ordered.      VTE Risk Mitigation (From admission, onward)           Ordered     IP VTE HIGH RISK PATIENT  Once         09/10/24 0251     Place sequential compression device  Until discontinued         09/10/24 0251     Reason for No Pharmacological VTE Prophylaxis  Once        Question:  Reasons:  Answer:  Active Bleeding    09/10/24 0251                    Discharge Planning   LEIGHTON: 9/12/2024     Code Status: Full Code   Is the patient medically ready for discharge?:     Reason for patient still in hospital (select all that apply): Treatment  Discharge Plan A: Home with family                  Moiz Ann MD  Department of Hospital Medicine   [facility] - Med Surg ([address])

## 2024-09-11 NOTE — SUBJECTIVE & OBJECTIVE
Interval History: No acute events overnight.  No further bowel movements.  Tolerating liquid diet, but still complains of LLQ abdominal discomfort.  All questions answered and patient had no further complaints.    Objective:     Vital Signs (Most Recent):  Temp: 98.4 °F (36.9 °C) (09/11/24 0828)  Pulse: 96 (09/11/24 1044)  Resp: 16 (09/11/24 0828)  BP: (!) 140/68 (09/11/24 0828)  SpO2: 98 % (09/11/24 0828) Vital Signs (24h Range):  Temp:  [97.7 °F (36.5 °C)-98.4 °F (36.9 °C)] 98.4 °F (36.9 °C)  Pulse:  [79-97] 96  Resp:  [13-18] 16  SpO2:  [97 %-100 %] 98 %  BP: (136-155)/(67-71) 140/68     Weight: 103 kg (227 lb 1.2 oz)  Body mass index is 36.65 kg/m².    Intake/Output Summary (Last 24 hours) at 9/11/2024 1122  Last data filed at 9/11/2024 0645  Gross per 24 hour   Intake 0 ml   Output 1600 ml   Net -1600 ml         Physical Exam  Constitutional:       Appearance: Normal appearance. She is well-developed.   HENT:      Head: Normocephalic.   Eyes:      General:         Right eye: No discharge.         Left eye: No discharge.      Conjunctiva/sclera: Conjunctivae normal.   Cardiovascular:      Rate and Rhythm: Regular rhythm. Tachycardia present.      Heart sounds: Normal heart sounds.   Pulmonary:      Effort: Pulmonary effort is normal. No respiratory distress.      Breath sounds: Normal breath sounds.   Abdominal:      General: Bowel sounds are normal. There is no distension.      Palpations: Abdomen is soft.      Comments: Slight LLQ TTP without rebound or guarding   Musculoskeletal:         General: Normal range of motion.      Cervical back: Normal range of motion and neck supple.   Skin:     General: Skin is warm and dry.   Neurological:      Mental Status: She is alert and oriented to person, place, and time.      GCS: GCS eye subscore is 4. GCS verbal subscore is 5. GCS motor subscore is 6.      Motor: Motor function is intact.   Psychiatric:         Mood and Affect: Mood normal.         Behavior: Behavior  normal.             Significant Labs: All pertinent labs within the past 24 hours have been reviewed.    Significant Imaging: I have reviewed all pertinent imaging results/findings within the past 24 hours.

## 2024-09-11 NOTE — ASSESSMENT & PLAN NOTE
-Placed in observation  -Presented with abdominal pain, melena and dark black emesis  -CTA abdomen/pelvis showed mild non-specific stranding in RUQ adjacent to duodenal and pancreatic head without intraluminal contrast extravasation.  -Hb 11.0 on admit and today down to 9.5  -GI consulted and input appreciated.  -Taken for EGD 9/10 which showed normal esophagus, one non-bleeding gastric ulcer with pigmented material (Biopsied), one non-bleeding duodenal ulcer with no stigmata of bleeding  -No further bowel movements.  Hb down to 9.1 today.  Still with abdominal pain, but exam is reassuring.  -Advance diet today  -Trend H/H and transfuse to keep Hb > 7.  -Continue bid PPI x 8 weeks, avoid NSAIDS.  Will need repeat upper endoscopy in 8 weeks to check healing.  Follow up with GI within 2 weeks for pathology.  -If H/H stable tomorrow and tolerating diet may be able to go home.

## 2024-09-12 VITALS
HEART RATE: 89 BPM | WEIGHT: 227.06 LBS | HEIGHT: 66 IN | DIASTOLIC BLOOD PRESSURE: 67 MMHG | OXYGEN SATURATION: 99 % | TEMPERATURE: 98 F | SYSTOLIC BLOOD PRESSURE: 136 MMHG | BODY MASS INDEX: 36.49 KG/M2 | RESPIRATION RATE: 18 BRPM

## 2024-09-12 LAB
ANION GAP SERPL CALC-SCNC: 9 MMOL/L (ref 8–16)
BASOPHILS # BLD AUTO: 0.03 K/UL (ref 0–0.2)
BASOPHILS # BLD AUTO: 0.04 K/UL (ref 0–0.2)
BASOPHILS NFR BLD: 0.3 % (ref 0–1.9)
BASOPHILS NFR BLD: 0.3 % (ref 0–1.9)
BUN SERPL-MCNC: 14 MG/DL (ref 6–20)
CALCIUM SERPL-MCNC: 9.2 MG/DL (ref 8.7–10.5)
CHLORIDE SERPL-SCNC: 108 MMOL/L (ref 95–110)
CO2 SERPL-SCNC: 23 MMOL/L (ref 23–29)
CREAT SERPL-MCNC: 0.9 MG/DL (ref 0.5–1.4)
DIFFERENTIAL METHOD BLD: ABNORMAL
DIFFERENTIAL METHOD BLD: ABNORMAL
EOSINOPHIL # BLD AUTO: 0.2 K/UL (ref 0–0.5)
EOSINOPHIL # BLD AUTO: 0.2 K/UL (ref 0–0.5)
EOSINOPHIL NFR BLD: 1.4 % (ref 0–8)
EOSINOPHIL NFR BLD: 1.7 % (ref 0–8)
ERYTHROCYTE [DISTWIDTH] IN BLOOD BY AUTOMATED COUNT: 13.8 % (ref 11.5–14.5)
ERYTHROCYTE [DISTWIDTH] IN BLOOD BY AUTOMATED COUNT: 13.9 % (ref 11.5–14.5)
EST. GFR  (NO RACE VARIABLE): >60 ML/MIN/1.73 M^2
GLUCOSE SERPL-MCNC: 108 MG/DL (ref 70–110)
HCT VFR BLD AUTO: 25.9 % (ref 37–48.5)
HCT VFR BLD AUTO: 27.7 % (ref 37–48.5)
HGB BLD-MCNC: 8.6 G/DL (ref 12–16)
HGB BLD-MCNC: 9.2 G/DL (ref 12–16)
IMM GRANULOCYTES # BLD AUTO: 0.14 K/UL (ref 0–0.04)
IMM GRANULOCYTES # BLD AUTO: 0.14 K/UL (ref 0–0.04)
IMM GRANULOCYTES NFR BLD AUTO: 1.1 % (ref 0–0.5)
IMM GRANULOCYTES NFR BLD AUTO: 1.2 % (ref 0–0.5)
LYMPHOCYTES # BLD AUTO: 2.7 K/UL (ref 1–4.8)
LYMPHOCYTES # BLD AUTO: 3.1 K/UL (ref 1–4.8)
LYMPHOCYTES NFR BLD: 21.7 % (ref 18–48)
LYMPHOCYTES NFR BLD: 26.1 % (ref 18–48)
MCH RBC QN AUTO: 33 PG (ref 27–31)
MCH RBC QN AUTO: 33.2 PG (ref 27–31)
MCHC RBC AUTO-ENTMCNC: 33.2 G/DL (ref 32–36)
MCHC RBC AUTO-ENTMCNC: 33.2 G/DL (ref 32–36)
MCV RBC AUTO: 100 FL (ref 82–98)
MCV RBC AUTO: 99 FL (ref 82–98)
MONOCYTES # BLD AUTO: 0.8 K/UL (ref 0.3–1)
MONOCYTES # BLD AUTO: 0.9 K/UL (ref 0.3–1)
MONOCYTES NFR BLD: 6.4 % (ref 4–15)
MONOCYTES NFR BLD: 7.1 % (ref 4–15)
NEUTROPHILS # BLD AUTO: 7.6 K/UL (ref 1.8–7.7)
NEUTROPHILS # BLD AUTO: 8.5 K/UL (ref 1.8–7.7)
NEUTROPHILS NFR BLD: 63.6 % (ref 38–73)
NEUTROPHILS NFR BLD: 69.1 % (ref 38–73)
NRBC BLD-RTO: 0 /100 WBC
NRBC BLD-RTO: 0 /100 WBC
PLATELET # BLD AUTO: 367 K/UL (ref 150–450)
PLATELET # BLD AUTO: 404 K/UL (ref 150–450)
PMV BLD AUTO: 9.2 FL (ref 9.2–12.9)
PMV BLD AUTO: 9.2 FL (ref 9.2–12.9)
POTASSIUM SERPL-SCNC: 4.1 MMOL/L (ref 3.5–5.1)
RBC # BLD AUTO: 2.61 M/UL (ref 4–5.4)
RBC # BLD AUTO: 2.77 M/UL (ref 4–5.4)
SODIUM SERPL-SCNC: 140 MMOL/L (ref 136–145)
WBC # BLD AUTO: 11.93 K/UL (ref 3.9–12.7)
WBC # BLD AUTO: 12.37 K/UL (ref 3.9–12.7)

## 2024-09-12 PROCEDURE — S4991 NICOTINE PATCH NONLEGEND: HCPCS | Performed by: HOSPITALIST

## 2024-09-12 PROCEDURE — G0378 HOSPITAL OBSERVATION PER HR: HCPCS

## 2024-09-12 PROCEDURE — 85025 COMPLETE CBC W/AUTO DIFF WBC: CPT | Mod: 91 | Performed by: HOSPITALIST

## 2024-09-12 PROCEDURE — 80048 BASIC METABOLIC PNL TOTAL CA: CPT | Performed by: HOSPITALIST

## 2024-09-12 PROCEDURE — 25000003 PHARM REV CODE 250: Performed by: HOSPITALIST

## 2024-09-12 PROCEDURE — 36415 COLL VENOUS BLD VENIPUNCTURE: CPT | Performed by: HOSPITALIST

## 2024-09-12 RX ORDER — SUCRALFATE 1 G/10ML
1 SUSPENSION ORAL
Qty: 473 ML | Refills: 1 | Status: SHIPPED | OUTPATIENT
Start: 2024-09-12

## 2024-09-12 RX ORDER — SUCRALFATE 1 G/10ML
1 SUSPENSION ORAL EVERY 6 HOURS
Status: DISCONTINUED | OUTPATIENT
Start: 2024-09-12 | End: 2024-09-12 | Stop reason: HOSPADM

## 2024-09-12 RX ORDER — PANTOPRAZOLE SODIUM 40 MG/1
TABLET, DELAYED RELEASE ORAL
Qty: 90 TABLET | Refills: 0 | Status: SHIPPED | OUTPATIENT
Start: 2024-09-12

## 2024-09-12 RX ORDER — IBUPROFEN 200 MG
1 TABLET ORAL DAILY
Qty: 28 PATCH | Refills: 1 | Status: SHIPPED | OUTPATIENT
Start: 2024-09-13

## 2024-09-12 RX ADMIN — PANTOPRAZOLE SODIUM 40 MG: 40 TABLET, DELAYED RELEASE ORAL at 08:09

## 2024-09-12 RX ADMIN — ACETAMINOPHEN 650 MG: 325 TABLET, FILM COATED ORAL at 12:09

## 2024-09-12 RX ADMIN — NICOTINE 1 PATCH: 14 PATCH TRANSDERMAL at 08:09

## 2024-09-12 NOTE — PROGRESS NOTES
"Gastroenterology Progress Note    Reason for Consult: GI bleed    Subjective:  No issues overnight.  Starting to have some pain again in the epigastric region.  Also asking for a sandwich.  No N/V.  No melena.      ROS:  Gen: no F/C  CV: no CP/palpitations  Resp: no SOB/wheezing    Physical Exam  /66   Pulse 81   Temp 97.5 °F (36.4 °C)   Resp 17   Ht 5' 6" (1.676 m)   Wt 103 kg (227 lb 1.2 oz)   LMP 05/11/2021   SpO2 99%   Breastfeeding No   BMI 36.65 kg/m²   General: Awake, alert female in no apparent distress  HEENT: NC/AT, PERRL, EOMI, MMM  CV: RRR, without murmur, gallop, or rubs  Pulm: CTAB, no wheezing, rales, or rhonchi  GI: soft, mild TTP in epigastric region, non-distended, +BS, no guarding or rebound  MSK: no edema and normal ROM  Neuro: A&Ox3, moves all extremities equally, sensation grossly intact  Skin: no rashes or lesions  Psych: normal mood and affect    Medications/Infusions:  Current Facility-Administered Medications   Medication Dose Route Frequency Provider Last Rate Last Admin    acetaminophen tablet 650 mg  650 mg Oral Q6H PRN Moiz Ann MD   650 mg at 09/11/24 2353    dextrose 10% bolus 125 mL 125 mL  12.5 g Intravenous PRN Quan Vincent MD        dextrose 10% bolus 250 mL 250 mL  25 g Intravenous PRN Quan Vincent MD        glucagon (human recombinant) injection 1 mg  1 mg Intramuscular PRN Sergio Pérez MD        loperamide capsule 2 mg  2 mg Oral QID PRN Moiz Ann MD        melatonin tablet 6 mg  6 mg Oral Nightly PRN Moiz Ann MD   6 mg at 09/11/24 2353    nicotine 14 mg/24 hr 1 patch  1 patch Transdermal Daily Moiz Ann MD   1 patch at 09/12/24 0813    ondansetron injection 4 mg  4 mg Intravenous Q8H PRN Quan Vincent MD        pantoprazole EC tablet 40 mg  40 mg Oral BID Moiz Ann MD   40 mg at 09/12/24 0813    sucralfate 100 mg/mL suspension 1 g  1 g Oral Q6H Nichol Martins MD         Facility-Administered " Medications Ordered in Other Encounters   Medication Dose Route Frequency Provider Last Rate Last Admin    0.9%  NaCl infusion   Intravenous Continuous Delbert Luis MD           Intake and Output:    Intake/Output Summary (Last 24 hours) at 9/12/2024 1235  Last data filed at 9/12/2024 0841  Gross per 24 hour   Intake 240 ml   Output 1800 ml   Net -1560 ml       Labs:  Lab Results   Component Value Date/Time    WBC 11.93 09/12/2024 03:51 AM    HGB 8.6 (L) 09/12/2024 03:51 AM    HCT 25.9 (L) 09/12/2024 03:51 AM     09/12/2024 03:51 AM    MCV 99 (H) 09/12/2024 03:51 AM     09/12/2024 03:51 AM    K 4.1 09/12/2024 03:51 AM     09/12/2024 03:51 AM    CO2 23 09/12/2024 03:51 AM    BUN 14 09/12/2024 03:51 AM    CREATININE 0.9 09/12/2024 03:51 AM     09/12/2024 03:51 AM    CALCIUM 9.2 09/12/2024 03:51 AM    MG 1.8 04/22/2021 10:06 AM    INR 0.9 08/16/2022 02:10 PM   ]  Lab Results   Component Value Date/Time    PROT 6.9 09/10/2024 04:20 AM    ALBUMIN 3.2 (L) 09/10/2024 04:20 AM    BILITOT 0.2 09/10/2024 04:20 AM    AST 33 09/10/2024 04:20 AM    ALT 30 09/10/2024 04:20 AM    ALKPHOS 69 09/10/2024 04:20 AM   ]    Imaging and Procedures:  Labs and imaging results were reviewed.  EGD 9/10/24:  - Normal esophagus.   - Non-bleeding gastric ulcer with pigmented material. Biopsied.   - Non-bleeding duodenal ulcer with no stigmata of bleeding.   - Small hiatal hernia.     Assessment:  Camila Adan is a 53 y.o. female with a history of fatty liver and bronchitis admitted with epigastric pain, coffee ground emesis and melena.  S/p EGD with peptic ulcer disease.    Plan:  - protonix 40 mg PO BID x 1 month  - follow up biopsy results - treat for H. Pylori if positive  - will add carafate 10 mL QID given ongoing pain  - diet as tolerated  - hopefully home tomorrow if H/H stabilized  - avoid NSAIDs  - EGD in 2 months to check ulcer healing    Nichol Martins MD

## 2024-09-12 NOTE — DISCHARGE INSTRUCTIONS
Take all medications as prescribed.  Eat a cardiac diet  Follow up with your physicians as scheduled - pcp within 1 week and gastroenterologist within 2 weeks for results of your biopsy.  Thank you for trusting [facility] and Dr. Ann with your care.  We are honored that you entrusted us with your healthcare needs. Your satisfaction is very important to us and we hope you have been very pleased with your experience at [facility]. After your discharge you may receive a survey asking you to rate your hospital experience. We encourage you to take the time to complete the survey as your feedback allows us to identify areas for improvement as well as recognize our staff.   We hope that you have received the very best care possible during your hospitalization at [facility], as your satisfaction is our top priority.

## 2024-09-12 NOTE — PLAN OF CARE
CM met with pt for final discharge planning assessment. Pt will discharge home today.    Meds to be delivered to bedside prior to discharge. Pt will call her uncle for transportation once meds are delivered.    Pt is ready and anxious for discharge.    CM explained that [facility] would call her to schedule her follow-up apt with GI. Pt verbalized understanding.    Pt's PCP, Orestes Hancock MD, will call her to schedule a hospital follow-up visit. She has an apt scheduled for Feburary, so will go sooner.    Pt is ready for discharge from CM perspective.   09/12/24 1506   Final Note   Assessment Type Final Discharge Note   Anticipated Discharge Disposition Home   What phone number can be called within the next 1-3 days to see how you are doing after discharge? [phone]   Hospital Resources/Appts/Education Provided Provided patient/caregiver with written discharge plan information;Provided education on problems/symptoms using teachback;Appointments scheduled and added to AVS   Post-Acute Status   Discharge Delays None known at this time     [facility] - Med Surg ([address])  Discharge Final Note    Primary Care Provider: Orestes Hancock MD    Expected Discharge Date: 9/12/2024    Final Discharge Note (most recent)       Final Note - 09/12/24 1506          Final Note    Assessment Type Final Discharge Note (P)      Anticipated Discharge Disposition Home or Self Care (P)      What phone number can be called within the next 1-3 days to see how you are doing after discharge? [phone] (P)      Hospital Resources/Appts/Education Provided Provided patient/caregiver with written discharge plan information;Provided education on problems/symptoms using teachback;Appointments scheduled and added to AVS (P)         Post-Acute Status    Discharge Delays None known at this time (P)                      Important Message from Medicare                  no

## 2024-09-12 NOTE — PROGRESS NOTES
[facility] - Med Surg ([address])  Wound Care    Patient Name:  Camila Adan   MRN:  [id number]  Date: 9/12/2024  Diagnosis: Upper GI bleed    History:     Past Medical History:   Diagnosis Date    Allergy     Bronchitis     Fatty liver 8/16/2022       Social History     Socioeconomic History    Marital status: Single   Tobacco Use    Smoking status: Every Day     Current packs/day: 0.50     Average packs/day: 0.5 packs/day for 12.0 years (6.0 ttl pk-yrs)     Types: Cigarettes    Smokeless tobacco: Never   Substance and Sexual Activity    Alcohol use: Yes     Alcohol/week: 4.0 standard drinks of alcohol     Types: 4 Glasses of wine per week     Comment: Socially    Drug use: No    Sexual activity: Not Currently     Partners: Male     Birth control/protection: Post-menopausal     Social Determinants of Health     Financial Resource Strain: Low Risk  (9/10/2024)    Overall Financial Resource Strain (CARDIA)     Difficulty of Paying Living Expenses: Not hard at all   Food Insecurity: No Food Insecurity (9/10/2024)    Hunger Vital Sign     Worried About Running Out of Food in the Last Year: Never true     Ran Out of Food in the Last Year: Never true   Transportation Needs: No Transportation Needs (9/10/2024)    TRANSPORTATION NEEDS     Transportation : No   Physical Activity: Inactive (9/10/2024)    Exercise Vital Sign     Days of Exercise per Week: 0 days     Minutes of Exercise per Session: 0 min   Stress: No Stress Concern Present (9/10/2024)    [employer] - Occupational Stress Questionnaire     Feeling of Stress : Only a little   Housing Stability: Low Risk  (9/10/2024)    Housing Stability Vital Sign     Unable to Pay for Housing in the Last Year: No     Homeless in the Last Year: No       Precautions:     Allergies as of 09/10/2024    (No Known Allergies)       WOC Assessment Details/Treatment     Wound care follow up:     Completed wound care as ordered. Wound to left lateral thigh  improving. Recommend continuing with orders in place. Supplies given to pt to take upon discharge. Will follow.        09/12/24 1004        Wound 09/10/24 0316 Ulceration Right plantar Greater trochanter   Date First Assessed/Time First Assessed: 09/10/24 0316   Present on Original Admission: Yes  Primary Wound Type: Ulceration  Side: Right  Orientation: plantar  Location: Greater trochanter   Wound Image    Drainage Amount Small   Drainage Characteristics/Odor Creamy;Serosanguineous;No odor   Appearance Pink;Red;Moist   Tissue loss description Full thickness   Periwound Area Dry   Wound Edges Open   Care Cleansed with:;Antimicrobial agent   Dressing Applied;Hydrofiber;Silicone;Foam       09/12/2024

## 2024-09-12 NOTE — PLAN OF CARE
Problem: Adult Inpatient Plan of Care  Goal: Plan of Care Review  Outcome: Progressing  Flowsheets (Taken 9/12/2024 1005)  Plan of Care Reviewed With: patient  Goal: Optimal Comfort and Wellbeing  Outcome: Progressing  Intervention: Monitor Pain and Promote Comfort  Flowsheets (Taken 9/12/2024 1005)  Pain Management Interventions:   around-the-clock dosing utilized   care clustered   quiet environment facilitated   relaxation techniques promoted  Intervention: Provide Person-Centered Care  Flowsheets (Taken 9/12/2024 1005)  Trust Relationship/Rapport:   care explained   choices provided   questions answered   questions encouraged   thoughts/feelings acknowledged     Problem: Gastrointestinal Bleeding  Goal: Optimal Coping with Acute Illness  Outcome: Progressing  Intervention: Optimize Psychosocial Response  Flowsheets (Taken 9/12/2024 1005)  Supportive Measures:   active listening utilized   decision-making supported   relaxation techniques promoted     Problem: Skin Injury Risk Increased  Goal: Skin Health and Integrity  Outcome: Progressing  Intervention: Optimize Skin Protection  Flowsheets (Taken 9/12/2024 1005)  Pressure Reduction Techniques: frequent weight shift encouraged  Activity Management: Arm raise - L1

## 2024-09-12 NOTE — NURSING
Patient discharged home. IV (x1), biobeat, and telemetry removed. Patient tolerated well. Discharge instructions and education provided to patient. Patient verbalized understanding. All patient belongings packed in bags to be taken home with patient. Patient awaiting prescription delivery to bedside and family transport. Patient instructed to call RN/Nursing station when prescriptions are received and family arrives for wheelchair escort off unit to family vehicle. Patient verbalized understanding.

## 2024-09-13 RX ORDER — POTASSIUM CHLORIDE 750 MG/1
CAPSULE, EXTENDED RELEASE ORAL
Qty: 90 CAPSULE | Refills: 3 | OUTPATIENT
Start: 2024-09-13

## 2024-09-13 NOTE — TELEPHONE ENCOUNTER
No care due was identified.  [facility] Embedded Care Due Messages. Reference number: [id number].   9/13/2024 8:03:46 AM CDT

## 2024-09-13 NOTE — TELEPHONE ENCOUNTER
Refill Decision Note   Camila Adan  is requesting a refill authorization.  Brief Assessment and Rationale for Refill:  Quick Discontinue     Medication Therapy Plan:  Stop Taking at Discharge.      Comments:     Note composed:1:50 PM 09/13/2024

## 2024-09-13 NOTE — TELEPHONE ENCOUNTER
----- Message from Fabián Montiel sent at 9/12/2024  3:15 PM CDT -----  Contact: [phone]@patient  Good afternoon patient would like a call back to discuss getting a hospital follow up apt. Patient is being discharged on today 9/12. Pleaser call patient to advise  [phone]

## 2024-09-14 NOTE — DISCHARGE SUMMARY
[facility] Surg [address] Medicine  Discharge Summary      Patient Name: Camila Adan  MRN: [id number]  JUJU: [id number]  Patient Class: OP- Observation  Admission Date: 9/10/2024  Hospital Length of Stay: 1 days  Discharge Date and Time: 9/12/2024  4:49 PM  Attending Physician: No att. providers found   Discharging Provider: Moiz Ann MD  Primary Care Provider: Orestes Hancock MD    Primary Care Team: Networked reference to record PCT     HPI:   The patient is a 53-y/o with a past medical history of steatotic liver disease who presented to [facility] with abd pain, dark black emesis, and melena. CTA abdomen and pelvis showed mild nonspecific stranding in the RUQ adjacent to the duodenal and pancreatic head. Findings favored to be related to low-grade duodenitis vs duodenal ulcer. No evidence of intraluminal contrast extravasation. CTA chest neg for PE. 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  The patient was accepted in consult with Gastroenterology for transfer to [facility] for further management.    Procedure(s) (LRB):  EGD (ESOPHAGOGASTRODUODENOSCOPY) (N/A)      Goals of Care Treatment Preferences:  Code Status: Full Code      SDOH Screening:  The patient was screened for utility difficulties, food insecurity, transport difficulties, housing insecurity, and interpersonal safety and there were no concerns identified this admission.     Consults:   Consults (From admission, onward)          Status Ordering Provider     Inpatient consult to Gastroenterology  Once        Provider:  Quan Vincent MD    Completed AGATHA GALDAMEZ          Hospital Course By Problem:   * Upper GI bleed  -Placed in observation  -Presented with abdominal pain, melena and dark black emesis  -CTA abdomen/pelvis showed mild non-specific stranding in RUQ adjacent to duodenal and pancreatic head without  intraluminal contrast extravasation.  -Hb 11.0 on admit and today down to 9.5  -GI consulted and input appreciated.  -Taken for EGD which showed normal esophagus, one non-bleeding gastric ulcer with pigmented material (Biopsied), one non-bleeding duodenal ulcer with no stigmata of bleeding  -Trended H/H and transfuse to keep Hb > 7.  -She was seen and examined and is doing well.  No further bleeding and Hb is 9.2.  She is medically cleared for discharge.  -Continue bid PPI , avoid NSAIDS.  Will need repeat upper endoscopy in 8 weeks to check healing.  Follow up with GI within 2 weeks for pathology.     Gastric ulcer  -Treatment as above.     Duodenal ulcer  -Treatment as above.     Tobacco dependency  -Counselled on cessation 6 minutes.  -NRT ordered.    Final Active Diagnoses:    Diagnosis Date Noted POA    PRINCIPAL PROBLEM:  Upper GI bleed [K92.2] 09/10/2024 Yes    Gastric ulcer [K25.9] 09/10/2024 Yes    Duodenal ulcer [K26.9] 09/10/2024 Yes    Tobacco dependency [F17.200] 09/10/2024 Yes      Problems Resolved During this Admission:       Discharged Condition: stable    Disposition: Home or Self Care    Follow Up:    Patient Instructions:      Ambulatory referral/consult to Gastroenterology   Standing Status: Future   Referral Priority: Routine Referral Type: Consultation   Referral Reason: Specialty Services Required   Requested Specialty: Gastroenterology   Number of Visits Requested: 1     Diet Cardiac     Notify your health care provider if you experience any of the following:  increased confusion or weakness     Notify your health care provider if you experience any of the following:  persistent dizziness, light-headedness, or visual disturbances     Notify your health care provider if you experience any of the following:  worsening rash     Notify your health care provider if you experience any of the following:  severe persistent headache     Notify your health care provider if you experience any of the  "following:  difficulty breathing or increased cough     Notify your health care provider if you experience any of the following:  severe uncontrolled pain     Notify your health care provider if you experience any of the following:  persistent nausea and vomiting or diarrhea     Notify your health care provider if you experience any of the following:  temperature >100.4     Activity as tolerated       Significant Diagnostic Studies: Labs: BMP: No results for input(s): "GLU", "NA", "K", "CL", "CO2", "BUN", "CREATININE", "CALCIUM", "MG" in the last 48 hours., CMP No results for input(s): "NA", "K", "CL", "CO2", "GLU", "BUN", "CREATININE", "CALCIUM", "PROT", "ALBUMIN", "BILITOT", "ALKPHOS", "AST", "ALT", "ANIONGAP", "ESTGFRAFRICA", "EGFRNONAA" in the last 48 hours., CBC No results for input(s): "WBC", "HGB", "HCT", "PLT" in the last 48 hours., INR   Lab Results   Component Value Date    INR 0.9 08/16/2022   , Lipid Panel   Lab Results   Component Value Date    CHOL 206 (H) 08/20/2024    HDL 43 08/20/2024    LDLCALC 120.0 08/20/2024    TRIG 215 (H) 08/20/2024    CHOLHDL 20.9 08/20/2024   , Troponin   Recent Labs   Lab 09/09/24  1432   TROPONINI 0.010   , and A1C:   Recent Labs   Lab 08/20/24  0956   HGBA1C 5.4       Pending Diagnostic Studies:       Procedure Component Value Units Date/Time    Specimen to Pathology, Surgery Gastrointestinal tract [[id number]] Collected: 09/10/24 0815    Order Status: Sent Lab Status: In process Updated: 09/10/24 0958    Specimen: Tissue            Medications:  Reconciled Home Medications:      Medication List        START taking these medications      nicotine 14 mg/24 hr  Commonly known as: NICODERM CQ  Place 1 patch onto the skin once daily.     pantoprazole 40 MG tablet  Commonly known as: PROTONIX  Take one tablet by mouth twice daily for 30 days then take one tablet by mouth once daily for 30 days     sucralfate 100 mg/mL suspension  Commonly known as: CARAFATE  Take 10 mLs (1 g " total) by mouth 4 (four) times daily before meals and nightly.            CONTINUE taking these medications      (MAGIC MOUTHWASH) 1:1:1 BENADRYL 12.5MG/5ML LIQ, ALUMINUM & MAGNESIUM  Swish and spit every 4-6 hours as needed for mouth sores     albuterol 90 mcg/actuation inhaler  Commonly known as: PROVENTIL/VENTOLIN HFA  Inhale 2 puffs into the lungs every 6 (six) hours as needed for Wheezing. Rescue     alendronate 70 MG tablet  Commonly known as: FOSAMAX  TAKE ONE TABLET BY MOUTH EVERY 7 DAYS     betamethasone valerate 0.1% 0.1 % Crea  Commonly known as: VALISONE  APPLY TOPICALLY TWICE DAILY     butalbital-acetaminophen-caffeine -40 mg -40 mg per tablet  Commonly known as: FIORICET, ESGIC  TAKE ONE TABLET BY MOUTH ONCE DAILY FOR HEADACHE     cyanocobalamin 1,000 mcg/mL injection  inject ONE ML under the skin every WEEK FOR 10 WEEKS THEN ONE ML monthly     cyclobenzaprine 10 MG tablet  Commonly known as: FLEXERIL  1 p.o. q.8 hours p.r.n. muscle spasm     diclofenac 75 MG EC tablet  Commonly known as: VOLTAREN  TAKE ONE TABLET BY MOUTH TWICE DAILY FOR LEG PAIN. DO NOT TAKE WITH ANY OTHER NSAIDS. CAN TAKE WITH TYLENOL.     DULoxetine 60 MG capsule  Commonly known as: CYMBALTA  TAKE ONE CAPSULE BY MOUTH ONCE DAILY     ergocalciferol 50,000 unit Cap  Commonly known as: ERGOCALCIFEROL  Take 1 capsule (50,000 Units total) by mouth every 30 days. TAKE ONE CAPSULE BY MOUTH every SEVEN DAYS     gabapentin 800 MG tablet  Commonly known as: NEURONTIN  TAKE ONE TABLET BY MOUTH THREE TIMES DAILY     sumatriptan 50 MG tablet  Commonly known as: IMITREX  Take 1 tablet (50 mg total) by mouth every 2 (two) hours as needed for Migraine. Do not exceed 4 tablets (200 mg) within a 24 hour period.     tiZANidine 4 MG tablet  Commonly known as: ZANAFLEX  TAKE ONE TABLET BY MOUTH EVERY 8 HOURS AS NEEDED FOR MUSCLE SPASMS            STOP taking these medications      cephALEXin 500 MG capsule  Commonly known as: KEFLEX      ciprofloxacin-dexAMETHasone 0.3-0.1% 0.3-0.1 % Drps  Commonly known as: CIPRODEX     IRON-C-FOLATE 6-I04-JO-STOMACH ORAL     potassium chloride 10 MEQ Cpsr  Commonly known as: MICRO-K            ASK your doctor about these medications      losartan 50 MG tablet  Commonly known as: COZAAR  TAKE ONE TABLET BY MOUTH ONCE DAILY              Indwelling Lines/Drains at time of discharge:   Lines/Drains/Airways       None                   Time spent on the discharge of patient: 35 minutes         Moiz Ann MD  Department of Hospital Medicine  [facility] Surg ([address])

## 2024-09-17 LAB
FINAL PATHOLOGIC DIAGNOSIS: NORMAL
GROSS: NORMAL
Lab: NORMAL

## 2024-09-19 ENCOUNTER — PATIENT MESSAGE (OUTPATIENT)
Dept: PRIMARY CARE CLINIC | Facility: CLINIC | Age: 54
End: 2024-09-19
Payer: MEDICAID

## 2024-09-20 ENCOUNTER — TELEPHONE (OUTPATIENT)
Dept: NEUROLOGY | Facility: CLINIC | Age: 54
End: 2024-09-20
Payer: MEDICAID

## 2024-09-20 NOTE — TELEPHONE ENCOUNTER
Spoke with patient, neurology appt not currently available in [place] or [facility] locations [place], advised pt to contact her insurance for a list of neurology providers.

## 2024-09-20 NOTE — TELEPHONE ENCOUNTER
"----- Message from Rex Yanez sent at 9/20/2024  1:00 PM CDT -----  Regarding: call back  Consult/Advisory:        Name Of Caller: Self     Contact Preference?:[phone]     What is the nature of the call?: Calling to speak w/ someone in regards to scheduling an appt from referral requesting call back      Additional Notes:  "Thank you for all that you do for our patients"  "

## 2024-09-23 ENCOUNTER — OFFICE VISIT (OUTPATIENT)
Dept: PRIMARY CARE CLINIC | Facility: CLINIC | Age: 54
End: 2024-09-23
Payer: MEDICAID

## 2024-09-23 VITALS
OXYGEN SATURATION: 96 % | BODY MASS INDEX: 38.49 KG/M2 | DIASTOLIC BLOOD PRESSURE: 76 MMHG | HEIGHT: 66 IN | RESPIRATION RATE: 18 BRPM | HEART RATE: 99 BPM | SYSTOLIC BLOOD PRESSURE: 118 MMHG | WEIGHT: 239.5 LBS

## 2024-09-23 DIAGNOSIS — K25.0 ACUTE GASTRIC ULCER WITH HEMORRHAGE: Primary | ICD-10-CM

## 2024-09-23 DIAGNOSIS — J98.01 BRONCHOSPASM: ICD-10-CM
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
## 2024-09-23 DIAGNOSIS — F17.200 TOBACCO DEPENDENCY: ICD-10-CM

## 2024-09-23 DIAGNOSIS — K26.9 DUODENAL ULCER: ICD-10-CM

## 2024-09-23 DIAGNOSIS — R51.9 NONINTRACTABLE EPISODIC HEADACHE, UNSPECIFIED HEADACHE TYPE: ICD-10-CM

## 2024-09-23 DIAGNOSIS — E53.8 FOLIC ACID DEFICIENCY: ICD-10-CM

## 2024-09-23 DIAGNOSIS — G62.9 NEUROPATHY: ICD-10-CM

## 2024-09-23 DIAGNOSIS — Z74.09 IMPAIRED FUNCTIONAL MOBILITY, BALANCE, GAIT, AND ENDURANCE: ICD-10-CM

## 2024-09-23 PROCEDURE — 99999 PR PBB SHADOW E&M-EST. PATIENT-LVL IV: CPT | Mod: PBBFAC,,, | Performed by: FAMILY MEDICINE

## 2024-09-23 PROCEDURE — 99214 OFFICE O/P EST MOD 30 MIN: CPT | Mod: PBBFAC,PN | Performed by: FAMILY MEDICINE

## 2024-09-23 PROCEDURE — 4010F ACE/ARB THERAPY RXD/TAKEN: CPT | Mod: CPTII,,, | Performed by: FAMILY MEDICINE

## 2024-09-23 PROCEDURE — 1159F MED LIST DOCD IN RCRD: CPT | Mod: CPTII,,, | Performed by: FAMILY MEDICINE

## 2024-09-23 PROCEDURE — 3044F HG A1C LEVEL LT 7.0%: CPT | Mod: CPTII,,, | Performed by: FAMILY MEDICINE

## 2024-09-23 PROCEDURE — 3074F SYST BP LT 130 MM HG: CPT | Mod: CPTII,,, | Performed by: FAMILY MEDICINE

## 2024-09-23 PROCEDURE — 1111F DSCHRG MED/CURRENT MED MERGE: CPT | Mod: CPTII,,, | Performed by: FAMILY MEDICINE

## 2024-09-23 PROCEDURE — 99214 OFFICE O/P EST MOD 30 MIN: CPT | Mod: S$PBB,,, | Performed by: FAMILY MEDICINE

## 2024-09-23 PROCEDURE — 3078F DIAST BP <80 MM HG: CPT | Mod: CPTII,,, | Performed by: FAMILY MEDICINE

## 2024-09-23 PROCEDURE — 3008F BODY MASS INDEX DOCD: CPT | Mod: CPTII,,, | Performed by: FAMILY MEDICINE

## 2024-09-23 RX ORDER — LORAZEPAM 1 MG/1
TABLET ORAL
Qty: 30 TABLET | Refills: 5 | Status: SHIPPED | OUTPATIENT
Start: 2024-09-23

## 2024-09-23 RX ORDER — BUTALBITAL, ACETAMINOPHEN AND CAFFEINE 50; 325; 40 MG/1; MG/1; MG/1
TABLET ORAL
Qty: 30 TABLET | Refills: 2 | Status: SHIPPED | OUTPATIENT
Start: 2024-09-23
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
## 2024-09-24 ENCOUNTER — PATIENT MESSAGE (OUTPATIENT)
Dept: PRIMARY CARE CLINIC | Facility: CLINIC | Age: 54
End: 2024-09-24
Payer: MEDICAID

## 2024-09-24 RX ORDER — ERGOCALCIFEROL 1.25 MG/1
CAPSULE ORAL
Qty: 3 CAPSULE | Refills: 3 | Status: SHIPPED | OUTPATIENT
Start: 2024-09-24

## 2024-09-24 NOTE — TELEPHONE ENCOUNTER
Care Due:                  Date            Visit Type   Department     Provider  --------------------------------------------------------------------------------                                EP -                              PRIMARY [facility]  Last Visit: 09-      CARE ([facility])   PRIMARY CARE   Orestes Hancock                              EP - PRIMARY [facility]  Next Visit: 12-      CARE ([facility])   [facility]   Orestes Hancock                                                            Last  Test          Frequency    Reason                     Performed    Due Date  --------------------------------------------------------------------------------    Mg Level....  12 months..  alendronate..............  Not Found    Overdue    Phosphate...  12 months..  alendronate..............  Not Found    Overdue    [employer] Embedded Care Due Messages. Reference number: [id number].   9/24/2024 8:03:03 AM CDT

## 2024-10-06 RX ORDER — LOSARTAN POTASSIUM 50 MG/1
50 TABLET ORAL
Qty: 90 TABLET | Refills: 3 | OUTPATIENT
Start: 2024-10-06

## 2024-10-06 NOTE — TELEPHONE ENCOUNTER
No care due was identified.  Health [facility] Embedded Care Due Messages. Reference number: [id number].   10/06/2024 8:03:14 AM CDT

## 2024-10-06 NOTE — TELEPHONE ENCOUNTER
Refill Decision Note   Camila Adan  is requesting a refill authorization.  Brief Assessment and Rationale for Refill:  Quick Discontinue     Medication Therapy Plan:  discontinued 9/23/24 by Orestes Hancock MD; Patient no longer taking      Comments:     Note composed:4:16 PM 10/06/2024

## 2024-10-08 RX ORDER — GABAPENTIN 600 MG/1
600 TABLET ORAL 3 TIMES DAILY
Qty: 90 TABLET | Refills: 2 | Status: SHIPPED | OUTPATIENT
Start: 2024-10-08

## 2024-10-08 NOTE — TELEPHONE ENCOUNTER
No care due was identified.  Health [facility] Embedded Care Due Messages. Reference number: [id number].   10/08/2024 12:12:29 PM CDT

## 2024-10-10 NOTE — TELEPHONE ENCOUNTER
No care due was identified.  [facility] Embedded Care Due Messages. Reference number: [id number].   10/10/2024 3:24:54 PM CDT

## 2024-10-10 NOTE — TELEPHONE ENCOUNTER
----- Message from Jenn sent at 10/10/2024  2:18 PM CDT -----  Contact: Patient, [phone]  Calling regarding Ex gabapentin (NEURONTIN) 600 MG tablet, patient states she takes 800 mg. Please correct. Thanks,.        [facility] - [place], [place] - [address] [address]  Phone: [phone] Fax: [phone]

## 2024-10-11 RX ORDER — CITALOPRAM 10 MG/1
10 TABLET ORAL
Qty: 30 TABLET | Refills: 11 | OUTPATIENT
Start: 2024-10-11

## 2024-10-11 RX ORDER — GABAPENTIN 800 MG/1
800 TABLET ORAL 3 TIMES DAILY
Qty: 90 TABLET | Refills: 0 | OUTPATIENT
Start: 2024-10-11

## 2024-10-11 NOTE — TELEPHONE ENCOUNTER
No care due was identified.  Health [facility] Embedded Care Due Messages. Reference number: [id number].   10/11/2024 11:50:26 AM CDT

## 2024-10-11 NOTE — TELEPHONE ENCOUNTER
Refill Decision Note   Camila Adan  is requesting a refill authorization.  Brief Assessment and Rationale for Refill:  Quick Discontinue     Medication Therapy Plan:  The original prescription was discontinued on 7/29/2024 by Orestes Hancock MD.      Comments:     Note composed:4:24 PM 10/11/2024

## 2024-10-16 RX ORDER — TIZANIDINE 4 MG/1
TABLET ORAL
Qty: 90 TABLET | Refills: 3 | Status: SHIPPED | OUTPATIENT
Start: 2024-10-16

## 2024-10-16 NOTE — TELEPHONE ENCOUNTER
No care due was identified.  Health [facility] Embedded Care Due Messages. Reference number: [id number].   10/16/2024 11:03:34 AM CDT

## 2024-11-19 ENCOUNTER — OFFICE VISIT (OUTPATIENT)
Dept: OBSTETRICS AND GYNECOLOGY | Facility: CLINIC | Age: 54
End: 2024-11-19
Payer: MEDICAID

## 2024-11-19 VITALS
HEIGHT: 66 IN | BODY MASS INDEX: 36.49 KG/M2 | SYSTOLIC BLOOD PRESSURE: 125 MMHG | WEIGHT: 227.06 LBS | DIASTOLIC BLOOD PRESSURE: 83 MMHG

## 2024-11-19 DIAGNOSIS — R10.2 PELVIC PAIN: Primary | ICD-10-CM

## 2024-11-19 DIAGNOSIS — Z12.4 SCREENING FOR MALIGNANT NEOPLASM OF CERVIX: ICD-10-CM

## 2024-11-19 PROCEDURE — 87491 CHLMYD TRACH DNA AMP PROBE: CPT | Performed by: STUDENT IN AN ORGANIZED HEALTH CARE EDUCATION/TRAINING PROGRAM

## 2024-11-19 PROCEDURE — 3074F SYST BP LT 130 MM HG: CPT | Mod: CPTII,,, | Performed by: STUDENT IN AN ORGANIZED HEALTH CARE EDUCATION/TRAINING PROGRAM

## 2024-11-19 PROCEDURE — 4010F ACE/ARB THERAPY RXD/TAKEN: CPT | Mod: CPTII,,, | Performed by: STUDENT IN AN ORGANIZED HEALTH CARE EDUCATION/TRAINING PROGRAM

## 2024-11-19 PROCEDURE — 87624 HPV HI-RISK TYP POOLED RSLT: CPT | Performed by: STUDENT IN AN ORGANIZED HEALTH CARE EDUCATION/TRAINING PROGRAM

## 2024-11-19 PROCEDURE — 99203 OFFICE O/P NEW LOW 30 MIN: CPT | Mod: S$PBB,,, | Performed by: STUDENT IN AN ORGANIZED HEALTH CARE EDUCATION/TRAINING PROGRAM

## 2024-11-19 PROCEDURE — 1160F RVW MEDS BY RX/DR IN RCRD: CPT | Mod: CPTII,,, | Performed by: STUDENT IN AN ORGANIZED HEALTH CARE EDUCATION/TRAINING PROGRAM

## 2024-11-19 PROCEDURE — 3044F HG A1C LEVEL LT 7.0%: CPT | Mod: CPTII,,, | Performed by: STUDENT IN AN ORGANIZED HEALTH CARE EDUCATION/TRAINING PROGRAM

## 2024-11-19 PROCEDURE — 99214 OFFICE O/P EST MOD 30 MIN: CPT | Mod: PBBFAC | Performed by: STUDENT IN AN ORGANIZED HEALTH CARE EDUCATION/TRAINING PROGRAM

## 2024-11-19 PROCEDURE — 3079F DIAST BP 80-89 MM HG: CPT | Mod: CPTII,,, | Performed by: STUDENT IN AN ORGANIZED HEALTH CARE EDUCATION/TRAINING PROGRAM

## 2024-11-19 PROCEDURE — 1159F MED LIST DOCD IN RCRD: CPT | Mod: CPTII,,, | Performed by: STUDENT IN AN ORGANIZED HEALTH CARE EDUCATION/TRAINING PROGRAM

## 2024-11-19 PROCEDURE — 3008F BODY MASS INDEX DOCD: CPT | Mod: CPTII,,, | Performed by: STUDENT IN AN ORGANIZED HEALTH CARE EDUCATION/TRAINING PROGRAM

## 2024-11-19 PROCEDURE — 99999 PR PBB SHADOW E&M-EST. PATIENT-LVL IV: CPT | Mod: PBBFAC,,, | Performed by: STUDENT IN AN ORGANIZED HEALTH CARE EDUCATION/TRAINING PROGRAM

## 2024-11-19 NOTE — PROGRESS NOTES
Subjective     Patient ID: Camila Adan is a 53 y.o. female.    Chief Complaint:  Abdominal Pain (LLQ, feels like she may have a cyst on her ovary. Gets a sharp pain 1-2x a week.)      History of Present Illness  52 yo     Reports a few months of pain sharp LLQ pain that comes on suddenly and lasts for about 2 minutes at the most. Does not travel or radiate  Does not notice any associated factors, sitting up and holding the area makes the pain better. This has not happened to her before     Denies any dysuria or hematuria. Has a BM every other day, sometimes has to work to pass them.   Cannot recall any accidents or injuries that may be related to this pain     Went through menopause around 49. Had an episode of spotting once since then but not recently       {OBG HPI BLOCKS:85968}    GYN & OB History  Patient's last menstrual period was 2021.   Date of Last Pap: No result found    OB History    Para Term  AB Living   3 3 3         SAB IAB Ectopic Multiple Live Births                  # Outcome Date GA Lbr Janusz/2nd Weight Sex Type Anes PTL Lv   3 Term            2 Term            1 Term                Review of Systems  Review of Systems       Objective   Physical Exam         Assessment and Plan     No diagnosis found.   ***      Plan:  ***

## 2024-11-19 NOTE — PATIENT INSTRUCTIONS
Fiber Information Sheet  Your doctor has recommended that you follow a high fiber diet. The addition of fiber to your diet can make an enormous difference in your bowel control and regularity. Fiber helps people whether they lose stool or have trouble with constipation. Fiber works by bulking the stool and keeping it formed, yet making the movement soft and easy to pass. Fiber helps keep moisture within the stool so that neither diarrhea nor hard stool occurs. Fiber makes the bowels work more regularly, but it is not a laxative. An additional bonus from eating a high fiber diet is that your risk of cancer is reduced, too. Most of us eat some high fiber foods already, but nearly all of us do not eat the necessary amount. For example, a slice of whole wheat bread contains only about 10% of the daily recommended amount of fiber. This means if you are relying on only whole wheat bread to meet the recommended fiber requirements, you would need to eat between 10-18 slices every day!     Please note that fiber is NOT in any meat or dairy product. It is only found in grains, vegetables and fruits. The recommended daily fiber intake is 20-25 grams. Foods having high fiber  content include:  Fiber One Cereal, ½ cup 13.0 g  Harrison beans, ¾ cup 10.4 g  Wheat bran cereal, 1 oz 10.0 g  Kidney beans, ¾ cup 9.3 g  All Bran Cereal, ½ cup 6.0 g  Oat Bran Cereal, hot, 1 oz 4.0 g  Banana, 1medium 3.8 g  Canned pears, ½ cup 3.7 g  3 prunes or ¼ cup raisins 3.5 g  Whole Wheat Total, 1 cup 3.0 g  Carrots, ½ cup 3.2 g  Apple, small 2.8 g  Broccoli, ½ cup 2.8 g  Cauliflower, ½ cup 2.6 g  Oatmeal, 1 oz 2.5 g  Whole Wheat Toast 2.0 g  Cheerios, 1 1/3 cup 2.0 g  Baked potato with skin 2.0 g  Corn, ½ cup 1.9 g  Popcorn, 3 cups 1.9 g  Orange, medium 1.9 g  Granola bar 1.0 g  Lettuce, ½ cup 0.9 g    If you dont think that you can get enough fiber through your everyday diet, there are many good fiber supplements you can take along with eating your  high fiber diet. Some of these are:  - Metamucil (1 heaping teaspoon or 1-2 wafers)  - Citrucel (1 tablespoon)   - Fiberall (1-2 wafers or 1 teaspoon)  - Perdium (2 rounded teaspoons) and 1-2 teaspoons unprocessed bran (to mix with foods)  You may need to use the fiber supplement up to 3-4 times daily to produce normal elimination. Please follow specific package directions or call us for help in regulating the dose. You may notice some bloating and/or increased gas at first. These symptoms can be relieved by adding fiber to your diet slowly. Once your body gets used to this increased fiber, these symptoms will go away.

## 2024-11-19 NOTE — PROGRESS NOTES
Subjective     Patient ID: Camila Adan is a 53 y.o. female.    Chief Complaint:  Abdominal Pain (LLQ, feels like she may have a cyst on her ovary. Gets a sharp pain 1-2x a week.)      History of Present Illness  54 yo  presents for abdominal pain evaluation     Reports a few months of pain sharp LLQ pain that comes on suddenly and lasts for about 2 minutes at the most. Does not travel or radiate  Does not notice any associated factors, sitting up and holding the area makes the pain better. This has not happened to her before     Denies any dysuria or hematuria. Has a BM every other day, sometimes has to work to pass them.   Cannot recall any accidents or injuries that may be related to this pain      Went through menopause around 49. Had an episode of spotting once since then but not recently. Not sexually active        GYN & OB History  Patient's last menstrual period was 2021.   Date of Last Pap: No result found    OB History    Para Term  AB Living   5 3 3   2     SAB IAB Ectopic Multiple Live Births                  # Outcome Date GA Lbr Janusz/2nd Weight Sex Type Anes PTL Lv   5 AB            4 AB            3 Term            2 Term            1 Term                Review of Systems  Review of Systems   All other systems reviewed and are negative.         Objective   Physical Exam:   Constitutional: She appears well-developed and well-nourished.    HENT:   Head: Normocephalic and atraumatic.    Eyes: EOM are normal.      Pulmonary/Chest: Effort normal.        Abdominal: Soft. There is abdominal tenderness in the left lower quadrant.   Knot/firmness under skin palpated in LLQ near groin crease, tender on bimanual exam     Genitourinary:    Vagina, uterus, right adnexa and left adnexa normal.      Pelvic exam was performed with patient in the lithotomy position.   The external female genitalia was normal.   Genitalia hair distrobution normal .   There is no lesion on the right labia.  There is no lesion on the left labia. Cervix is normal. Cervix exhibits friability.    Genitourinary Comments: Female chaperone present for duration of exam             Musculoskeletal: Normal range of motion.       Neurological: She is alert.    Skin: Skin is warm and dry.    Psychiatric: She has a normal mood and affect.            Assessment and Plan     1. Pelvic pain    2. Screening for malignant neoplasm of cervix          Plan:   1. Pelvic pain (Primary)  - US Pelvis Comp with Transvag NON-OB (xpd; Future  - C. trachomatis/N. gonorrhoeae by AMP DNA [facility]; Cervix  - Ambulatory referral/consult to Physical/Occupational Therapy; Future  - US Abdomen Limited; Future    2. Screening for malignant neoplasm of cervix  - Liquid-Based Pap Smear, Screening  - HPV High Risk Genotypes, PCR     Follow up as needed/WWE in 1 year    Gil Baker III, MD  [facility] [place] - OB GYN   [address]  [place] [address]   [phone]

## 2024-11-21 LAB
C TRACH DNA SPEC QL NAA+PROBE: NOT DETECTED
N GONORRHOEA DNA SPEC QL NAA+PROBE: NOT DETECTED

## 2024-11-23 LAB
FINAL PATHOLOGIC DIAGNOSIS: NORMAL
Lab: NORMAL

## 2024-11-25 DIAGNOSIS — Z20.2 TRICHOMONAS CONTACT: Primary | ICD-10-CM

## 2024-11-25 RX ORDER — METRONIDAZOLE 500 MG/1
TABLET ORAL
Qty: 14 TABLET | Refills: 0 | Status: SHIPPED | OUTPATIENT
Start: 2024-11-25

## 2024-12-05 ENCOUNTER — TELEPHONE (OUTPATIENT)
Dept: OBSTETRICS AND GYNECOLOGY | Facility: CLINIC | Age: 54
End: 2024-12-05
Payer: MEDICAID

## 2024-12-05 NOTE — TELEPHONE ENCOUNTER
----- Message from Tanesha Parker PA-C sent at 12/5/2024  2:38 PM CST -----  Please notify  pt she is positive for trichomoniasis.  This is a STD.  Flagyl sent to pharmacy.  Avoid alcohol while on the medication and for 24 hours after the last dose.  Notify all sexual partners within the past 60 days and to abstain from intercourse during treatment and for 7 days after the treatment. Please schedule pt for DEB in 3 months

## 2024-12-05 NOTE — TELEPHONE ENCOUNTER
Left VM asking the pt to contact the office back regarding results.   ----- Message from Tanesha Parker PA-C sent at 12/5/2024  2:38 PM CST -----  Please notify  pt she is positive for trichomoniasis.  This is a STD.  Flagyl sent to pharmacy.  Avoid alcohol while on the medication and for 24 hours after the last dose.  Notify all sexual partners within the past 60 days and to abstain from intercourse during treatment and for 7 days after the treatment. Please schedule pt for DEB in 3 months

## 2024-12-05 NOTE — TELEPHONE ENCOUNTER
Pt call was return in regards to her most recent Lab results..patient V/U all inform given and a DEB appt has been schedule following kg

## 2024-12-11 ENCOUNTER — HOSPITAL ENCOUNTER (OUTPATIENT)
Dept: RADIOLOGY | Facility: OTHER | Age: 54
Discharge: HOME | End: 2024-12-11
Attending: STUDENT IN AN ORGANIZED HEALTH CARE EDUCATION/TRAINING PROGRAM
Payer: MEDICAID

## 2024-12-11 DIAGNOSIS — R10.2 PELVIC PAIN: ICD-10-CM

## 2024-12-11 PROCEDURE — 76856 US EXAM PELVIC COMPLETE: CPT | Mod: TC

## 2024-12-11 PROCEDURE — 76705 ECHO EXAM OF ABDOMEN: CPT | Mod: 26,,, | Performed by: RADIOLOGY

## 2024-12-11 PROCEDURE — 76705 ECHO EXAM OF ABDOMEN: CPT | Mod: TC

## 2024-12-11 PROCEDURE — 76830 TRANSVAGINAL US NON-OB: CPT | Mod: 26,,, | Performed by: RADIOLOGY

## 2024-12-11 PROCEDURE — 76856 US EXAM PELVIC COMPLETE: CPT | Mod: 26,,, | Performed by: RADIOLOGY

## 2024-12-20 RX ORDER — ALENDRONATE SODIUM 70 MG/1
TABLET ORAL
Qty: 12 TABLET | Refills: 3 | Status: SHIPPED | OUTPATIENT
Start: 2024-12-20

## 2024-12-20 NOTE — TELEPHONE ENCOUNTER
Care Due:                  Date            Visit Type   Department     Provider  --------------------------------------------------------------------------------                                EP -                              PRIMARY [facility]  Last Visit: 09-      CARE ([facility])   PRIMARY CARE   Orestes Hancock                              EP - [facility]IMARY [facility]  Next Visit: 01-      CARE ([facility])   PRIMARY CARE   Orestes Hancock                                                            Last  Test          Frequency    Reason                     Performed    Due Date  --------------------------------------------------------------------------------    Mg Level....  12 months..  alendronate..............  Not Found    Overdue    Phosphate...  12 months..  alendronate..............  Not Found    Overdue    [employer] Embedded Care Due Messages. Reference number: [id number].   12/20/2024 8:03:46 AM CST

## 2024-12-20 NOTE — TELEPHONE ENCOUNTER
Refill Routing Note   Medication(s) are not appropriate for processing by [facility] for the following reason(s):        Outside of protocol    [facility] action(s):  Route               Appointments  past 12m or future 3m with PCP    Date Provider   Last Visit   9/23/2024 Orestes Hancock MD   Next Visit   1/6/2025 Orestes Hancock MD   ED visits in past 90 days: 0        Note composed:4:44 PM 12/20/2024

## 2024-12-29 DIAGNOSIS — R51.9 NONINTRACTABLE EPISODIC HEADACHE, UNSPECIFIED HEADACHE TYPE: ICD-10-CM

## 2024-12-29 NOTE — TELEPHONE ENCOUNTER
No care due was identified.  [facility] Embedded Care Due Messages. Reference number: [id number].   12/29/2024 8:03:16 AM CST

## 2024-12-30 RX ORDER — GABAPENTIN 600 MG/1
600 TABLET ORAL 3 TIMES DAILY
Qty: 90 TABLET | Refills: 2 | Status: SHIPPED | OUTPATIENT
Start: 2024-12-30

## 2024-12-30 RX ORDER — BUTALBITAL, ACETAMINOPHEN AND CAFFEINE 50; 325; 40 MG/1; MG/1; MG/1
TABLET ORAL
Qty: 30 TABLET | Refills: 2 | Status: SHIPPED | OUTPATIENT
Start: 2024-12-30

## 2025-01-03 ENCOUNTER — TELEPHONE (OUTPATIENT)
Dept: PRIMARY CARE CLINIC | Facility: CLINIC | Age: 55
End: 2025-01-03
Payer: MEDICAID

## 2025-01-03 DIAGNOSIS — Z12.31 ENCOUNTER FOR SCREENING MAMMOGRAM FOR MALIGNANT NEOPLASM OF BREAST: Primary | ICD-10-CM

## 2025-01-03 DIAGNOSIS — R29.898 RIGHT LEG WEAKNESS: ICD-10-CM

## 2025-01-03 NOTE — TELEPHONE ENCOUNTER
----- Message from Amie sent at 1/3/2025  1:07 PM CST -----  Patient needs a new walker hers is old and the wheel is coming off

## 2025-01-03 NOTE — TELEPHONE ENCOUNTER
----- Message from Stella sent at 1/3/2025 10:57 AM CST -----  Contact: [phone] Patient  Caller is requesting to schedule their annual screening mammogram appointment. Order is not listed in Epic.  Please enter order and contact patient to schedule.    Would the patient like a call back, or a response through their [facility] portal?:   call back    Where would they like the mammogram performed?: [facility]    Comments:

## 2025-01-07 ENCOUNTER — TELEPHONE (OUTPATIENT)
Dept: OBSTETRICS AND GYNECOLOGY | Facility: CLINIC | Age: 55
End: 2025-01-07
Payer: MEDICAID

## 2025-01-07 NOTE — TELEPHONE ENCOUNTER
Called and spoke to pt, relayed message from provider. Pt TAD [place]  [phone]    ----- Message from Gil Baker MD sent at 1/7/2025  2:48 PM CST -----  Please let Camila Adan know the ultrasound did not see anything suspicious in the groin. If pain is not improving or is worsening at that spot then the next imaging test would be a CT scan, please reach out to discuss as needed

## 2025-01-13 RX ORDER — DICLOFENAC SODIUM 75 MG/1
TABLET, DELAYED RELEASE ORAL
Qty: 60 TABLET | Refills: 5 | Status: SHIPPED | OUTPATIENT
Start: 2025-01-13

## 2025-01-13 NOTE — TELEPHONE ENCOUNTER
No care due was identified.  [facility] Embedded Care Due Messages. Reference number: [id number].   1/13/2025 9:43:53 AM CST

## 2025-01-24 ENCOUNTER — TELEPHONE (OUTPATIENT)
Dept: OBSTETRICS AND GYNECOLOGY | Facility: CLINIC | Age: 55
End: 2025-01-24
Payer: MEDICAID

## 2025-01-24 NOTE — TELEPHONE ENCOUNTER
Left VM asking the pt to contact the office regarding results.    ----- Message from Gil Baker MD sent at 1/18/2025  2:13 PM CST -----  Please let Camila Loco know her ultrasound did not show any masses such as fibroids or abnormalities within the uterus or ovaries

## 2025-01-29 ENCOUNTER — TELEPHONE (OUTPATIENT)
Dept: PRIMARY CARE CLINIC | Facility: CLINIC | Age: 55
End: 2025-01-29
Payer: MEDICAID

## 2025-01-29 NOTE — TELEPHONE ENCOUNTER
----- Message from Sayra sent at 1/29/2025  1:42 PM CST -----  Contact: [phone]  .1MEDICALADVICE     Patient is calling for Medical Advice regarding: pt wants to cancel lyft for 3:40 pm 2/4/25 please call    Patient wants a call back or thru [facility]: call    Comments:    Please advise patient replies from provider may take up to 48 hours.

## 2025-02-04 ENCOUNTER — HOSPITAL ENCOUNTER (OUTPATIENT)
Dept: RADIOLOGY | Facility: HOSPITAL | Age: 55
Discharge: HOME | End: 2025-02-04
Attending: FAMILY MEDICINE
Payer: MEDICAID

## 2025-02-04 ENCOUNTER — OFFICE VISIT (OUTPATIENT)
Dept: OBSTETRICS AND GYNECOLOGY | Facility: CLINIC | Age: 55
End: 2025-02-04
Payer: MEDICAID

## 2025-02-04 ENCOUNTER — OFFICE VISIT (OUTPATIENT)
Dept: PRIMARY CARE CLINIC | Facility: CLINIC | Age: 55
End: 2025-02-04
Payer: MEDICAID

## 2025-02-04 VITALS
SYSTOLIC BLOOD PRESSURE: 122 MMHG | DIASTOLIC BLOOD PRESSURE: 88 MMHG | HEIGHT: 66 IN | RESPIRATION RATE: 18 BRPM | OXYGEN SATURATION: 97 % | WEIGHT: 223.88 LBS | BODY MASS INDEX: 35.98 KG/M2 | HEART RATE: 112 BPM

## 2025-02-04 VITALS — WEIGHT: 220.44 LBS | HEIGHT: 66 IN | BODY MASS INDEX: 35.43 KG/M2

## 2025-02-04 DIAGNOSIS — G62.9 NEUROPATHY: ICD-10-CM

## 2025-02-04 DIAGNOSIS — E53.8 FOLIC ACID DEFICIENCY: ICD-10-CM

## 2025-02-04 DIAGNOSIS — G44.209 TENSION-TYPE HEADACHE, NOT INTRACTABLE, UNSPECIFIED CHRONICITY PATTERN: ICD-10-CM

## 2025-02-04 DIAGNOSIS — A59.01 TRICHOMONAL VULVOVAGINITIS: Primary | ICD-10-CM

## 2025-02-04 DIAGNOSIS — R51.9 NONINTRACTABLE EPISODIC HEADACHE, UNSPECIFIED HEADACHE TYPE: ICD-10-CM

## 2025-02-04 DIAGNOSIS — K26.9 DUODENAL ULCER: ICD-10-CM

## 2025-02-04 DIAGNOSIS — Z12.31 ENCOUNTER FOR SCREENING MAMMOGRAM FOR MALIGNANT NEOPLASM OF BREAST: ICD-10-CM

## 2025-02-04 DIAGNOSIS — M21.371 FOOT DROP, RIGHT: ICD-10-CM

## 2025-02-04 DIAGNOSIS — G62.9 POLYNEUROPATHY: Primary | ICD-10-CM

## 2025-02-04 DIAGNOSIS — R29.6 FALLS: ICD-10-CM

## 2025-02-04 DIAGNOSIS — Z72.0 TOBACCO ABUSE: ICD-10-CM

## 2025-02-04 DIAGNOSIS — E55.9 VITAMIN D DEFICIENCY: ICD-10-CM

## 2025-02-04 PROBLEM — H60.502 ACUTE OTITIS EXTERNA OF LEFT EAR: Status: RESOLVED | Noted: 2024-08-20 | Resolved: 2025-02-04

## 2025-02-04 PROBLEM — H60.552 ACUTE REACTIVE OTITIS EXTERNA OF LEFT EAR: Status: RESOLVED | Noted: 2022-01-24 | Resolved: 2025-02-04

## 2025-02-04 PROBLEM — F17.200 TOBACCO DEPENDENCY: Status: RESOLVED | Noted: 2024-09-10 | Resolved: 2025-02-04

## 2025-02-04 PROCEDURE — 99214 OFFICE O/P EST MOD 30 MIN: CPT | Mod: S$PBB,,, | Performed by: FAMILY MEDICINE

## 2025-02-04 PROCEDURE — 1159F MED LIST DOCD IN RCRD: CPT | Mod: CPTII,,, | Performed by: FAMILY MEDICINE

## 2025-02-04 PROCEDURE — 99999 PR PBB SHADOW E&M-EST. PATIENT-LVL III: CPT | Mod: PBBFAC,,, | Performed by: STUDENT IN AN ORGANIZED HEALTH CARE EDUCATION/TRAINING PROGRAM

## 2025-02-04 PROCEDURE — 77067 SCR MAMMO BI INCL CAD: CPT | Mod: 26,,, | Performed by: RADIOLOGY

## 2025-02-04 PROCEDURE — 99213 OFFICE O/P EST LOW 20 MIN: CPT | Mod: PBBFAC | Performed by: STUDENT IN AN ORGANIZED HEALTH CARE EDUCATION/TRAINING PROGRAM

## 2025-02-04 PROCEDURE — 3008F BODY MASS INDEX DOCD: CPT | Mod: CPTII,,, | Performed by: STUDENT IN AN ORGANIZED HEALTH CARE EDUCATION/TRAINING PROGRAM

## 2025-02-04 PROCEDURE — 77063 BREAST TOMOSYNTHESIS BI: CPT | Mod: 26,,, | Performed by: RADIOLOGY

## 2025-02-04 PROCEDURE — 3008F BODY MASS INDEX DOCD: CPT | Mod: CPTII,,, | Performed by: FAMILY MEDICINE

## 2025-02-04 PROCEDURE — 99212 OFFICE O/P EST SF 10 MIN: CPT | Mod: S$PBB,,, | Performed by: STUDENT IN AN ORGANIZED HEALTH CARE EDUCATION/TRAINING PROGRAM

## 2025-02-04 PROCEDURE — 3074F SYST BP LT 130 MM HG: CPT | Mod: CPTII,,, | Performed by: FAMILY MEDICINE

## 2025-02-04 PROCEDURE — 1159F MED LIST DOCD IN RCRD: CPT | Mod: CPTII,,, | Performed by: STUDENT IN AN ORGANIZED HEALTH CARE EDUCATION/TRAINING PROGRAM

## 2025-02-04 PROCEDURE — 3079F DIAST BP 80-89 MM HG: CPT | Mod: CPTII,,, | Performed by: FAMILY MEDICINE

## 2025-02-04 PROCEDURE — 99213 OFFICE O/P EST LOW 20 MIN: CPT | Mod: PBBFAC,27,PN | Performed by: FAMILY MEDICINE

## 2025-02-04 PROCEDURE — 99999 PR PBB SHADOW E&M-EST. PATIENT-LVL III: CPT | Mod: PBBFAC,,, | Performed by: FAMILY MEDICINE

## 2025-02-04 PROCEDURE — 81515 NFCT DS BV&VAGINITIS DNA ALG: CPT | Performed by: STUDENT IN AN ORGANIZED HEALTH CARE EDUCATION/TRAINING PROGRAM

## 2025-02-04 PROCEDURE — 77067 SCR MAMMO BI INCL CAD: CPT | Mod: TC

## 2025-02-04 PROCEDURE — 1160F RVW MEDS BY RX/DR IN RCRD: CPT | Mod: CPTII,,, | Performed by: STUDENT IN AN ORGANIZED HEALTH CARE EDUCATION/TRAINING PROGRAM

## 2025-02-04 RX ORDER — BUTALBITAL, ACETAMINOPHEN AND CAFFEINE 50; 325; 40 MG/1; MG/1; MG/1
TABLET ORAL
Qty: 30 TABLET | Refills: 2 | Status: SHIPPED | OUTPATIENT
Start: 2025-02-04

## 2025-02-04 RX ORDER — METHOCARBAMOL 500 MG/1
TABLET, FILM COATED ORAL
Qty: 60 TABLET | Refills: 5 | Status: SHIPPED | OUTPATIENT
Start: 2025-02-04

## 2025-02-04 NOTE — PROGRESS NOTES
Subjective     Patient ID: Camila Adan is a 54 y.o. female.    Chief Complaint:  STD CHECK (DEB for Trich) and Abdominal Pain (Left side pain that has returned.)      History of Present Illness  55 yo presents for DEB    Here for DEB, reports the LLQ pain just resumed a few days ago.   Reports she just got over the flu and was coughing, concerned that it might be due to a pulled muscle   Pt has no interval health changes    Had not seen PFPT yet. Has plans to discuss this pain with her primary care provider as she had a normal pelvic ultrasound previously       GYN & OB History  Patient's last menstrual period was 2021.   Date of Last Pap: 2024    OB History    Para Term  AB Living   5 3 3   2     SAB IAB Ectopic Multiple Live Births                  # Outcome Date GA Lbr Janusz/2nd Weight Sex Type Anes PTL Lv   5 AB            4 AB            3 Term            2 Term            1 Term                Review of Systems  Review of Systems   Gastrointestinal:  Positive for abdominal pain.   All other systems reviewed and are negative.         Objective   Physical Exam:   Constitutional: She appears well-developed and well-nourished.    HENT:   Head: Normocephalic and atraumatic.    Eyes: EOM are normal.      Pulmonary/Chest: Effort normal.        Abdominal: Soft.     Genitourinary:    Vagina, uterus, right adnexa and left adnexa normal.      Pelvic exam was performed with patient in the lithotomy position.   The external female genitalia was normal.   Genitalia hair distrobution normal .   There is no lesion on the right labia. There is no lesion on the left labia. Cervix is normal.    Genitourinary Comments: Female chaperone present for duration of exam             Musculoskeletal: Normal range of motion.       Neurological: She is alert.    Skin: Skin is warm and dry.    Psychiatric: She has a normal mood and affect.            Assessment and Plan     1. Trichomonal vulvovaginitis           Plan:  1. Trichomonal vulvovaginitis (Primary)  - Vaginosis Screen by DNA Probe      Follow up for WWE in 1 year or sooner as needed     Gil Baker III, MD  [facility] - OB GYN   [address]  [place] [address] [phone]

## 2025-02-04 NOTE — PROGRESS NOTES
Subjective:       Patient ID: Camila Adan is a 54 y.o. female.    Chief Complaint: Follow-up      HPI:  54-year-old white female in for six-month checkup---son incarcerated --has 2nd appeal end of February---patient's caring for grandchild--9yo--4yo--2 yo---daughter-in-law so lives with patient---her laziness bothers patient --asked her do I have MAID written on her behind  Patient states needs a new Rollator walker states can qualify for whenever 5 years--1 of the breaks do not work 1 of the wheels gets stuck seat split--needs walker because of balance issues does fairly well in the house but when outside needs walker for balance  Patient states muscle relaxer Zanaflex makes the pain worse---   CT scan lung showing multiple pulmonary nodules/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  CT scan of the abdomen showed findings compatible with duodenitis or duodenal ulcer--EGD was done showing nonbleeding gastric ulcer in a nonbleeding duodenal ulcer patient started on Protonix and Carafate---has appt GI for follow up to get another scope findings some inflammation  History of hypertension blood pressure 118/76  patient on losartan  History of osteoporosis on Fosamax  History B12 deficiency on B12  Patient with depression on Celexa  History neuropathy with footdrop--on gabapentin--some ford=gling finger tips wants increase gabapenti --also in Mobic in 10 as needed wants to try a different medication in increase gabapentin  Neuropathy especially in the left lower leg and right lower leg but left greater than right  Saw OBGYN had Pap smear and mammogram set up              ROS --  Skin: no psoriasis, eczema, skin cancer-insect bites  resolved   HEENT: + headache alot has not seen neurologist ,no  ocular pain, blurred vision, diplopia, epistaxis, hoarseness change in voice, thyroid trouble  Lung: No pneumonia, asthma, Tb, wheezing, SOB, smoking stopped recently   Heart: No  chest pain, +ankle edema--were swollen other day--just left ankle , no  palpitations, MI, shane murmur, +hypertension, hyperlipidemia  Abdomen: No nausea,no vomiting   no  diarrhea, constipation, ulcers, hepatitis, gallbladder disease, melena, hematochezia, hematemesis  : no UTI, renal disease, stones  GYN LMP possibly going through menopause lots of hot flashes  MS: no fractures, O/A, lupus, rheumatoid, gout--bilateral leg pain left much greater than right--the bilateral polyneuropathy--contusion right great toe--right foot drop--  Neuro: No dizziness, LOC, seizures   No diabetes, no anemia, no anxiety, + depression--gain 25 lb  Single lives with roommate--3 children--work- unemployed  lives with roommate      Objective:   Physical Exam:    General: Well nourished, well developed, no acute distress +obesity  Skin:  no lesion   HEEN T: Eyes PERRLA, EOM intact, nose patent, throat non-erythematous ears  left TM slightly injected  NECK: Supple, no bruits, No JVD, no nodes  Lungs: Clear, no rales, rhonchi, wheezing  Heart: Regular rate and rhythm, no murmurs, gallops, or rubs  Abdomen: flat, bowel sounds positive, no tenderness, or organomegaly  MS: very poor gait --widen base feet--shuffles feet--very hard for pt get on exam table --despite step up--has right foot drop-- can't dorsiflex foot   Neuro: Alert, CN intact, oriented X 3  Extremities: No cyanosis, clubbing, edema left lower leg --to calf --neg abram's neg calf tenderness but is swollen        Assessment:       1. Polyneuropathy    2. Neuropathy    3. Falls    4. Duodenal ulcer    5. Folic acid deficiency    6. Foot drop, right    7. Tension-type headache, not intractable, unspecified chronicity pattern    8. Tobacco abuse    9. Vitamin D deficiency              Plan:       Polyneuropathy    Neuropathy    Falls    Duodenal ulcer    Folic acid deficiency    Foot drop, right    Tension-type headache, not intractable, unspecified chronicity pattern    Tobacco  abuse    Vitamin D deficiency              Main Reason for Visit-  Recent hospitalization--anemia--GI bleed----gastric ulcer--duodenal ulcer--biopsy negative for H pylori or cancer---on Protonix and Carafate--has appointment with GI mg for follow-up EGD--will get CBCs CMP now and repeat in 6 months---patient has appointment 02/18/2024 to redo gastroscopy--last gastropathy showed the ulcer was partially heel but GI wanted to be sure it is totally healed  Needs new Rollator walker---patient has disequilibrium or balance issues and some numbness in the left lower leg with right foot drop needs walker the 1 she has break does not work wheels gets stuck so it does not roll and C2 split  Headache needs refill of Fioricet  Anxiety/depression--son recently sentence to 8 years in prison---patient had to move in with daughter-in-law to help care for 3 grandchildren 2-month-old 2 years old 8 years old---lots of stress  Weight gain--235---quit drinking and quit smoking but vaping--morbid obesity mild hiatal hernia  Rollator walker--patient's wore later walkers been broken needs a note so they will come to her house and fix her Rollator walker  Still with problems with neuropathy in the legs--will switch meloxicam to diclofenac--Zanaflex to Flexeril--increase gabapentin 800 mg t.i.d.--have patient see neurologist for headaches and for neuropathy   Physical therapy patient has difficulty ambulating getting physical therapy and memory would like to have physical therapy and shower med order placed for physical therapy needs to be at [facility]  Trying to get appointment with Neurology  Depression patient upset because all she does is sit in the house all day --on Cymbalta  History of headaches on Fioricet p.r.n--needs to see neurologist possible MRI the brain--patient was placed on Imitrex with no relief doing well with Fioricet will refill Fioricet  History of menopausal syndrome wants hormone therapy patient should try healthy  woman with soy or estradiol OTC medications  Hypertension patient doing well on losartan 50 mg  History elevated liver function tests sees hepatologist   Needs do lab as in computer--CBC CMP now Routine lab 6 mo CBC CMP Lipid T4 TSH folic acid   Return in 6 months--in meantime follow-up with GI MD --getting neurology consult--patient Medicaid will have to [facility] or [facility]

## 2025-02-05 ENCOUNTER — TELEPHONE (OUTPATIENT)
Dept: PRIMARY CARE CLINIC | Facility: CLINIC | Age: 55
End: 2025-02-05
Payer: MEDICAID

## 2025-02-05 LAB
BACTERIAL VAGINOSIS DNA: NOT DETECTED
CANDIDA GLABRATA/KRUSEI: NOT DETECTED
CANDIDA RRNA VAG QL PROBE: NOT DETECTED
TRICHOMONAS VAGINALIS: NOT DETECTED

## 2025-02-05 NOTE — TELEPHONE ENCOUNTER
----- Message from Lawanda sent at 2/5/2025 10:46 AM CST -----  Contact: Pt  [phone]  .1MEDICALADVICE     Patient is calling for Medical Advice regarding: Pt would like the nurse to call her. To check the status on pcp putting in order for new walker & how long the process will take with putting order in and getting it.    Patient wants a call back or thru [facility]: call back    Comments:    Please advise patient replies from provider may take up to 48 hours.            Please Call and advise    Thank you    Please do NOT rep[ly to sender as this is from the call center and they answer incoming calls only.

## 2025-02-06 ENCOUNTER — TELEPHONE (OUTPATIENT)
Dept: OBSTETRICS AND GYNECOLOGY | Facility: CLINIC | Age: 55
End: 2025-02-06
Payer: MEDICAID

## 2025-02-06 NOTE — TELEPHONE ENCOUNTER
Notified of results..  ----- Message from Gil Baker MD sent at 2/6/2025 10:51 AM CST -----  Please let Camila Adan know her swabs were negative, the trichomonas has been successfully treated

## 2025-02-07 ENCOUNTER — TELEPHONE (OUTPATIENT)
Dept: PRIMARY CARE CLINIC | Facility: CLINIC | Age: 55
End: 2025-02-07
Payer: MEDICAID

## 2025-02-07 NOTE — TELEPHONE ENCOUNTER
----- Message from Orestes Hancock MD sent at 2/6/2025 10:21 PM CST -----  Call tell pt mammogram with tomography --no evidence of maliganacy Redo mammogram in one year

## 2025-02-12 DIAGNOSIS — R19.7 DIARRHEA, UNSPECIFIED TYPE: Primary | ICD-10-CM

## 2025-02-17 RX ORDER — DIPHENOXYLATE HYDROCHLORIDE AND ATROPINE SULFATE 2.5; .025 MG/1; MG/1
TABLET ORAL
Qty: 20 TABLET | Refills: 1 | Status: SHIPPED | OUTPATIENT
Start: 2025-02-17

## 2025-02-17 RX ORDER — ONDANSETRON 4 MG/1
4 TABLET, FILM COATED ORAL EVERY 6 HOURS PRN
Qty: 20 TABLET | Refills: 1 | Status: SHIPPED | OUTPATIENT
Start: 2025-02-17

## 2025-02-26 ENCOUNTER — TELEPHONE (OUTPATIENT)
Dept: PRIMARY CARE CLINIC | Facility: CLINIC | Age: 55
End: 2025-02-26
Payer: MEDICAID

## 2025-02-26 NOTE — TELEPHONE ENCOUNTER
----- Message from Alice sent at 2/26/2025  1:42 PM CST -----  Contact: Pt [phone]  .1MEDICALADVICE Patient is calling for Medical Advice regarding: anxiety out of controlHow long has patient had these symptoms: in goingPharmacy name and phone#: [facility] - [place], [place] - [address] [place] [address]: [phone] Fax: [phone] wants a call back or thru [facility]: Call backComments: Patient would like to know if you can switch her medication to something different/Stronger ,, it is no longer working for her.Please advise patient replies from provider may take up to 48 hours.Please call and advise.Thank You

## 2025-03-03 DIAGNOSIS — F41.9 ANXIETY: Primary | ICD-10-CM

## 2025-03-03 NOTE — TELEPHONE ENCOUNTER
Care Due:                  Date            Visit Type   Department     Provider  --------------------------------------------------------------------------------                                EP -                              PRIMARY [facility]  Last Visit: 02-      CARE ([facility])   PRIMARY CARE   Orestes Hancock                              EP - PRIMARY [facility]  Next Visit: 05-      CARE ([facility])   [facility] CARE   Orestes Hancock                                                            Last  Test          Frequency    Reason                     Performed    Due Date  --------------------------------------------------------------------------------    Mg Level....  12 months..  alendronate..............  Not Found    Overdue    Phosphate...  12 months..  alendronate..............  Not Found    Overdue    [employer] Embedded Care Due Messages. Reference number: [id number].   3/03/2025 10:14:19 AM CST

## 2025-03-05 RX ORDER — LORAZEPAM 1 MG/1
1 TABLET ORAL DAILY PRN
Qty: 30 TABLET | Refills: 5 | Status: SHIPPED | OUTPATIENT
Start: 2025-03-05

## 2025-03-23 DIAGNOSIS — R51.9 NONINTRACTABLE EPISODIC HEADACHE, UNSPECIFIED HEADACHE TYPE: ICD-10-CM

## 2025-03-23 NOTE — TELEPHONE ENCOUNTER
No care due was identified.  Health [facility] Embedded Care Due Messages. Reference number: [id number].   3/23/2025 8:03:14 AM CDT

## 2025-03-25 RX ORDER — BUTALBITAL, ACETAMINOPHEN AND CAFFEINE 50; 325; 40 MG/1; MG/1; MG/1
TABLET ORAL
Qty: 30 TABLET | Refills: 2 | Status: SHIPPED | OUTPATIENT
Start: 2025-03-25

## 2025-03-25 RX ORDER — GABAPENTIN 600 MG/1
600 TABLET ORAL 3 TIMES DAILY
Qty: 90 TABLET | Refills: 2 | Status: SHIPPED | OUTPATIENT
Start: 2025-03-25

## 2025-04-24 ENCOUNTER — TELEPHONE (OUTPATIENT)
Dept: PRIMARY CARE CLINIC | Facility: CLINIC | Age: 55
End: 2025-04-24
Payer: MEDICAID

## 2025-04-24 NOTE — TELEPHONE ENCOUNTER
----- Message from Kalen sent at 4/24/2025  1:39 PM CDT -----  Contact: [phone]  .1MEDICALADVICE Patient is calling for Medical Advice regarding:pt is calling in regards to getting her staples out and needs someone to call her in regards to as soon as possible Patient wants a call back or thru [facility]:call backComments:Please advise patient replies from provider may take up to 48 hours.

## 2025-04-30 ENCOUNTER — OFFICE VISIT (OUTPATIENT)
Dept: PRIMARY CARE CLINIC | Facility: CLINIC | Age: 55
End: 2025-04-30
Payer: MEDICAID

## 2025-04-30 VITALS
HEART RATE: 84 BPM | DIASTOLIC BLOOD PRESSURE: 100 MMHG | BODY MASS INDEX: 36.44 KG/M2 | SYSTOLIC BLOOD PRESSURE: 168 MMHG | OXYGEN SATURATION: 100 % | HEIGHT: 66 IN | WEIGHT: 226.75 LBS | RESPIRATION RATE: 18 BRPM

## 2025-04-30 DIAGNOSIS — E55.9 VITAMIN D DEFICIENCY: ICD-10-CM

## 2025-04-30 DIAGNOSIS — G62.9 NEUROPATHY: ICD-10-CM

## 2025-04-30 DIAGNOSIS — Z72.0 TOBACCO ABUSE: ICD-10-CM

## 2025-04-30 DIAGNOSIS — S01.01XD LACERATION OF SCALP, SUBSEQUENT ENCOUNTER: Primary | ICD-10-CM

## 2025-04-30 DIAGNOSIS — F41.9 ANXIETY: ICD-10-CM

## 2025-04-30 DIAGNOSIS — K26.9 DUODENAL ULCER: ICD-10-CM

## 2025-04-30 DIAGNOSIS — J40 BRONCHITIS: ICD-10-CM

## 2025-04-30 DIAGNOSIS — R29.6 FALLS: ICD-10-CM

## 2025-04-30 PROCEDURE — 99999 PR PBB SHADOW E&M-EST. PATIENT-LVL IV: CPT | Mod: PBBFAC,,, | Performed by: FAMILY MEDICINE

## 2025-04-30 PROCEDURE — 1159F MED LIST DOCD IN RCRD: CPT | Mod: CPTII,,, | Performed by: FAMILY MEDICINE

## 2025-04-30 PROCEDURE — 3080F DIAST BP >= 90 MM HG: CPT | Mod: CPTII,,, | Performed by: FAMILY MEDICINE

## 2025-04-30 PROCEDURE — 99214 OFFICE O/P EST MOD 30 MIN: CPT | Mod: PBBFAC,PN | Performed by: FAMILY MEDICINE

## 2025-04-30 PROCEDURE — 3077F SYST BP >= 140 MM HG: CPT | Mod: CPTII,,, | Performed by: FAMILY MEDICINE

## 2025-04-30 PROCEDURE — 99214 OFFICE O/P EST MOD 30 MIN: CPT | Mod: S$PBB,,, | Performed by: FAMILY MEDICINE

## 2025-04-30 PROCEDURE — 3008F BODY MASS INDEX DOCD: CPT | Mod: CPTII,,, | Performed by: FAMILY MEDICINE

## 2025-04-30 RX ORDER — LORAZEPAM 2 MG/1
TABLET ORAL
Qty: 30 TABLET | Refills: 2 | Status: SHIPPED | OUTPATIENT
Start: 2025-04-30

## 2025-04-30 NOTE — PROGRESS NOTES
Subjective:       Patient ID: Camila Adan is a 54 y.o. female.    Chief Complaint: Suture / Staple Removal      HPI:  54-year-old white female in for six-month checkup---son incarcerated --has 2nd appeal end of February---patient's caring for grandchild--9yo--4yo--2 yo---daughter-in-law so lives with patient---her laziness bothers patient --asked her do I have MAID written on her behind  Patient states needs a new Rollator walker states can qualify for whenever 5 years--1 of the breaks do not work 1 of the wheels gets stuck seat split--needs walker because of balance issues does fairly well in the house but when outside needs walker for balance  Patient states muscle relaxer Zanaflex makes the pain worse---   CT scan lung showing multiple pulmonary nodules/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  CT scan of the abdomen showed findings compatible with duodenitis or duodenal ulcer--EGD was done showing nonbleeding gastric ulcer in a nonbleeding duodenal ulcer patient started on Protonix and Carafate---has appt GI for follow up to get another scope findings some inflammation  History of hypertension blood pressure 118/76  patient on losartan  History of osteoporosis on Fosamax  History B12 deficiency on B12  Patient with depression on Celexa  History neuropathy with footdrop--on gabapentin--some ford=gling finger tips wants increase gabapenti --also in Mobic in 10 as needed wants to try a different medication in increase gabapentin  Neuropathy especially in the left lower leg and right lower leg but left greater than right  Saw OBGYN had Pap smear and mammogram set up              ROS --  Skin: no psoriasis, eczema, skin cancer-insect bites  resolved   HEENT: + headache alot has not seen neurologist ,no  ocular pain, blurred vision, diplopia, epistaxis, hoarseness change in voice, thyroid trouble  Lung: No pneumonia, asthma, Tb, wheezing, SOB, smoking stopped recently    Heart: No chest pain, +ankle edema--were swollen other day--just left ankle , no  palpitations, MI, shane murmur, +hypertension, hyperlipidemia  Abdomen: No nausea,no vomiting   no  diarrhea, constipation, ulcers, hepatitis, gallbladder disease, melena, hematochezia, hematemesis  : no UTI, renal disease, stones  GYN LMP possibly going through menopause lots of hot flashes  MS: no fractures, O/A, lupus, rheumatoid, gout--bilateral leg pain left much greater than right--the bilateral polyneuropathy--contusion right great toe--right foot drop--  Neuro: No dizziness, LOC, seizures   No diabetes, no anemia, no anxiety, + depression--gain 25 lb  Single lives with roommate--3 children--work- unemployed  lives with roommate      Objective:   Physical Exam:    General: Well nourished, well developed, no acute distress +obesity  Skin:  no lesion   HEEN T: Eyes PERRLA, EOM intact, nose patent, throat non-erythematous ears  left TM slightly injected  NECK: Supple, no bruits, No JVD, no nodes  Lungs: Clear, no rales, rhonchi, wheezing  Heart: Regular rate and rhythm, no murmurs, gallops, or rubs  Abdomen: flat, bowel sounds positive, no tenderness, or organomegaly  MS: very poor gait --widen base feet--shuffles feet--very hard for pt get on exam table --despite step up--has right foot drop-- can't dorsiflex foot   Neuro: Alert, CN intact, oriented X 3  Extremities: No cyanosis, clubbing, edema left lower leg --to calf --neg abram's neg calf tenderness but is swollen        Assessment:       1. Laceration of scalp, subsequent encounter    2. Bronchitis    3. Duodenal ulcer    4. Falls    5. Neuropathy    6. Tobacco abuse    7. Vitamin D deficiency                Plan:       Laceration of scalp, subsequent encounter    Bronchitis    Duodenal ulcer    Falls    Neuropathy    Tobacco abuse    Vitamin D deficiency                Main Reason for Visit-  Recent hospitalization--anemia--GI bleed----gastric ulcer--duodenal  ulcer--biopsy negative for H pylori or cancer---on Protonix and Carafate--has appointment with GI mg for follow-up EGD--will get CBCs CMP now and repeat in 6 months---patient has appointment 02/18/2024 to redo gastroscopy--last gastropathy showed the ulcer was partially heel but GI wanted to be sure it is totally healed  Needs new Rollator walker---patient has disequilibrium or balance issues and some numbness in the left lower leg with right foot drop needs walker the 1 she has break does not work wheels gets stuck so it does not roll and C2 split  Headache needs refill of Fioricet  Anxiety/depression--son recently sentence to 8 years in care home---patient had to move in with daughter-in-law to help care for 3 grandchildren 2-month-old 2 years old 8 years old---lots of stress  Weight gain--235---quit drinking and quit smoking but vaping--morbid obesity mild hiatal hernia  Rollator walker--patient's wore later walkers been broken needs a note so they will come to her house and fix her Rollator walker  Still with problems with neuropathy in the legs--will switch meloxicam to diclofenac--Zanaflex to Flexeril--increase gabapentin 800 mg t.i.d.--have patient see neurologist for headaches and for neuropathy   Physical therapy patient has difficulty ambulating getting physical therapy and memory would like to have physical therapy and shower med order placed for physical therapy [facility]  Trying to get appointment with Neurology  Depression patient upset because all she does is sit in the house all day --on Cymbalta  History of headaches on Fioricet p.r.n--needs to see neurologist possible MRI the brain--patient was placed on Imitrex with no relief doing well with Fioricet will refill Fioricet  History of menopausal syndrome wants hormone therapy patient should try healthy woman with soy or estradiol OTC medications  Hypertension patient doing well on losartan 50 mg  History elevated liver function tests sees  hepatologist   Needs do lab as in computer--CBC CMP now Routine lab 6 mo CBC CMP Lipid T4 TSH folic acid   Return in 6 months--in meantime follow-up with GI MD --getting neurology consult--patient Medicaid will have to go [facility] or [facility]                                                               Subjective:       Patient ID: Camila Adan is a 54 y.o. female.    Chief Complaint: Suture / Staple Removal      HPI:  53 yo WF --patient needs staple removed--patient seen emergency room [id number] for blunt head trauma---was walking to the store using Rollator walker--both legs started shaking---patient walk over 1-1/2 blocks--sat on chair for minutes--was with granddaughter--granddaughter said she could pushed patient---hit a hole---did a flip hit her head on cement.  Patient was days but no loss of consciousness. Seen ER --Staple s without anesthesia. Pt went out town so unable come in get removed.       Office visit 02/04/2025 54-year-old white female in for six-month checkup---son incarcerated --has 2nd appeal end of February---patient's caring for grandchild--7yo--2yo--2 yo---daughter-in-law so lives with patient---her laziness bothers patient --asked her do I have MAID written on her behind  Patient states needs a new Rollator walker states can qualify for whenever 5 years--1 of the breaks do not work 1 of the wheels gets stuck seat split--needs walker because of balance issues does fairly well in the house but when outside needs walker for balance  Patient states muscle relaxer Zanaflex makes the pain worse---   CT scan lung showing multiple pulmonary nodules/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  CT scan of the abdomen showed findings compatible with duodenitis or duodenal ulcer--EGD was done showing nonbleeding gastric ulcer in a nonbleeding duodenal ulcer patient started on Protonix and Carafate---has appt GI for follow up to get another scope findings some  inflammation  History of hypertension blood pressure 118/76  patient on losartan  History of osteoporosis on Fosamax  History B12 deficiency on B12  Patient with depression on Celexa  History neuropathy with footdrop--on gabapentin--some ford=gling finger tips wants increase gabapenti --also in Mobic in 10 as needed wants to try a different medication in increase gabapentin  Neuropathy especially in the left lower leg and right lower leg but left greater than right  Saw OBGYN had Pap smear and mammogram set up              ROS --  Skin: no psoriasis, eczema, skin cancer-laceration of the scalp--3 staples in place--some eschar  HEENT: + headache alot has not seen neurologist ,no  ocular pain, blurred vision, diplopia, epistaxis, hoarseness change in voice, thyroid trouble  Lung: No pneumonia, asthma, Tb, wheezing, SOB, smoking stopped recently   Heart: No chest pain, +ankle edema--were swollen other day--just left ankle , no  palpitations, MI, shane murmur, +hypertension, hyperlipidemia  Abdomen: No nausea,no vomiting   no  diarrhea, constipation, ulcers, hepatitis, gallbladder disease, melena, hematochezia, hematemesis  : no UTI, renal disease, stones  GYN LMP possibly going through menopause lots of hot flashes  MS: no fractures, O/A, lupus, rheumatoid, gout--bilateral leg pain left much greater than right--the bilateral polyneuropathy--contusion right great toe--right foot drop--  Neuro: No dizziness, LOC, seizures   No diabetes, no anemia, no anxiety, + depression--gain 25 lb  Single lives with roommate--3 children--work- unemployed  lives with roommate      Objective:   Physical Exam:    General: Well nourished, well developed, no acute distress +obesity  Skin:  Suture line well healed--a lot of scab present--staples were easily removed x3  HEEN T: Eyes PERRLA, EOM intact, nose patent, throat non-erythematous ears  left TM slightly injected  NECK: Supple, no bruits, No JVD, no nodes  Lungs: Clear, no rales,  rhonchi, wheezing  Heart: Regular rate and rhythm, no murmurs, gallops, or rubs  Abdomen: flat, bowel sounds positive, no tenderness, or organomegaly  MS: very poor gait --widen base feet--shuffles feet--very hard for pt get on exam table --despite step up--has right foot drop-- can't dorsiflex foot   Neuro: Alert, CN intact, oriented X 3  Extremities: No cyanosis, clubbing, edema left lower leg --to calf --neg abram's neg calf tenderness but is swollen        Assessment:       1. Laceration of scalp, subsequent encounter    2. Bronchitis    3. Duodenal ulcer    4. Falls    5. Neuropathy    6. Tobacco abuse    7. Vitamin D deficiency                Plan:       Laceration of scalp, subsequent encounter    Bronchitis    Duodenal ulcer    Falls    Neuropathy    Tobacco abuse    Vitamin D deficiency                Main Reason for Visit-  Laceration scalp secondary to fall while on Rollator walker being pushed by granddaughter--seen in the emergency room [id number]--3 staples removed today lesion is well healed  Hx --anemia--GI bleed----gastric ulcer--duodenal ulcer--biopsy negative for H pylori or cancer---on Protonix and Carafate--has appointment with GI mg for follow-up EGD--will get CBCs CMP now and repeat in 6 months---patient has appointment 02/18/2024 to redo gastroscopy--last gastropathy showed the ulcer was partially heel but GI wanted to be sure it is totally healed  Has  Rollator walker---patient has disequilibrium or balance issues and some numbness in the left lower leg with right foot drop needs walker the 1 she has break does not work wheels gets stuck so it does not roll and C2 split  Headache needs refill of Fioricet  Other medical issues  Anxiety/depression--son recently sentence to 8 years in senior care---patient had to move in with daughter-in-law to help care for 3 grandchildren 2-month-old 2 years old 8 years old---lots of stress--increase Ativan from 1 mg to 2 mg  Weight gain--235---quit drinking and quit  smoking but vaping--morbid obesity mild hiatal hernia  Rollator walker--patient's wore later walkers been broken needs a note so they will come to her house and fix her Rollator walker  Still with problems with neuropathy in the legs--will switch meloxicam to diclofenac--Zanaflex to Flexeril--increase gabapentin 800 mg t.i.d.--have patient see neurologist for headaches and for neuropathy   Physical therapy patient has difficulty ambulating getting physical therapy and memory would like to have physical therapy and shower med order placed for physical therapy needs to be at [facility]  Trying to get appointment with Neurology  Depression patient upset because all she does is sit in the house all day --on Cymbalta  History of headaches on Fioricet p.r.n--needs to see neurologist possible MRI the brain--patient was placed on Imitrex with no relief doing well with Fioricet will refill Fioricet  History of menopausal syndrome wants hormone therapy patient should try healthy woman with soy or estradiol OTC medications  Hypertension patient doing well on losartan 50 mg  History elevated liver function tests sees hepatologist   Needs do lab as in computer--CBC CMP now Routine lab 6 mo CBC CMP Lipid T4 TSH folic acid   Return in 6 months--in meantime follow-up with GI MD --getting neurology consult--patient Medicaid will have to go through [facility] or [facility]

## 2025-06-16 DIAGNOSIS — G62.9 NEUROPATHY: Primary | ICD-10-CM

## 2025-06-16 NOTE — TELEPHONE ENCOUNTER
Copied from CRM #[id number]. Topic: Medications - Medication Refill  >> Jun 16, 2025 11:51 AM Fabián wrote:  Requesting an RX refill or new RX.gabapentin (NEURONTIN) 800 MG tablet    Is this a refill or new RX: Refill     RX name and strength (copy/paste from chart):      Is this a 30 day or 90 day RX:     Pharmacy name and phone # [facility] - [place], [place] - [address] [place] [address]  Phone: [phone] Fax: [phone]        Who called and call back number:    The doctors have asked that we provide their patients with the following 2 reminders -- prescription refills can take up to 72 hours, and a friendly reminder that in the future you can use your [facility] account to request refills:

## 2025-06-16 NOTE — TELEPHONE ENCOUNTER
Care Due:                  Date            Visit Type   Department     Provider  --------------------------------------------------------------------------------                                EP -                              PRIMARY [facility]  Last Visit: 04-      CARE ([facility])   PRIMARY CARE   Orestes Hancock                              EP - [facility]IMARY [facility]  Next Visit: 08-      CARE ([facility])   PRIMARY CARE   Orestes Hancock                                                            Last  Test          Frequency    Reason                     Performed    Due Date  --------------------------------------------------------------------------------    Mg Level....  12 months..  alendronate..............  Not Found    Overdue    Phosphate...  12 months..  alendronate..............  Not Found    Overdue    Vitamin D...  12 months..  ergocalciferol...........  08-   08-    [facility] Embedded Care Due Messages. Reference number: [id number].   6/16/2025 2:39:10 PM CDT

## 2025-06-17 RX ORDER — GABAPENTIN 800 MG/1
800 TABLET ORAL 3 TIMES DAILY
Qty: 90 TABLET | Refills: 5 | Status: SHIPPED | OUTPATIENT
Start: 2025-06-17

## 2025-06-24 ENCOUNTER — TELEPHONE (OUTPATIENT)
Dept: PRIMARY CARE CLINIC | Facility: CLINIC | Age: 55
End: 2025-06-24
Payer: MEDICAID

## 2025-06-24 NOTE — TELEPHONE ENCOUNTER
Copied from CRM #[id number]. Topic: General Inquiry - Return Call  >> Jun 23, 2025  4:42 PM Fabián wrote:  Patient is returning a phone call. Yes     Who left a message for the patient: Pt says she missed a call from someone in the office     Does patient know what this is regarding:      Would you like a call back, or a response through your [facility] portal?:   Call back     Best call back number: [phone]    Comments: Please call pt to advise

## 2025-06-25 DIAGNOSIS — G62.9 NEUROPATHY: ICD-10-CM

## 2025-06-25 DIAGNOSIS — R23.2 HOT FLASHES: ICD-10-CM

## 2025-06-25 NOTE — TELEPHONE ENCOUNTER
Care Due:                  Date            Visit Type   Department     Provider  --------------------------------------------------------------------------------                                EP -                              PRIMARY [facility]  Last Visit: 04-      CARE ([facility])   PRIMARY CARE   Orestes Hancock                               -                              [facility]IMARY [facility]  Next Visit: 08-      CARE ([facility])   PRIMARY CARE   Orestes Hancock                                                            Last  Test          Frequency    Reason                     Performed    Due Date  --------------------------------------------------------------------------------    CBC.........  12 months..  diclofenac...............  09- 09-    CMP.........  12 months..  alendronate, diclofenac,   09- 09-                             ergocalciferol...........    [employer] Embedded Care Due Messages. Reference number: [id number].   6/25/2025 9:37:29 AM CDT

## 2025-06-25 NOTE — TELEPHONE ENCOUNTER
Lf-2/4/25  Lov-4/30/25  
No care due was identified.  [facility] Embedded Care Due Messages. Reference number: [id number].   6/25/2025 9:57:37 AM CDT  
Refill Routing Note   Medication(s) are not appropriate for processing by [facility] for the following reason(s):        Outside of protocol    [facility] action(s):  Route               Appointments  past 12m or future 3m with PCP    Date Provider   Last Visit   4/30/2025 Orestes Hancock MD   Next Visit   8/11/2025 Orestes Hancock MD   ED visits in past 90 days: 1        Note composed:10:10 AM 06/25/2025          
Prophylactic measure

## 2025-06-25 NOTE — TELEPHONE ENCOUNTER
Refill Routing Note   Medication(s) are not appropriate for processing by [facility] for the following reason(s):        Required vitals abnormal    [facility] action(s):  Defer     Requires labs : Yes      Medication Therapy Plan: FOVS      Appointments  past 12m or future 3m with PCP    Date Provider   Last Visit   4/30/2025 Orestes Hancock MD   Next Visit   6/25/2025 Orestes Hancock MD   ED visits in past 90 days: 1        Note composed:10:15 AM 06/25/2025

## 2025-06-26 RX ORDER — METHOCARBAMOL 500 MG/1
TABLET, FILM COATED ORAL
Qty: 60 TABLET | Refills: 5 | Status: SHIPPED | OUTPATIENT
Start: 2025-06-26

## 2025-06-26 RX ORDER — DULOXETIN HYDROCHLORIDE 60 MG/1
60 CAPSULE, DELAYED RELEASE ORAL
Qty: 90 CAPSULE | Refills: 3 | Status: SHIPPED | OUTPATIENT
Start: 2025-06-26

## 2025-07-20 NOTE — TELEPHONE ENCOUNTER
No care due was identified.  [facility] Embedded Care Due Messages. Reference number: [id number].   7/20/2025 8:04:02 AM CDT

## 2025-07-21 RX ORDER — DICLOFENAC SODIUM 75 MG/1
TABLET, DELAYED RELEASE ORAL
Qty: 60 TABLET | Refills: 5 | Status: SHIPPED | OUTPATIENT
Start: 2025-07-21

## 2025-07-21 NOTE — TELEPHONE ENCOUNTER
Refill Routing Note   Medication(s) are not appropriate for processing by [facility] for the following reason(s):        Outside of protocol    [facility] action(s):  Route               Appointments  past 12m or future 3m with PCP    Date Provider   Last Visit   4/30/2025 Orestes Hancock MD   Next Visit   8/11/2025 Orestes Hancock MD   ED visits in past 90 days: 0        Note composed:8:58 AM 07/21/2025

## 2025-08-03 DIAGNOSIS — F41.9 ANXIETY: ICD-10-CM

## 2025-08-03 NOTE — TELEPHONE ENCOUNTER
No care due was identified.  [facility] Embedded Care Due Messages. Reference number: [id number].   8/03/2025 8:04:16 AM CDT

## 2025-08-04 RX ORDER — LORAZEPAM 2 MG/1
2 TABLET ORAL DAILY PRN
Qty: 30 TABLET | Refills: 2 | Status: SHIPPED | OUTPATIENT
Start: 2025-08-04

## 2025-08-08 ENCOUNTER — TELEPHONE (OUTPATIENT)
Dept: PRIMARY CARE CLINIC | Facility: CLINIC | Age: 55
End: 2025-08-08
Payer: MEDICAID

## 2025-08-08 NOTE — TELEPHONE ENCOUNTER
Patient notified that she will need an appointment since she has not been seen since April. She says she has an appt on Monday but she doesn't know if she will be able to make it because she is moving. She will let us know    Pyelonephritis.  Pyelo

## 2025-08-08 NOTE — TELEPHONE ENCOUNTER
Copied from CRM #[id number]. Topic: Medications - Medication Refill  >> Aug 8, 2025 11:29 AM Kelly wrote:  Type:  RX Refill Request    Who Called: Camila Adan    Refill or New Rx:Refill 1  RX Name and Strength:gabapentin (NEURONTIN) 800 MG tablet  How is the patient currently taking it? (ex. 1XDay):  Is this a 30 day or 90 day RX:  Pref[place] Pharm[place] with phone number[place] [place] [address] [place] [address]  Phone: [phone] Fax: [phone]  Hours: Not open 24 hours  Local or Mail Order:Local   Ordering Provider:Dr Santi Carlisle  Would the patient rather a call back or a response via [facility]? phone  Best Call Back Number:[phone]  Additional Information:       Refill or New Rx:Refill 2  RX Name and Strength:anxiety medication   How is the patient currently taking it? (ex. 1XDay):  Is this a 30 day or 90 day RX:  Preferred Pharmacy with phone number:[facility] - [place], [place] - [address] [place] [address]  Phone: [phone] Fax: [phone]  Hours: Not open 24 hours  Local or Mail Order:Local   Ordering Provider:Dr Santi Carlisle  Would the patient rather a call back or a response via [facility]? phone  Best Call Back Number:[phone]  Additional Information: patient does not recall the name of the medication but stated that is for her anxiety.          Refill or New Rx:Refill 3  RX Name and Strength: Headache medication  How is the patient currently taking it? (ex. 1XDay):  Is this a 30 day or 90 day RX:  Preferred Pharmacy with phone number:[facility] - [place], [place] - [address] [place] [address]  Phone: [phone] Fax: [phone]  Hours: Not open 24 hours  Local or Mail Order:Local   Ordering Provider:Dr Santi Carlisle  Would the patient rather a call back or a response via [facility]? phone  Best Call Back  Number:[phone]  Additional Information: patient does not recall the name of the medication but stated that is for her headache